# Patient Record
Sex: FEMALE | Race: WHITE | Employment: OTHER | URBAN - METROPOLITAN AREA
[De-identification: names, ages, dates, MRNs, and addresses within clinical notes are randomized per-mention and may not be internally consistent; named-entity substitution may affect disease eponyms.]

---

## 2017-01-13 ENCOUNTER — TELEPHONE (OUTPATIENT)
Dept: FAMILY MEDICINE CLINIC | Age: 82
End: 2017-01-13

## 2017-01-18 ENCOUNTER — TELEPHONE (OUTPATIENT)
Dept: FAMILY MEDICINE CLINIC | Age: 82
End: 2017-01-18

## 2017-01-23 ENCOUNTER — TELEPHONE (OUTPATIENT)
Dept: FAMILY MEDICINE CLINIC | Age: 82
End: 2017-01-23

## 2017-03-02 ENCOUNTER — OFFICE VISIT (OUTPATIENT)
Dept: FAMILY MEDICINE CLINIC | Age: 82
End: 2017-03-02

## 2017-03-02 VITALS
BODY MASS INDEX: 27.05 KG/M2 | TEMPERATURE: 98.1 F | OXYGEN SATURATION: 97 % | WEIGHT: 147 LBS | DIASTOLIC BLOOD PRESSURE: 62 MMHG | RESPIRATION RATE: 16 BRPM | HEART RATE: 72 BPM | SYSTOLIC BLOOD PRESSURE: 118 MMHG | HEIGHT: 62 IN

## 2017-03-02 DIAGNOSIS — F51.01 PRIMARY INSOMNIA: Primary | ICD-10-CM

## 2017-03-02 PROCEDURE — G8510 SCR DEP NEG, NO PLAN REQD: HCPCS | Performed by: FAMILY MEDICINE

## 2017-03-02 PROCEDURE — 3288F FALL RISK ASSESSMENT DOCD: CPT | Performed by: FAMILY MEDICINE

## 2017-03-02 PROCEDURE — 99213 OFFICE O/P EST LOW 20 MIN: CPT | Performed by: FAMILY MEDICINE

## 2017-03-02 RX ORDER — ESZOPICLONE 2 MG/1
2 TABLET, FILM COATED ORAL NIGHTLY PRN
Qty: 30 TABLET | Refills: 1 | Status: SHIPPED | OUTPATIENT
Start: 2017-03-02 | End: 2017-03-07 | Stop reason: CLARIF

## 2017-03-02 ASSESSMENT — PATIENT HEALTH QUESTIONNAIRE - PHQ9
SUM OF ALL RESPONSES TO PHQ QUESTIONS 1-9: 0
1. LITTLE INTEREST OR PLEASURE IN DOING THINGS: 0
2. FEELING DOWN, DEPRESSED OR HOPELESS: 0
SUM OF ALL RESPONSES TO PHQ9 QUESTIONS 1 & 2: 0

## 2017-03-03 ENCOUNTER — TELEPHONE (OUTPATIENT)
Dept: FAMILY MEDICINE CLINIC | Age: 82
End: 2017-03-03

## 2017-03-07 ENCOUNTER — TELEPHONE (OUTPATIENT)
Dept: FAMILY MEDICINE CLINIC | Age: 82
End: 2017-03-07

## 2017-03-07 RX ORDER — RAMELTEON 8 MG/1
8 TABLET ORAL NIGHTLY PRN
Qty: 30 TABLET | Refills: 0 | Status: SHIPPED | OUTPATIENT
Start: 2017-03-07 | End: 2017-04-06

## 2017-03-27 ENCOUNTER — TELEPHONE (OUTPATIENT)
Dept: FAMILY MEDICINE CLINIC | Age: 82
End: 2017-03-27

## 2017-03-31 ENCOUNTER — TELEPHONE (OUTPATIENT)
Dept: FAMILY MEDICINE CLINIC | Age: 82
End: 2017-03-31

## 2017-06-26 ENCOUNTER — OFFICE VISIT (OUTPATIENT)
Dept: FAMILY MEDICINE CLINIC | Age: 82
End: 2017-06-26

## 2017-06-26 VITALS
HEIGHT: 61 IN | HEART RATE: 66 BPM | TEMPERATURE: 97.8 F | WEIGHT: 146 LBS | BODY MASS INDEX: 27.56 KG/M2 | DIASTOLIC BLOOD PRESSURE: 78 MMHG | OXYGEN SATURATION: 97 % | SYSTOLIC BLOOD PRESSURE: 124 MMHG | RESPIRATION RATE: 16 BRPM

## 2017-06-26 DIAGNOSIS — M19.90 ARTHRITIS: ICD-10-CM

## 2017-06-26 DIAGNOSIS — I10 ESSENTIAL HYPERTENSION: Primary | ICD-10-CM

## 2017-06-26 DIAGNOSIS — E78.5 HYPERLIPIDEMIA, UNSPECIFIED HYPERLIPIDEMIA TYPE: ICD-10-CM

## 2017-06-26 LAB
ALBUMIN SERPL-MCNC: 4.7 G/DL (ref 3.4–5)
ALP BLD-CCNC: 48 U/L (ref 40–129)
ALT SERPL-CCNC: 14 U/L (ref 10–40)
ANION GAP SERPL CALCULATED.3IONS-SCNC: 17 MMOL/L (ref 3–16)
AST SERPL-CCNC: 18 U/L (ref 15–37)
BILIRUB SERPL-MCNC: <0.2 MG/DL (ref 0–1)
BILIRUBIN DIRECT: <0.2 MG/DL (ref 0–0.3)
BILIRUBIN, INDIRECT: NORMAL MG/DL (ref 0–1)
BUN BLDV-MCNC: 31 MG/DL (ref 7–20)
CALCIUM SERPL-MCNC: 9.9 MG/DL (ref 8.3–10.6)
CHLORIDE BLD-SCNC: 103 MMOL/L (ref 99–110)
CHOLESTEROL, TOTAL: 162 MG/DL (ref 0–199)
CO2: 24 MMOL/L (ref 21–32)
CREAT SERPL-MCNC: 1.2 MG/DL (ref 0.6–1.2)
GFR AFRICAN AMERICAN: 52
GFR NON-AFRICAN AMERICAN: 43
GLUCOSE BLD-MCNC: 106 MG/DL (ref 70–99)
HDLC SERPL-MCNC: 69 MG/DL (ref 40–60)
LDL CHOLESTEROL CALCULATED: 70 MG/DL
POTASSIUM SERPL-SCNC: 5.3 MMOL/L (ref 3.5–5.1)
SODIUM BLD-SCNC: 144 MMOL/L (ref 136–145)
TOTAL PROTEIN: 7.4 G/DL (ref 6.4–8.2)
TRIGL SERPL-MCNC: 114 MG/DL (ref 0–150)
VLDLC SERPL CALC-MCNC: 23 MG/DL

## 2017-06-26 PROCEDURE — 99214 OFFICE O/P EST MOD 30 MIN: CPT | Performed by: FAMILY MEDICINE

## 2017-06-26 PROCEDURE — 36415 COLL VENOUS BLD VENIPUNCTURE: CPT | Performed by: FAMILY MEDICINE

## 2017-06-26 RX ORDER — ATORVASTATIN CALCIUM 10 MG/1
10 TABLET, FILM COATED ORAL DAILY
Qty: 30 TABLET | Refills: 5 | Status: SHIPPED | OUTPATIENT
Start: 2017-06-26 | End: 2017-11-09 | Stop reason: SDUPTHER

## 2017-06-26 RX ORDER — LISINOPRIL 20 MG/1
20 TABLET ORAL DAILY
Qty: 30 TABLET | Refills: 5 | Status: SHIPPED | OUTPATIENT
Start: 2017-06-26 | End: 2017-11-09 | Stop reason: SDUPTHER

## 2017-06-26 RX ORDER — HYDROCODONE BITARTRATE AND ACETAMINOPHEN 5; 325 MG/1; MG/1
1 TABLET ORAL DAILY PRN
Qty: 30 TABLET | Refills: 0 | Status: CANCELLED | OUTPATIENT
Start: 2017-06-26

## 2017-06-26 RX ORDER — MELOXICAM 15 MG/1
15 TABLET ORAL DAILY
Qty: 30 TABLET | Refills: 5 | Status: SHIPPED | OUTPATIENT
Start: 2017-06-26 | End: 2017-11-09 | Stop reason: SDUPTHER

## 2017-10-09 ENCOUNTER — OFFICE VISIT (OUTPATIENT)
Dept: ORTHOPEDIC SURGERY | Age: 82
End: 2017-10-09

## 2017-10-09 VITALS
BODY MASS INDEX: 27.55 KG/M2 | HEART RATE: 68 BPM | SYSTOLIC BLOOD PRESSURE: 131 MMHG | HEIGHT: 61 IN | WEIGHT: 145.94 LBS | DIASTOLIC BLOOD PRESSURE: 79 MMHG

## 2017-10-09 DIAGNOSIS — M46.1 SACROILIITIS (HCC): ICD-10-CM

## 2017-10-09 DIAGNOSIS — M19.90 ARTHRITIS: Primary | ICD-10-CM

## 2017-10-09 PROCEDURE — 99213 OFFICE O/P EST LOW 20 MIN: CPT | Performed by: ORTHOPAEDIC SURGERY

## 2017-10-09 PROCEDURE — 73502 X-RAY EXAM HIP UNI 2-3 VIEWS: CPT | Performed by: ORTHOPAEDIC SURGERY

## 2017-10-09 RX ORDER — PREDNISONE 1 MG/1
TABLET ORAL
Qty: 55 TABLET | Refills: 1 | Status: SHIPPED | OUTPATIENT
Start: 2017-10-09 | End: 2018-01-05

## 2017-10-09 NOTE — PROGRESS NOTES
meloxicam if she has no relief then she'll discontinue its use and return the meloxicam she gets 50% or more relief she will obtain a 2nd taper and after 1 weeks time proceed with the 2nd taper.   She'll follow-up with us in [id number]

## 2017-10-10 ENCOUNTER — OFFICE VISIT (OUTPATIENT)
Dept: FAMILY MEDICINE CLINIC | Age: 82
End: 2017-10-10

## 2017-10-10 VITALS
HEART RATE: 80 BPM | HEIGHT: 60 IN | DIASTOLIC BLOOD PRESSURE: 58 MMHG | BODY MASS INDEX: 28.47 KG/M2 | WEIGHT: 145 LBS | SYSTOLIC BLOOD PRESSURE: 94 MMHG

## 2017-10-10 DIAGNOSIS — M15.9 PRIMARY OSTEOARTHRITIS INVOLVING MULTIPLE JOINTS: Primary | ICD-10-CM

## 2017-10-10 PROCEDURE — 99213 OFFICE O/P EST LOW 20 MIN: CPT | Performed by: FAMILY MEDICINE

## 2017-10-10 RX ORDER — HYDROCODONE BITARTRATE AND ACETAMINOPHEN 5; 325 MG/1; MG/1
TABLET ORAL
Qty: 30 TABLET | Refills: 0 | Status: SHIPPED | OUTPATIENT
Start: 2017-10-10 | End: 2018-05-01 | Stop reason: SDUPTHER

## 2017-10-10 NOTE — PROGRESS NOTES
SUBJECTIVE:  F/U Osteoarthritis. She continues to take meloxicam, but still has enough arthritis pain that she has difficulty sleeping at night, desires refill of Norco - she takes one at night when needed to help her sleep so she does not hurt at night and feel bad the next day from lack of sleep. She states she needs to take one about once a week. She denies any medication side effects. Current tobacco use: None. OBJECTIVE:  BP (!) 94/58 (Site: Right Arm, Position: Sitting, Cuff Size: Medium Adult)   Pulse 80   Ht 5' (1.524 m)   Wt 145 lb (65.8 kg)   BMI 28.32 kg/m²     Normal mood and affect. ASSESSMENT:  1. Primary osteoarthritis involving multiple joints        PLAN:  Continue meloxicam 15 mg qd. Controlled Substances Monitoring:     Attestation: The Prescription Monitoring Report for this patient was reviewed today. Juana Estrada MD)  Documentation: Possible medication side effects, risk of tolerance and/or dependence, and alternative treatments discussed., No signs of potential drug abuse or diversion identified., Existing medication contract. Juana Estrada MD)     Orders Placed This Encounter   Medications    HYDROcodone-acetaminophen (NORCO) 5-325 MG per tablet     Sig: Take one tablet at bedtime as needed for pain. Marlene Keenan Dispense:  30 tablet     Refill:  0       Arthritis management discussed. 15 minutes were spent with patient . Greater than 50% continuity of care or counseling.

## 2017-11-09 ENCOUNTER — OFFICE VISIT (OUTPATIENT)
Dept: FAMILY MEDICINE CLINIC | Age: 82
End: 2017-11-09

## 2017-11-09 VITALS
HEART RATE: 60 BPM | DIASTOLIC BLOOD PRESSURE: 60 MMHG | BODY MASS INDEX: 28.47 KG/M2 | OXYGEN SATURATION: 96 % | RESPIRATION RATE: 16 BRPM | HEIGHT: 60 IN | WEIGHT: 145 LBS | SYSTOLIC BLOOD PRESSURE: 102 MMHG

## 2017-11-09 DIAGNOSIS — M19.90 ARTHRITIS: ICD-10-CM

## 2017-11-09 DIAGNOSIS — E78.5 HYPERLIPIDEMIA, UNSPECIFIED HYPERLIPIDEMIA TYPE: ICD-10-CM

## 2017-11-09 DIAGNOSIS — I10 ESSENTIAL HYPERTENSION: Primary | ICD-10-CM

## 2017-11-09 PROCEDURE — 99214 OFFICE O/P EST MOD 30 MIN: CPT | Performed by: FAMILY MEDICINE

## 2017-11-09 RX ORDER — LISINOPRIL 20 MG/1
20 TABLET ORAL DAILY
Qty: 30 TABLET | Refills: 5 | Status: SHIPPED | OUTPATIENT
Start: 2017-11-09 | End: 2017-12-19 | Stop reason: SDUPTHER

## 2017-11-09 RX ORDER — ATORVASTATIN CALCIUM 10 MG/1
10 TABLET, FILM COATED ORAL DAILY
Qty: 30 TABLET | Refills: 5 | Status: SHIPPED | OUTPATIENT
Start: 2017-11-09 | End: 2018-07-19 | Stop reason: SDUPTHER

## 2017-11-09 RX ORDER — MELOXICAM 15 MG/1
15 TABLET ORAL DAILY
Qty: 30 TABLET | Refills: 5 | Status: SHIPPED | OUTPATIENT
Start: 2017-11-09 | End: 2018-05-01

## 2017-11-09 NOTE — PROGRESS NOTES
SUBJECTIVE:  F/U HTN, Lipids, OA  She needs medication refills. She is not fasting for blood tests. BP controlled. No chest pain or dyspnea. No medication side effects, such as cough or dizziness. Some myalgias from Lipitor. Meloxicam helps with arthritis pain, with no nausea or abdominal pain, desires refill. Arthritis sx's unchanged. Current tobacco use: None. OBJECTIVE:  /60 (Site: Left Arm, Position: Sitting, Cuff Size: Medium Adult)   Pulse 60   Resp 16   Ht 5' (1.524 m)   Wt 145 lb (65.8 kg)   SpO2 96%   BMI 28.32 kg/m²     Heart: Regular rhythm without murmur. Lungs: Clear to auscultation. Extremities: No edema. ASSESSMENT:  1. Essential hypertension    2. Arthritis    3. Hyperlipidemia, unspecified hyperlipidemia type        PLAN:    Orders Placed This Encounter   Medications    lisinopril (PRINIVIL;ZESTRIL) 20 MG tablet     Sig: Take 1 tablet by mouth daily     Dispense:  30 tablet     Refill:  5    atorvastatin (LIPITOR) 10 MG tablet     Sig: Take 1 tablet by mouth daily     Dispense:  30 tablet     Refill:  5    meloxicam (MOBIC) 15 MG tablet     Sig: Take 1 tablet by mouth daily     Dispense:  30 tablet     Refill:  5       Follow-up appointment in 6 months.

## 2017-12-18 ENCOUNTER — TELEPHONE (OUTPATIENT)
Dept: FAMILY MEDICINE CLINIC | Age: 82
End: 2017-12-18

## 2017-12-18 NOTE — TELEPHONE ENCOUNTER
Former patient of Dr Shabana Ford; patient scheduled for new patient appointment 1-05-18 but is out of Lisinopril and would like to know if she can get a refill     [facility]

## 2017-12-19 RX ORDER — LISINOPRIL 20 MG/1
20 TABLET ORAL DAILY
Qty: 30 TABLET | Refills: 0 | Status: SHIPPED | OUTPATIENT
Start: 2017-12-19 | End: 2018-08-16 | Stop reason: SDUPTHER

## 2018-01-05 ENCOUNTER — OFFICE VISIT (OUTPATIENT)
Dept: FAMILY MEDICINE CLINIC | Age: 83
End: 2018-01-05

## 2018-01-05 VITALS
HEART RATE: 88 BPM | SYSTOLIC BLOOD PRESSURE: 124 MMHG | HEIGHT: 60 IN | OXYGEN SATURATION: 97 % | DIASTOLIC BLOOD PRESSURE: 76 MMHG | BODY MASS INDEX: 29.06 KG/M2 | WEIGHT: 148 LBS | RESPIRATION RATE: 16 BRPM

## 2018-01-05 DIAGNOSIS — E78.5 HYPERLIPIDEMIA, UNSPECIFIED HYPERLIPIDEMIA TYPE: ICD-10-CM

## 2018-01-05 DIAGNOSIS — Z23 NEED FOR PNEUMOCOCCAL VACCINATION: ICD-10-CM

## 2018-01-05 DIAGNOSIS — M19.90 OSTEOARTHRITIS, UNSPECIFIED OSTEOARTHRITIS TYPE, UNSPECIFIED SITE: ICD-10-CM

## 2018-01-05 DIAGNOSIS — M85.80 OSTEOPENIA, UNSPECIFIED LOCATION: ICD-10-CM

## 2018-01-05 DIAGNOSIS — I10 ESSENTIAL HYPERTENSION: ICD-10-CM

## 2018-01-05 DIAGNOSIS — M46.1 SACROILIITIS (HCC): ICD-10-CM

## 2018-01-05 LAB
ANION GAP SERPL CALCULATED.3IONS-SCNC: 14 MMOL/L (ref 3–16)
BASOPHILS ABSOLUTE: 0 K/UL (ref 0–0.2)
BASOPHILS RELATIVE PERCENT: 0.4 %
BUN BLDV-MCNC: 31 MG/DL (ref 7–20)
CALCIUM SERPL-MCNC: 9.8 MG/DL (ref 8.3–10.6)
CHLORIDE BLD-SCNC: 103 MMOL/L (ref 99–110)
CO2: 27 MMOL/L (ref 21–32)
CREAT SERPL-MCNC: 1.3 MG/DL (ref 0.6–1.2)
EOSINOPHILS ABSOLUTE: 0.1 K/UL (ref 0–0.6)
EOSINOPHILS RELATIVE PERCENT: 2.2 %
GFR AFRICAN AMERICAN: 48
GFR NON-AFRICAN AMERICAN: 39
GLUCOSE BLD-MCNC: 138 MG/DL (ref 70–99)
HCT VFR BLD CALC: 36.5 % (ref 36–48)
HEMOGLOBIN: 12 G/DL (ref 12–16)
LYMPHOCYTES ABSOLUTE: 1.6 K/UL (ref 1–5.1)
LYMPHOCYTES RELATIVE PERCENT: 30.4 %
MCH RBC QN AUTO: 30.5 PG (ref 26–34)
MCHC RBC AUTO-ENTMCNC: 32.9 G/DL (ref 31–36)
MCV RBC AUTO: 92.7 FL (ref 80–100)
MONOCYTES ABSOLUTE: 0.4 K/UL (ref 0–1.3)
MONOCYTES RELATIVE PERCENT: 6.7 %
NEUTROPHILS ABSOLUTE: 3.2 K/UL (ref 1.7–7.7)
NEUTROPHILS RELATIVE PERCENT: 60.3 %
PDW BLD-RTO: 14.9 % (ref 12.4–15.4)
PLATELET # BLD: 171 K/UL (ref 135–450)
PMV BLD AUTO: 9.6 FL (ref 5–10.5)
POTASSIUM SERPL-SCNC: 4.8 MMOL/L (ref 3.5–5.1)
RBC # BLD: 3.94 M/UL (ref 4–5.2)
SODIUM BLD-SCNC: 144 MMOL/L (ref 136–145)
TSH SERPL DL<=0.05 MIU/L-ACNC: 2.58 UIU/ML (ref 0.27–4.2)
WBC # BLD: 5.3 K/UL (ref 4–11)

## 2018-01-05 PROCEDURE — 99214 OFFICE O/P EST MOD 30 MIN: CPT | Performed by: NURSE PRACTITIONER

## 2018-01-05 PROCEDURE — 90732 PPSV23 VACC 2 YRS+ SUBQ/IM: CPT | Performed by: NURSE PRACTITIONER

## 2018-01-05 PROCEDURE — G0009 ADMIN PNEUMOCOCCAL VACCINE: HCPCS | Performed by: NURSE PRACTITIONER

## 2018-01-05 ASSESSMENT — PATIENT HEALTH QUESTIONNAIRE - PHQ9
SUM OF ALL RESPONSES TO PHQ QUESTIONS 1-9: 2
1. LITTLE INTEREST OR PLEASURE IN DOING THINGS: 1
2. FEELING DOWN, DEPRESSED OR HOPELESS: 1
SUM OF ALL RESPONSES TO PHQ9 QUESTIONS 1 & 2: 2

## 2018-01-05 ASSESSMENT — ENCOUNTER SYMPTOMS
BACK PAIN: 1
BLOOD IN STOOL: 0
BLURRED VISION: 0
ABDOMINAL PAIN: 0
COUGH: 0
ORTHOPNEA: 0
SHORTNESS OF BREATH: 0

## 2018-01-05 NOTE — PROGRESS NOTES
Janett Marking [de-identified] y.o. female    Chief Complaint   Patient presents with    Hypertension    Hyperlipidemia    New Patient       HPI     Used to see . +HTN, takes Lisinopril daily, tolerates well, no side effects,   +HLD, on Lipitor 10mg, tolerates well, last  about 6 months ago   Also takes asa 81mg, Sharmila oil, cinnamon. On Ca- Vit D for osteopenia. 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 Takes meloxicam, at times prednisone, very seldom- Norco. Sees ortho. Past medical, surgical, family and social history were reviewed and updated with the patient. Current Outpatient Prescriptions:     lisinopril (PRINIVIL;ZESTRIL) 20 MG tablet, Take 1 tablet by mouth daily, Disp: 30 tablet, Rfl: 0    atorvastatin (LIPITOR) 10 MG tablet, Take 1 tablet by mouth daily, Disp: 30 tablet, Rfl: 5    meloxicam (MOBIC) 15 MG tablet, Take 1 tablet by mouth daily, Disp: 30 tablet, Rfl: 5    HYDROcodone-acetaminophen (NORCO) 5-325 MG per tablet, Take one tablet at bedtime as needed for pain. ., Disp: 30 tablet, Rfl: 0    Calcium-Magnesium-Vitamin D (CALCIUM 500 PO), Take  by mouth., Disp: , Rfl:     Cinnamon 500 MG TABS, Take  by mouth., Disp: , Rfl:     FISH OIL, 1 capsule by Does not apply route., Disp: , Rfl:     aspirin 81 MG tablet, Take 81 mg by mouth daily. , Disp: , Rfl:     Review of Systems   Constitutional: Negative for malaise/fatigue. Eyes: Negative for blurred vision. Respiratory: Negative for cough and shortness of breath. Cardiovascular: Negative for chest pain, palpitations, orthopnea, claudication and leg swelling. Gastrointestinal: Negative for abdominal pain and blood in stool. Musculoskeletal: Positive for back pain. Neurological: Negative for dizziness, tingling, focal weakness, weakness and headaches. Psychiatric/Behavioral: Negative for depression.  The patient is not nervous/anxious. Physical Exam   Constitutional: She is oriented to person, place, and time. She appears well-developed and well-nourished. No distress. Eyes: Conjunctivae are normal.   Neck: Neck supple. No JVD present. Carotid bruit is not present. No thyromegaly present. Cardiovascular: Normal rate, regular rhythm and intact distal pulses. Murmur (KAREEN 1/6) heard. No edema   Pulmonary/Chest: Effort normal and breath sounds normal.   Abdominal: Soft. Bowel sounds are normal.   Musculoskeletal: She exhibits no tenderness. Lymphadenopathy:     She has no cervical adenopathy. Neurological: She is alert and oriented to person, place, and time. Skin: No erythema. Psychiatric: She has a normal mood and affect. Her behavior is normal.   Nursing note and vitals reviewed. Vitals:    01/05/18 0958   BP: 124/76   Pulse: 88   Resp: 16   SpO2: 97%     Assessment    1. Hyperlipidemia, unspecified hyperlipidemia type    2. Essential hypertension    3. Osteoarthritis, unspecified osteoarthritis type, unspecified site    4. Sacroiliitis ([address]    5. Osteopenia, unspecified location    6. Need for pneumococcal vaccination        Plan    Brad Medina was seen today for hypertension, hyperlipidemia and new patient.     Diagnoses and all orders for this visit:    Hyperlipidemia, unspecified hyperlipidemia type        -     Controlled, continue current     Essential hypertension  -     Basic Metabolic Panel  -     CBC WITH AUTO DIFFERENTIAL  -     TSH without Reflex    Osteoarthritis, unspecified osteoarthritis type, unspecified site  Sacroiliitis (HCC)        -     Continue current medications, unless kidney labs continue to worsen from NSAIDs, will recheck BMP    Osteopenia, unspecified location        -     Continue Calcium and Vitamin D, weight bearing exercises    Need for pneumococcal vaccination  -     Pneumococcal polysaccharide vaccine 23-valent >= 1yo subcutaneous/IM (PNEUMOVAX

## 2018-01-08 DIAGNOSIS — R73.09 ELEVATED GLUCOSE: Primary | ICD-10-CM

## 2018-01-08 DIAGNOSIS — R73.09 ELEVATED GLUCOSE: ICD-10-CM

## 2018-01-09 DIAGNOSIS — N28.9 RENAL INSUFFICIENCY: Primary | ICD-10-CM

## 2018-01-09 LAB
ESTIMATED AVERAGE GLUCOSE: 125.5 MG/DL
HBA1C MFR BLD: 6 %

## 2018-03-05 DIAGNOSIS — N28.9 RENAL INSUFFICIENCY: ICD-10-CM

## 2018-03-05 LAB
ANION GAP SERPL CALCULATED.3IONS-SCNC: 16 MMOL/L (ref 3–16)
BUN BLDV-MCNC: 33 MG/DL (ref 7–20)
CALCIUM SERPL-MCNC: 9.8 MG/DL (ref 8.3–10.6)
CHLORIDE BLD-SCNC: 97 MMOL/L (ref 99–110)
CO2: 26 MMOL/L (ref 21–32)
CREAT SERPL-MCNC: 1.3 MG/DL (ref 0.6–1.2)
GFR AFRICAN AMERICAN: 48
GFR NON-AFRICAN AMERICAN: 39
GLUCOSE BLD-MCNC: 97 MG/DL (ref 70–99)
POTASSIUM SERPL-SCNC: 5 MMOL/L (ref 3.5–5.1)
SODIUM BLD-SCNC: 139 MMOL/L (ref 136–145)

## 2018-05-01 ENCOUNTER — OFFICE VISIT (OUTPATIENT)
Dept: FAMILY MEDICINE CLINIC | Age: 83
End: 2018-05-01

## 2018-05-01 VITALS
WEIGHT: 142 LBS | DIASTOLIC BLOOD PRESSURE: 84 MMHG | BODY MASS INDEX: 27.88 KG/M2 | SYSTOLIC BLOOD PRESSURE: 148 MMHG | HEIGHT: 60 IN | HEART RATE: 84 BPM | OXYGEN SATURATION: 98 %

## 2018-05-01 DIAGNOSIS — N28.9 RENAL INSUFFICIENCY: ICD-10-CM

## 2018-05-01 DIAGNOSIS — Z12.39 SCREENING FOR BREAST CANCER: ICD-10-CM

## 2018-05-01 DIAGNOSIS — H61.21 RIGHT EAR IMPACTED CERUMEN: ICD-10-CM

## 2018-05-01 DIAGNOSIS — Z00.00 ROUTINE GENERAL MEDICAL EXAMINATION AT A HEALTH CARE FACILITY: Primary | ICD-10-CM

## 2018-05-01 DIAGNOSIS — H91.93 BILATERAL HEARING LOSS, UNSPECIFIED HEARING LOSS TYPE: ICD-10-CM

## 2018-05-01 DIAGNOSIS — I10 ESSENTIAL HYPERTENSION: ICD-10-CM

## 2018-05-01 DIAGNOSIS — M19.90 ARTHRITIS: ICD-10-CM

## 2018-05-01 LAB
ANION GAP SERPL CALCULATED.3IONS-SCNC: 13 MMOL/L (ref 3–16)
BUN BLDV-MCNC: 32 MG/DL (ref 7–20)
CALCIUM SERPL-MCNC: 9.7 MG/DL (ref 8.3–10.6)
CHLORIDE BLD-SCNC: 101 MMOL/L (ref 99–110)
CO2: 27 MMOL/L (ref 21–32)
CREAT SERPL-MCNC: 1 MG/DL (ref 0.6–1.2)
GFR AFRICAN AMERICAN: >60
GFR NON-AFRICAN AMERICAN: 53
GLUCOSE BLD-MCNC: 100 MG/DL (ref 70–99)
POTASSIUM SERPL-SCNC: 5 MMOL/L (ref 3.5–5.1)
SODIUM BLD-SCNC: 141 MMOL/L (ref 136–145)

## 2018-05-01 PROCEDURE — G0439 PPPS, SUBSEQ VISIT: HCPCS | Performed by: NURSE PRACTITIONER

## 2018-05-01 RX ORDER — HYDROCODONE BITARTRATE AND ACETAMINOPHEN 5; 325 MG/1; MG/1
1 TABLET ORAL EVERY 6 HOURS PRN
Qty: 28 TABLET | Refills: 0 | Status: SHIPPED | OUTPATIENT
Start: 2018-05-01 | End: 2018-05-01 | Stop reason: SDUPTHER

## 2018-05-01 RX ORDER — HYDROCODONE BITARTRATE AND ACETAMINOPHEN 5; 325 MG/1; MG/1
TABLET ORAL
Qty: 30 TABLET | Refills: 0 | Status: CANCELLED | OUTPATIENT
Start: 2018-05-01 | End: 2018-05-31

## 2018-05-01 RX ORDER — MULTIVIT-MIN/IRON/FOLIC ACID/K 18-600-40
CAPSULE ORAL
COMMUNITY

## 2018-05-01 ASSESSMENT — ANXIETY QUESTIONNAIRES: GAD7 TOTAL SCORE: 1

## 2018-05-01 ASSESSMENT — PATIENT HEALTH QUESTIONNAIRE - PHQ9: SUM OF ALL RESPONSES TO PHQ QUESTIONS 1-9: 0

## 2018-05-01 ASSESSMENT — LIFESTYLE VARIABLES
HOW OFTEN DO YOU HAVE A DRINK CONTAINING ALCOHOL: 0
HOW OFTEN DO YOU HAVE A DRINK CONTAINING ALCOHOL: 0

## 2018-05-02 RX ORDER — HYDROCODONE BITARTRATE AND ACETAMINOPHEN 5; 325 MG/1; MG/1
1 TABLET ORAL EVERY 6 HOURS PRN
Qty: 28 TABLET | Refills: 0 | Status: SHIPPED | OUTPATIENT
Start: 2018-05-02 | End: 2018-05-09

## 2018-07-17 ENCOUNTER — OFFICE VISIT (OUTPATIENT)
Dept: FAMILY MEDICINE CLINIC | Age: 83
End: 2018-07-17

## 2018-07-17 VITALS
SYSTOLIC BLOOD PRESSURE: 126 MMHG | WEIGHT: 141 LBS | HEART RATE: 84 BPM | TEMPERATURE: 98.4 F | RESPIRATION RATE: 16 BRPM | BODY MASS INDEX: 27.54 KG/M2 | OXYGEN SATURATION: 96 % | DIASTOLIC BLOOD PRESSURE: 78 MMHG

## 2018-07-17 DIAGNOSIS — G47.00 INSOMNIA, UNSPECIFIED TYPE: ICD-10-CM

## 2018-07-17 DIAGNOSIS — D64.9 ANEMIA, UNSPECIFIED TYPE: Primary | ICD-10-CM

## 2018-07-17 DIAGNOSIS — D64.9 ANEMIA, UNSPECIFIED TYPE: ICD-10-CM

## 2018-07-17 DIAGNOSIS — R68.83 CHILLS WITHOUT FEVER: ICD-10-CM

## 2018-07-17 DIAGNOSIS — R45.89 DYSPHORIC MOOD: ICD-10-CM

## 2018-07-17 DIAGNOSIS — R68.89 COLD INTOLERANCE: ICD-10-CM

## 2018-07-17 LAB
A/G RATIO: 1.6 (ref 1.1–2.2)
ALBUMIN SERPL-MCNC: 4.5 G/DL (ref 3.4–5)
ALP BLD-CCNC: 49 U/L (ref 40–129)
ALT SERPL-CCNC: 12 U/L (ref 10–40)
ANION GAP SERPL CALCULATED.3IONS-SCNC: 13 MMOL/L (ref 3–16)
AST SERPL-CCNC: 17 U/L (ref 15–37)
BASOPHILS ABSOLUTE: 0 K/UL (ref 0–0.2)
BASOPHILS RELATIVE PERCENT: 0.5 %
BILIRUB SERPL-MCNC: 0.3 MG/DL (ref 0–1)
BILIRUBIN, POC: NORMAL
BLOOD URINE, POC: NORMAL
BUN BLDV-MCNC: 26 MG/DL (ref 7–20)
CALCIUM SERPL-MCNC: 9.9 MG/DL (ref 8.3–10.6)
CHLORIDE BLD-SCNC: 101 MMOL/L (ref 99–110)
CLARITY, POC: CLEAR
CO2: 26 MMOL/L (ref 21–32)
COLOR, POC: YELLOW
CREAT SERPL-MCNC: 1.2 MG/DL (ref 0.6–1.2)
EOSINOPHILS ABSOLUTE: 0.1 K/UL (ref 0–0.6)
EOSINOPHILS RELATIVE PERCENT: 1.7 %
FERRITIN: 145.7 NG/ML (ref 15–150)
FOLATE: 16 NG/ML (ref 4.78–24.2)
GFR AFRICAN AMERICAN: 52
GFR NON-AFRICAN AMERICAN: 43
GLOBULIN: 2.8 G/DL
GLUCOSE BLD-MCNC: 96 MG/DL (ref 70–99)
GLUCOSE URINE, POC: NORMAL
HCT VFR BLD CALC: 33.7 % (ref 36–48)
HEMOGLOBIN: 11 G/DL (ref 12–16)
IRON SATURATION: 22 % (ref 15–50)
IRON: 65 UG/DL (ref 37–145)
KETONES, POC: NORMAL
LEUKOCYTE EST, POC: NORMAL
LYMPHOCYTES ABSOLUTE: 1.7 K/UL (ref 1–5.1)
LYMPHOCYTES RELATIVE PERCENT: 31.4 %
MCH RBC QN AUTO: 30.3 PG (ref 26–34)
MCHC RBC AUTO-ENTMCNC: 32.6 G/DL (ref 31–36)
MCV RBC AUTO: 93 FL (ref 80–100)
MONOCYTES ABSOLUTE: 0.3 K/UL (ref 0–1.3)
MONOCYTES RELATIVE PERCENT: 6.4 %
NEUTROPHILS ABSOLUTE: 3.2 K/UL (ref 1.7–7.7)
NEUTROPHILS RELATIVE PERCENT: 60 %
NITRITE, POC: NORMAL
PDW BLD-RTO: 14.1 % (ref 12.4–15.4)
PH, POC: 7
PLATELET # BLD: 200 K/UL (ref 135–450)
PMV BLD AUTO: 9.3 FL (ref 5–10.5)
POTASSIUM SERPL-SCNC: 5.1 MMOL/L (ref 3.5–5.1)
PROTEIN, POC: NORMAL
RBC # BLD: 3.62 M/UL (ref 4–5.2)
SODIUM BLD-SCNC: 140 MMOL/L (ref 136–145)
SPECIFIC GRAVITY, POC: 1.01
T4 FREE: 1.2 NG/DL (ref 0.9–1.8)
TOTAL IRON BINDING CAPACITY: 297 UG/DL (ref 260–445)
TOTAL PROTEIN: 7.3 G/DL (ref 6.4–8.2)
TSH SERPL DL<=0.05 MIU/L-ACNC: 2.48 UIU/ML (ref 0.27–4.2)
UROBILINOGEN, POC: 0.2
VITAMIN B-12: 369 PG/ML (ref 211–911)
WBC # BLD: 5.4 K/UL (ref 4–11)

## 2018-07-17 PROCEDURE — 81002 URINALYSIS NONAUTO W/O SCOPE: CPT | Performed by: NURSE PRACTITIONER

## 2018-07-17 PROCEDURE — 99214 OFFICE O/P EST MOD 30 MIN: CPT | Performed by: NURSE PRACTITIONER

## 2018-07-17 RX ORDER — TRAZODONE HYDROCHLORIDE 50 MG/1
25 TABLET ORAL NIGHTLY
Qty: 30 TABLET | Refills: 1 | Status: SHIPPED | OUTPATIENT
Start: 2018-07-17 | End: 2018-10-11 | Stop reason: SDUPTHER

## 2018-07-17 ASSESSMENT — PATIENT HEALTH QUESTIONNAIRE - PHQ9
2. FEELING DOWN, DEPRESSED OR HOPELESS: 1
1. LITTLE INTEREST OR PLEASURE IN DOING THINGS: 1
SUM OF ALL RESPONSES TO PHQ QUESTIONS 1-9: 2
SUM OF ALL RESPONSES TO PHQ9 QUESTIONS 1 & 2: 2

## 2018-07-17 ASSESSMENT — ENCOUNTER SYMPTOMS
ABDOMINAL DISTENTION: 0
CHEST TIGHTNESS: 0
APNEA: 0
SHORTNESS OF BREATH: 0
ABDOMINAL PAIN: 0

## 2018-07-17 NOTE — PROGRESS NOTES
Subjective:      Patient ID: Jason Ignacio is a 82 y.o. female. HPI     Pt comes in comes in complaining of chills, feeling cold and throughout the day for 1 month. Symptoms intermittent. No fever, no URI, no cough, no sweats, no blood in stool or dark stools, no UTI symptoms. No weight loss. +dyshoric mood, takes care of  with dementia. Feels tired as cannot get good night sleep. Reports trouble sleeping, unable to stay asleep long most days of the week. PHQ Scores 7/17/2018 5/1/2018 1/5/2018 3/2/2017 1/8/2016 12/19/2014 11/26/2013   PHQ2 Score 2 0 2 0 0 1 1   PHQ9 Score 2 0 2 0 0 1 1     Interpretation of Total Score Depression Severity: 1-4 = Minimal depression, 5-9 = Mild depression, 10-14 = Moderate depression, 15-19 = Moderately severe depression, 20-27 = Severe depression      Review of Systems   Constitutional: Positive for chills. Negative for activity change, appetite change, diaphoresis, fatigue, fever and unexpected weight change. HENT: Negative for congestion and dental problem. Respiratory: Negative for apnea, chest tightness and shortness of breath. Cardiovascular: Negative. Gastrointestinal: Negative for abdominal distention and abdominal pain. Endocrine: Positive for cold intolerance. Genitourinary: Negative for difficulty urinating and dysuria. Neurological: Negative. Psychiatric/Behavioral: Positive for dysphoric mood and sleep disturbance. Objective:   Physical Exam   Constitutional: She is oriented to person, place, and time. She appears well-developed and well-nourished. No distress. HENT:   Head: Normocephalic and atraumatic. Eyes: No scleral icterus. Neck: Neck supple. No thyromegaly present. Cardiovascular: Normal rate, regular rhythm, normal heart sounds and intact distal pulses. Exam reveals no gallop and no friction rub. No murmur heard. Pulmonary/Chest: Effort normal and breath sounds normal. No respiratory distress.  She has no

## 2018-07-19 DIAGNOSIS — D64.9 ANEMIA, UNSPECIFIED TYPE: ICD-10-CM

## 2018-07-19 RX ORDER — ATORVASTATIN CALCIUM 10 MG/1
10 TABLET, FILM COATED ORAL DAILY
Qty: 30 TABLET | Refills: 5 | Status: SHIPPED | OUTPATIENT
Start: 2018-07-19 | End: 2019-01-10 | Stop reason: SDUPTHER

## 2018-07-20 LAB
ORGANISM: ABNORMAL
URINE CULTURE, ROUTINE: ABNORMAL
URINE CULTURE, ROUTINE: ABNORMAL

## 2018-10-11 ENCOUNTER — OFFICE VISIT (OUTPATIENT)
Dept: FAMILY MEDICINE CLINIC | Age: 83
End: 2018-10-11
Payer: MEDICARE

## 2018-10-11 ENCOUNTER — TELEPHONE (OUTPATIENT)
Dept: FAMILY MEDICINE CLINIC | Age: 83
End: 2018-10-11

## 2018-10-11 VITALS
HEART RATE: 70 BPM | BODY MASS INDEX: 27.29 KG/M2 | WEIGHT: 139 LBS | SYSTOLIC BLOOD PRESSURE: 134 MMHG | OXYGEN SATURATION: 99 % | DIASTOLIC BLOOD PRESSURE: 80 MMHG | RESPIRATION RATE: 16 BRPM | HEIGHT: 60 IN

## 2018-10-11 DIAGNOSIS — Z23 NEED FOR INFLUENZA VACCINATION: ICD-10-CM

## 2018-10-11 DIAGNOSIS — E78.5 HYPERLIPIDEMIA, UNSPECIFIED HYPERLIPIDEMIA TYPE: ICD-10-CM

## 2018-10-11 DIAGNOSIS — R45.89 DYSPHORIC MOOD: ICD-10-CM

## 2018-10-11 DIAGNOSIS — I10 ESSENTIAL HYPERTENSION: ICD-10-CM

## 2018-10-11 DIAGNOSIS — M19.90 ARTHRITIS: ICD-10-CM

## 2018-10-11 DIAGNOSIS — D64.9 ANEMIA, UNSPECIFIED TYPE: ICD-10-CM

## 2018-10-11 DIAGNOSIS — N28.9 RENAL INSUFFICIENCY: ICD-10-CM

## 2018-10-11 DIAGNOSIS — G47.00 INSOMNIA, UNSPECIFIED TYPE: ICD-10-CM

## 2018-10-11 LAB
ALBUMIN SERPL-MCNC: 4.7 G/DL (ref 3.4–5)
ANION GAP SERPL CALCULATED.3IONS-SCNC: 18 MMOL/L (ref 3–16)
BASOPHILS ABSOLUTE: 0 K/UL (ref 0–0.2)
BASOPHILS RELATIVE PERCENT: 0.5 %
BUN BLDV-MCNC: 33 MG/DL (ref 7–20)
CALCIUM SERPL-MCNC: 10.3 MG/DL (ref 8.3–10.6)
CHLORIDE BLD-SCNC: 103 MMOL/L (ref 99–110)
CHOLESTEROL, TOTAL: 161 MG/DL (ref 0–199)
CO2: 23 MMOL/L (ref 21–32)
CREAT SERPL-MCNC: 1.2 MG/DL (ref 0.6–1.2)
EOSINOPHILS ABSOLUTE: 0.1 K/UL (ref 0–0.6)
EOSINOPHILS RELATIVE PERCENT: 1.5 %
GFR AFRICAN AMERICAN: 52
GFR NON-AFRICAN AMERICAN: 43
GLUCOSE BLD-MCNC: 101 MG/DL (ref 70–99)
HCT VFR BLD CALC: 37.1 % (ref 36–48)
HDLC SERPL-MCNC: 71 MG/DL (ref 40–60)
HEMOGLOBIN: 12 G/DL (ref 12–16)
LDL CHOLESTEROL CALCULATED: 63 MG/DL
LYMPHOCYTES ABSOLUTE: 1.5 K/UL (ref 1–5.1)
LYMPHOCYTES RELATIVE PERCENT: 29.1 %
MCH RBC QN AUTO: 29.6 PG (ref 26–34)
MCHC RBC AUTO-ENTMCNC: 32.2 G/DL (ref 31–36)
MCV RBC AUTO: 91.8 FL (ref 80–100)
MONOCYTES ABSOLUTE: 0.3 K/UL (ref 0–1.3)
MONOCYTES RELATIVE PERCENT: 6.7 %
NEUTROPHILS ABSOLUTE: 3.2 K/UL (ref 1.7–7.7)
NEUTROPHILS RELATIVE PERCENT: 62.2 %
PDW BLD-RTO: 15 % (ref 12.4–15.4)
PHOSPHORUS: 3.7 MG/DL (ref 2.5–4.9)
PLATELET # BLD: 198 K/UL (ref 135–450)
PMV BLD AUTO: 9.5 FL (ref 5–10.5)
POTASSIUM SERPL-SCNC: 4.7 MMOL/L (ref 3.5–5.1)
RBC # BLD: 4.04 M/UL (ref 4–5.2)
SODIUM BLD-SCNC: 144 MMOL/L (ref 136–145)
TRIGL SERPL-MCNC: 133 MG/DL (ref 0–150)
VLDLC SERPL CALC-MCNC: 27 MG/DL
WBC # BLD: 5.2 K/UL (ref 4–11)

## 2018-10-11 PROCEDURE — 99214 OFFICE O/P EST MOD 30 MIN: CPT | Performed by: NURSE PRACTITIONER

## 2018-10-11 PROCEDURE — G0008 ADMIN INFLUENZA VIRUS VAC: HCPCS | Performed by: NURSE PRACTITIONER

## 2018-10-11 PROCEDURE — 90662 IIV NO PRSV INCREASED AG IM: CPT | Performed by: NURSE PRACTITIONER

## 2018-10-11 RX ORDER — LISINOPRIL 20 MG/1
TABLET ORAL
Qty: 30 TABLET | Refills: 5 | Status: SHIPPED | OUTPATIENT
Start: 2018-10-11 | End: 2019-01-10 | Stop reason: SDUPTHER

## 2018-10-11 RX ORDER — HYDROCODONE BITARTRATE AND ACETAMINOPHEN 5; 325 MG/1; MG/1
1 TABLET ORAL EVERY 6 HOURS PRN
Qty: 28 TABLET | Refills: 0 | Status: SHIPPED | OUTPATIENT
Start: 2018-10-11 | End: 2018-10-11 | Stop reason: SDUPTHER

## 2018-10-11 RX ORDER — TRAZODONE HYDROCHLORIDE 50 MG/1
50 TABLET ORAL NIGHTLY
Qty: 30 TABLET | Refills: 1 | Status: SHIPPED | OUTPATIENT
Start: 2018-10-11 | End: 2019-01-10 | Stop reason: SDUPTHER

## 2018-10-11 RX ORDER — HYDROCODONE BITARTRATE AND ACETAMINOPHEN 5; 325 MG/1; MG/1
1 TABLET ORAL EVERY 6 HOURS PRN
Qty: 28 TABLET | Refills: 0 | Status: SHIPPED | OUTPATIENT
Start: 2018-10-11 | End: 2018-10-18

## 2018-10-11 RX ORDER — HYDROCODONE BITARTRATE AND ACETAMINOPHEN 5; 325 MG/1; MG/1
1 TABLET ORAL EVERY 6 HOURS PRN
COMMUNITY
End: 2018-10-11

## 2018-10-16 ASSESSMENT — ENCOUNTER SYMPTOMS
CHEST TIGHTNESS: 0
SHORTNESS OF BREATH: 0
DIARRHEA: 0
BLOOD IN STOOL: 0
ABDOMINAL PAIN: 0
COUGH: 0

## 2018-10-16 NOTE — PROGRESS NOTES
Vaccine Information Sheet, \"Influenza - Inactivated\"  given to Porter Medina, or parent/legal guardian of  Porter Medina and verbalized understanding. Patient responses:    Have you ever had a reaction to a flu vaccine? No  Are you able to eat eggs without adverse effects? No  Do you have any current illness? No  Have you ever had [place] Syndrome? No    Flu vaccine given per order. Please see immunization tab.
(FLUZONE HD)

## 2018-10-26 ENCOUNTER — TELEPHONE (OUTPATIENT)
Dept: FAMILY MEDICINE CLINIC | Age: 83
End: 2018-10-26

## 2019-01-09 DIAGNOSIS — M19.90 ARTHRITIS: ICD-10-CM

## 2019-01-10 ENCOUNTER — OFFICE VISIT (OUTPATIENT)
Dept: FAMILY MEDICINE CLINIC | Age: 84
End: 2019-01-10
Payer: MEDICARE

## 2019-01-10 VITALS
RESPIRATION RATE: 16 BRPM | SYSTOLIC BLOOD PRESSURE: 130 MMHG | OXYGEN SATURATION: 98 % | HEART RATE: 74 BPM | DIASTOLIC BLOOD PRESSURE: 78 MMHG | WEIGHT: 139 LBS | BODY MASS INDEX: 27.15 KG/M2

## 2019-01-10 DIAGNOSIS — H61.21 IMPACTED CERUMEN OF RIGHT EAR: ICD-10-CM

## 2019-01-10 DIAGNOSIS — I10 ESSENTIAL HYPERTENSION: ICD-10-CM

## 2019-01-10 DIAGNOSIS — N18.30 CKD (CHRONIC KIDNEY DISEASE) STAGE 3, GFR 30-59 ML/MIN (HCC): ICD-10-CM

## 2019-01-10 DIAGNOSIS — G47.00 INSOMNIA, UNSPECIFIED TYPE: ICD-10-CM

## 2019-01-10 DIAGNOSIS — M15.9 PRIMARY OSTEOARTHRITIS INVOLVING MULTIPLE JOINTS: ICD-10-CM

## 2019-01-10 DIAGNOSIS — R45.89 DYSPHORIC MOOD: ICD-10-CM

## 2019-01-10 PROCEDURE — 99214 OFFICE O/P EST MOD 30 MIN: CPT | Performed by: NURSE PRACTITIONER

## 2019-01-10 RX ORDER — HYDROCODONE BITARTRATE AND ACETAMINOPHEN 5; 325 MG/1; MG/1
1 TABLET ORAL EVERY 6 HOURS PRN
Qty: 28 TABLET | Refills: 0 | Status: SHIPPED | OUTPATIENT
Start: 2019-01-10 | End: 2019-10-15 | Stop reason: SDUPTHER

## 2019-01-10 RX ORDER — HYDROCODONE BITARTRATE AND ACETAMINOPHEN 5; 325 MG/1; MG/1
1 TABLET ORAL EVERY 6 HOURS PRN
COMMUNITY
End: 2019-01-10 | Stop reason: SDUPTHER

## 2019-01-10 RX ORDER — HYDROCODONE BITARTRATE AND ACETAMINOPHEN 5; 325 MG/1; MG/1
1 TABLET ORAL EVERY 6 HOURS PRN
Qty: 28 TABLET | Refills: 0 | OUTPATIENT
Start: 2019-01-10 | End: 2019-01-17

## 2019-01-10 RX ORDER — ATORVASTATIN CALCIUM 10 MG/1
10 TABLET, FILM COATED ORAL DAILY
Qty: 90 TABLET | Refills: 1 | Status: SHIPPED | OUTPATIENT
Start: 2019-01-10 | End: 2019-07-17 | Stop reason: SDUPTHER

## 2019-01-10 RX ORDER — LISINOPRIL 20 MG/1
TABLET ORAL
Qty: 90 TABLET | Refills: 1 | Status: SHIPPED | OUTPATIENT
Start: 2019-01-10 | End: 2019-10-31

## 2019-01-10 RX ORDER — TRAZODONE HYDROCHLORIDE 50 MG/1
50 TABLET ORAL NIGHTLY
Qty: 90 TABLET | Refills: 1 | Status: SHIPPED | OUTPATIENT
Start: 2019-01-10 | End: 2020-11-23 | Stop reason: ALTCHOICE

## 2019-01-10 ASSESSMENT — ENCOUNTER SYMPTOMS
WHEEZING: 0
ABDOMINAL PAIN: 0
COUGH: 0
COLOR CHANGE: 0
SHORTNESS OF BREATH: 0
BACK PAIN: 1
BLOOD IN STOOL: 0
CHEST TIGHTNESS: 0

## 2019-04-12 ENCOUNTER — OFFICE VISIT (OUTPATIENT)
Dept: ORTHOPEDIC SURGERY | Age: 84
End: 2019-04-12
Payer: MEDICARE

## 2019-04-12 VITALS — HEIGHT: 60 IN | WEIGHT: 138.89 LBS | BODY MASS INDEX: 27.27 KG/M2

## 2019-04-12 DIAGNOSIS — M25.511 RIGHT SHOULDER PAIN, UNSPECIFIED CHRONICITY: Primary | ICD-10-CM

## 2019-04-12 DIAGNOSIS — M25.551 RIGHT HIP PAIN: ICD-10-CM

## 2019-04-12 DIAGNOSIS — M75.81 TENDINITIS OF RIGHT ROTATOR CUFF: ICD-10-CM

## 2019-04-12 DIAGNOSIS — M70.61 TROCHANTERIC BURSITIS OF RIGHT HIP: ICD-10-CM

## 2019-04-12 PROCEDURE — 99214 OFFICE O/P EST MOD 30 MIN: CPT | Performed by: ORTHOPAEDIC SURGERY

## 2019-04-12 NOTE — PROGRESS NOTES
Persistent  Frequency of Pain: Intermittent  Aggravating Factors: Walking, Stairs, Bending  Limiting Behavior: Some  Relieving Factors: Rest  Result of Injury: No  Work-Related Injury: No  Are there other pain locations you wish to document?: No]      Work Status/Functionality:     Past Medical History: Medical history form was reviewed today & can be found in the media tab  Past Medical History:   Diagnosis Date    Arthritis     Chicken pox     Chills     CKD (chronic kidney disease) stage 3, GFR 30-59 ml/min ([facility] 1/10/2019    Fever     Hip pain     Hyperlipidemia     Hypertension     Measles     Miscarriage     Osteoarthritis     Pneumonia     Poor circulation     Ringing in ears     Sacroiliitis ([facility] [address] 4/15/2016    Swelling of both ankles     Tonsillitis       Past Surgical History:     Past Surgical History:   Procedure Laterality Date    COLONOSCOPY  11/09/2016    normal/ hemmoroids    SHOULDER SURGERY       Current Medications:     Current Outpatient Medications:     lisinopril (PRINIVIL;ZESTRIL) 20 MG tablet, TAKE ONE TABLET BY MOUTH ONCE DAILY, Disp: 90 tablet, Rfl: 1    traZODone (DESYREL) 50 MG tablet, Take 1 tablet by mouth nightly, Disp: 90 tablet, Rfl: 1    atorvastatin (LIPITOR) 10 MG tablet, Take 1 tablet by mouth daily, Disp: 90 tablet, Rfl: 1    Cholecalciferol (VITAMIN D) 2000 units CAPS capsule, Take by mouth, Disp: , Rfl:     Calcium Carbonate-Vit D-Min (CALCIUM 1200 PO), Take by mouth, Disp: , Rfl:     Cinnamon 500 MG TABS, Take  by mouth., Disp: , Rfl:     FISH OIL, 1 capsule by Does not apply route., Disp: , Rfl:     aspirin 81 MG tablet, Take 81 mg by mouth daily. , Disp: , Rfl:   Allergies:  Toprol xl [metoprolol]  Social History:    reports that she has never smoked. She has never used smokeless tobacco. She reports that she does not drink alcohol or use drugs.   Family History:   Family History   Problem Relation Age of Onset    Heart Disease Mother     Stroke Mother     High Blood Pressure Mother     Arthritis Mother     High Blood Pressure Brother        REVIEW OF SYSTEMS:   For new problems, a full review of systems will be found scanned in the patient's chart. CONSTITUTIONAL: Denies unexplained weight loss, fevers, chills   NEUROLOGICAL: Denies unsteady gait or progressive weakness  SKIN: Denies skin changes, delayed healing, rash, itching       PHYSICAL EXAM:    Vitals: Height 5' (1.524 m), weight 138 lb 14.2 oz (63 kg), not currently breastfeeding. GENERAL EXAM:  · General Apparence: Patient is adequately groomed with no evidence of malnutrition. · Orientation: The patient is oriented to time, place and person. · Mood & Affect:The patient's mood and affect are appropriate       RIGHT hip PHYSICAL EXAMINATION:  · Inspection:  No deformity ecchymosis or erythema    · Palpation:  Mild tenderness of the lateral hip over the greater trochanter, no significant tenderness along the midline of the lumbar spine or in the buttock. No groin tenderness      · Range of Motion: Internal and external rotation of the RIGHT hip is tolerated today    · Strength: Hip flexor hip abductor and adductor are intact. · Special Tests:  No pain with logroll testing. · Skin:  There are no rashes, ulcerations or lesions. · There are no  dysvascular changes     Gait & station: She ambulates today unassisted      Additional Examinations:        I also evaluated the RIGHT shoulder. No significant bony tenderness ecchymosis or erythema. Active forward flexion to about 100°, active abduction to 80°. Mild strength deficits with rotator cuff testing secondary to pain      Diagnostic Testing: The following x rays were read and interpreted by myself      1.  2 x-ray views of the RIGHT hip +3 x-ray views of the RIGHT shoulder were obtained today. No evidence of acute bony abnormalities.   Mild OA of the RIGHT hip and general distal disease limited by a study.    Orders     Orders Placed This Encounter   Procedures    XR SHOULDER RIGHT (MIN 2 VIEWS)     Standing Status:   Future     Number of Occurrences:   1     Standing Expiration Date:   5/12/2019    XR HIP RIGHT (2-3 VIEWS)     Standing Status:   Future     Number of Occurrences:   1     Standing Expiration Date:   5/12/2019         Assessment / Treatment Plan:     1. Right shoulder rotator cuff tendinitis: We discussed treatment options, the patient is not interested in a cortisone injection and really not interested in any aggressive treatment. We will begin with full tearing gel and home exercises    2. RIGHT hip trochanteric bursitis: Again the patient has no interested in therapy or shot. We will use the gel. Follow-up in 4-6 weeks as needed    I have personally performed and/or participated in the history, exam and medical decision making and agree with all pertinent clinical information. I have also reviewed and agree with the past medical, family and social history unless otherwise noted. This dictation was performed with a verbal recognition program (DRAGON) and it was checked for errors. It is possible that there are still dictated errors within this office note. If so, please bring any errors to my attention for an addendum. All efforts were made to ensure that this office note is accurate.           Lokesh Hodge MD

## 2019-05-01 ENCOUNTER — OFFICE VISIT (OUTPATIENT)
Dept: FAMILY MEDICINE CLINIC | Age: 84
End: 2019-05-01
Payer: MEDICARE

## 2019-05-01 VITALS
HEART RATE: 68 BPM | OXYGEN SATURATION: 99 % | RESPIRATION RATE: 18 BRPM | BODY MASS INDEX: 26.31 KG/M2 | SYSTOLIC BLOOD PRESSURE: 130 MMHG | HEIGHT: 60 IN | WEIGHT: 134 LBS | DIASTOLIC BLOOD PRESSURE: 78 MMHG

## 2019-05-01 DIAGNOSIS — N18.30 CKD (CHRONIC KIDNEY DISEASE) STAGE 3, GFR 30-59 ML/MIN (HCC): ICD-10-CM

## 2019-05-01 DIAGNOSIS — G47.00 INSOMNIA, UNSPECIFIED TYPE: ICD-10-CM

## 2019-05-01 DIAGNOSIS — I10 ESSENTIAL HYPERTENSION: ICD-10-CM

## 2019-05-01 DIAGNOSIS — E78.5 HYPERLIPIDEMIA, UNSPECIFIED HYPERLIPIDEMIA TYPE: ICD-10-CM

## 2019-05-01 DIAGNOSIS — Z63.8 CAREGIVER ROLE STRAIN: ICD-10-CM

## 2019-05-01 DIAGNOSIS — Z12.31 ENCOUNTER FOR SCREENING MAMMOGRAM FOR BREAST CANCER: ICD-10-CM

## 2019-05-01 LAB
ALBUMIN SERPL-MCNC: 4.6 G/DL (ref 3.4–5)
ANION GAP SERPL CALCULATED.3IONS-SCNC: 14 MMOL/L (ref 3–16)
BUN BLDV-MCNC: 20 MG/DL (ref 7–20)
CALCIUM SERPL-MCNC: 10.3 MG/DL (ref 8.3–10.6)
CHLORIDE BLD-SCNC: 101 MMOL/L (ref 99–110)
CO2: 25 MMOL/L (ref 21–32)
CREAT SERPL-MCNC: 1.2 MG/DL (ref 0.6–1.2)
GFR AFRICAN AMERICAN: 52
GFR NON-AFRICAN AMERICAN: 43
GLUCOSE BLD-MCNC: 110 MG/DL (ref 70–99)
PHOSPHORUS: 3.9 MG/DL (ref 2.5–4.9)
POTASSIUM SERPL-SCNC: 4.7 MMOL/L (ref 3.5–5.1)
SODIUM BLD-SCNC: 140 MMOL/L (ref 136–145)
TSH SERPL DL<=0.05 MIU/L-ACNC: 2.66 UIU/ML (ref 0.27–4.2)

## 2019-05-01 PROCEDURE — 99214 OFFICE O/P EST MOD 30 MIN: CPT | Performed by: NURSE PRACTITIONER

## 2019-05-01 SDOH — SOCIAL STABILITY - SOCIAL INSECURITY: OTHER SPECIFIED PROBLEMS RELATED TO PRIMARY SUPPORT GROUP: Z63.8

## 2019-05-01 ASSESSMENT — PATIENT HEALTH QUESTIONNAIRE - PHQ9
2. FEELING DOWN, DEPRESSED OR HOPELESS: 0
1. LITTLE INTEREST OR PLEASURE IN DOING THINGS: 0
SUM OF ALL RESPONSES TO PHQ QUESTIONS 1-9: 0
SUM OF ALL RESPONSES TO PHQ QUESTIONS 1-9: 0
SUM OF ALL RESPONSES TO PHQ9 QUESTIONS 1 & 2: 0

## 2019-05-01 NOTE — PROGRESS NOTES
Italo Beam 82 y.o. female    Chief Complaint   Patient presents with    Hypertension    Hyperlipidemia       HPI     HTN follow-up, on lisinopril, tolerates well,  No chest pains, SOB, edema, dizziness. Very physically active, now started to cut her lawn and takes care of her  who has dementia. Takes him everywhere, verbalizes it is hard at times, does not want to ask help of family,  CKD, stage 3, stopped NSAIDs. No edema. HLD, tolerates statin  Insomnia, dysphoric mood, takes trazodone, helps with symptoms. Pastmedical, surgical, family and social history were reviewed and updated with the patient. Current Outpatient Medications:     diclofenac sodium 1 % GEL, Apply 4 g topically 4 times daily, Disp: 2 Tube, Rfl: 5    lisinopril (PRINIVIL;ZESTRIL) 20 MG tablet, TAKE ONE TABLET BY MOUTH ONCE DAILY, Disp: 90 tablet, Rfl: 1    traZODone (DESYREL) 50 MG tablet, Take 1 tablet by mouth nightly, Disp: 90 tablet, Rfl: 1    atorvastatin (LIPITOR) 10 MG tablet, Take 1 tablet by mouth daily, Disp: 90 tablet, Rfl: 1    Cholecalciferol (VITAMIN D) 2000 units CAPS capsule, Take by mouth, Disp: , Rfl:     Calcium Carbonate-Vit D-Min (CALCIUM 1200 PO), Take by mouth, Disp: , Rfl:     aspirin 81 MG tablet, Take 81 mg by mouth daily. , Disp: , Rfl:     Cinnamon 500 MG TABS, Take  by mouth., Disp: , Rfl:     Review of Systems   Constitutional: Positive for fatigue. Respiratory: Negative for cough, chest tightness and shortness of breath. Cardiovascular: Negative for chest pain, palpitations and leg swelling. Gastrointestinal: Negative. Musculoskeletal: Positive for arthralgias. Skin: Negative for color change and rash. Neurological: Negative for dizziness and headaches. Psychiatric/Behavioral: Positive for dysphoric mood and sleep disturbance. Negative for self-injury and suicidal ideas. Physical Exam   Constitutional: She is oriented to person, place, and time.  She appears well-developed and well-nourished. No distress. Eyes: Conjunctivae are normal. No scleral icterus. Neck: Neck supple. No JVD present. No thyromegaly present. Cardiovascular: Normal rate, regular rhythm, normal heart sounds and intact distal pulses. No edema in BLEs   Pulmonary/Chest: Effort normal and breath sounds normal.   Abdominal: Soft. Bowel sounds are normal. She exhibits no distension. Lymphadenopathy:     She has no cervical adenopathy. Neurological: She is alert and oriented to person, place, and time. Psychiatric: She has a normal mood and affect. Her behavior is normal. Thought content normal.   Nursing note and vitals reviewed. Vitals:    05/01/19 1019   BP: 130/78   Pulse:    Resp:    SpO2:        Assessment    1. Essential hypertension    2. CKD (chronic kidney disease) stage 3, GFR 30-59 ml/min (HCC)    3. Hyperlipidemia, unspecified hyperlipidemia type    4. Insomnia, unspecified type    5. Caregiver role strain    6. Encounter for screening mammogram for breast cancer        Plan    Esteban Mosquera was seen today for hypertension, hyperlipidemia and insomnia. Diagnoses and all orders for this visit:    Essential hypertension        -     Controlled, continue current  -     TSH without Reflex  -     RENAL FUNCTION PANEL    CKD (chronic kidney disease) stage 3, GFR 30-59 ml/min (HCC)        -     Recheck renal panel    Hyperlipidemia, unspecified hyperlipidemia type       -      Continue current, recheck lipids next visit    Insomnia, unspecified type        -     Continue current     Caregiver role strain        -     Discussed ok to ask for help, provided resources for senior citizens/caregivers in her county. Encounter for screening mammogram for breast cancer  -     [facility] Digital Screen Bilateral [[id number]];  Future

## 2019-05-03 ASSESSMENT — ENCOUNTER SYMPTOMS
COLOR CHANGE: 0
COUGH: 0
CHEST TIGHTNESS: 0
SHORTNESS OF BREATH: 0
GASTROINTESTINAL NEGATIVE: 1

## 2019-07-18 RX ORDER — ATORVASTATIN CALCIUM 10 MG/1
TABLET, FILM COATED ORAL
Qty: 90 TABLET | Refills: 1 | Status: SHIPPED | OUTPATIENT
Start: 2019-07-18 | End: 2020-01-22 | Stop reason: SDUPTHER

## 2019-10-15 ENCOUNTER — OFFICE VISIT (OUTPATIENT)
Dept: FAMILY MEDICINE CLINIC | Age: 84
End: 2019-10-15
Payer: MEDICARE

## 2019-10-15 VITALS
DIASTOLIC BLOOD PRESSURE: 72 MMHG | WEIGHT: 135 LBS | OXYGEN SATURATION: 98 % | HEART RATE: 72 BPM | RESPIRATION RATE: 16 BRPM | BODY MASS INDEX: 26.37 KG/M2 | SYSTOLIC BLOOD PRESSURE: 132 MMHG

## 2019-10-15 DIAGNOSIS — R73.03 PREDIABETES: ICD-10-CM

## 2019-10-15 DIAGNOSIS — G47.00 INSOMNIA, UNSPECIFIED TYPE: ICD-10-CM

## 2019-10-15 DIAGNOSIS — I10 ESSENTIAL HYPERTENSION: ICD-10-CM

## 2019-10-15 DIAGNOSIS — M15.9 PRIMARY OSTEOARTHRITIS INVOLVING MULTIPLE JOINTS: ICD-10-CM

## 2019-10-15 DIAGNOSIS — M54.50 CHRONIC RIGHT-SIDED LOW BACK PAIN, UNSPECIFIED WHETHER SCIATICA PRESENT: ICD-10-CM

## 2019-10-15 DIAGNOSIS — N18.30 CKD (CHRONIC KIDNEY DISEASE) STAGE 3, GFR 30-59 ML/MIN (HCC): ICD-10-CM

## 2019-10-15 DIAGNOSIS — Z23 NEED FOR INFLUENZA VACCINATION: ICD-10-CM

## 2019-10-15 DIAGNOSIS — E78.5 HYPERLIPIDEMIA, UNSPECIFIED HYPERLIPIDEMIA TYPE: ICD-10-CM

## 2019-10-15 DIAGNOSIS — G89.29 CHRONIC RIGHT-SIDED LOW BACK PAIN, UNSPECIFIED WHETHER SCIATICA PRESENT: ICD-10-CM

## 2019-10-15 PROCEDURE — G0008 ADMIN INFLUENZA VIRUS VAC: HCPCS | Performed by: NURSE PRACTITIONER

## 2019-10-15 PROCEDURE — 90653 IIV ADJUVANT VACCINE IM: CPT | Performed by: NURSE PRACTITIONER

## 2019-10-15 PROCEDURE — 99214 OFFICE O/P EST MOD 30 MIN: CPT | Performed by: NURSE PRACTITIONER

## 2019-10-15 RX ORDER — HYDROCODONE BITARTRATE AND ACETAMINOPHEN 5; 325 MG/1; MG/1
1 TABLET ORAL EVERY 6 HOURS PRN
Qty: 28 TABLET | Refills: 0 | Status: SHIPPED | OUTPATIENT
Start: 2019-10-15 | End: 2020-07-31 | Stop reason: SDUPTHER

## 2019-10-15 RX ORDER — HYDROCODONE BITARTRATE AND ACETAMINOPHEN 5; 325 MG/1; MG/1
1 TABLET ORAL EVERY 6 HOURS PRN
COMMUNITY
End: 2019-10-15

## 2019-10-17 DIAGNOSIS — N18.30 CKD (CHRONIC KIDNEY DISEASE) STAGE 3, GFR 30-59 ML/MIN (HCC): ICD-10-CM

## 2019-10-17 DIAGNOSIS — R73.03 PREDIABETES: ICD-10-CM

## 2019-10-17 DIAGNOSIS — I10 ESSENTIAL HYPERTENSION: ICD-10-CM

## 2019-10-17 DIAGNOSIS — E78.5 HYPERLIPIDEMIA, UNSPECIFIED HYPERLIPIDEMIA TYPE: ICD-10-CM

## 2019-10-17 LAB
A/G RATIO: 1.9 (ref 1.1–2.2)
ALBUMIN SERPL-MCNC: 4.5 G/DL (ref 3.4–5)
ALP BLD-CCNC: 44 U/L (ref 40–129)
ALT SERPL-CCNC: 12 U/L (ref 10–40)
ANION GAP SERPL CALCULATED.3IONS-SCNC: 14 MMOL/L (ref 3–16)
AST SERPL-CCNC: 17 U/L (ref 15–37)
BASOPHILS ABSOLUTE: 0 K/UL (ref 0–0.2)
BASOPHILS RELATIVE PERCENT: 0.5 %
BILIRUB SERPL-MCNC: 0.3 MG/DL (ref 0–1)
BUN BLDV-MCNC: 25 MG/DL (ref 7–20)
CALCIUM SERPL-MCNC: 9.5 MG/DL (ref 8.3–10.6)
CHLORIDE BLD-SCNC: 104 MMOL/L (ref 99–110)
CHOLESTEROL, TOTAL: 145 MG/DL (ref 0–199)
CO2: 24 MMOL/L (ref 21–32)
CREAT SERPL-MCNC: 1.1 MG/DL (ref 0.6–1.2)
EOSINOPHILS ABSOLUTE: 0.1 K/UL (ref 0–0.6)
EOSINOPHILS RELATIVE PERCENT: 2.4 %
GFR AFRICAN AMERICAN: 57
GFR NON-AFRICAN AMERICAN: 47
GLOBULIN: 2.4 G/DL
GLUCOSE BLD-MCNC: 99 MG/DL (ref 70–99)
HCT VFR BLD CALC: 32.3 % (ref 36–48)
HDLC SERPL-MCNC: 73 MG/DL (ref 40–60)
HEMOGLOBIN: 10.6 G/DL (ref 12–16)
LDL CHOLESTEROL CALCULATED: 42 MG/DL
LYMPHOCYTES ABSOLUTE: 1.3 K/UL (ref 1–5.1)
LYMPHOCYTES RELATIVE PERCENT: 28 %
MCH RBC QN AUTO: 30.5 PG (ref 26–34)
MCHC RBC AUTO-ENTMCNC: 32.8 G/DL (ref 31–36)
MCV RBC AUTO: 92.8 FL (ref 80–100)
MONOCYTES ABSOLUTE: 0.4 K/UL (ref 0–1.3)
MONOCYTES RELATIVE PERCENT: 7.7 %
NEUTROPHILS ABSOLUTE: 2.9 K/UL (ref 1.7–7.7)
NEUTROPHILS RELATIVE PERCENT: 61.4 %
PDW BLD-RTO: 14.4 % (ref 12.4–15.4)
PLATELET # BLD: 184 K/UL (ref 135–450)
PMV BLD AUTO: 9 FL (ref 5–10.5)
POTASSIUM SERPL-SCNC: 4.3 MMOL/L (ref 3.5–5.1)
RBC # BLD: 3.48 M/UL (ref 4–5.2)
SODIUM BLD-SCNC: 142 MMOL/L (ref 136–145)
TOTAL PROTEIN: 6.9 G/DL (ref 6.4–8.2)
TRIGL SERPL-MCNC: 148 MG/DL (ref 0–150)
VLDLC SERPL CALC-MCNC: 30 MG/DL
WBC # BLD: 4.8 K/UL (ref 4–11)

## 2019-10-18 DIAGNOSIS — D64.9 ANEMIA, UNSPECIFIED TYPE: Primary | ICD-10-CM

## 2019-10-18 LAB
ESTIMATED AVERAGE GLUCOSE: 128.4 MG/DL
FERRITIN: 163.9 NG/ML (ref 15–150)
FOLATE: 18.39 NG/ML (ref 4.78–24.2)
HBA1C MFR BLD: 6.1 %
IRON SATURATION: 31 % (ref 15–50)
IRON: 91 UG/DL (ref 37–145)
TOTAL IRON BINDING CAPACITY: 292 UG/DL (ref 260–445)
VITAMIN B-12: 333 PG/ML (ref 211–911)

## 2019-10-27 ASSESSMENT — ENCOUNTER SYMPTOMS
ABDOMINAL PAIN: 0
CHEST TIGHTNESS: 0
BLOOD IN STOOL: 0
BACK PAIN: 1
COUGH: 0
WHEEZING: 0
SHORTNESS OF BREATH: 0

## 2019-10-30 DIAGNOSIS — I10 ESSENTIAL HYPERTENSION: ICD-10-CM

## 2019-10-31 RX ORDER — LISINOPRIL 20 MG/1
TABLET ORAL
Qty: 30 TABLET | Refills: 5 | Status: SHIPPED | OUTPATIENT
Start: 2019-10-31 | End: 2020-03-05 | Stop reason: SDUPTHER

## 2019-12-10 ENCOUNTER — TELEPHONE (OUTPATIENT)
Dept: FAMILY MEDICINE CLINIC | Age: 84
End: 2019-12-10

## 2019-12-23 ENCOUNTER — TELEPHONE (OUTPATIENT)
Dept: FAMILY MEDICINE CLINIC | Age: 84
End: 2019-12-23

## 2020-01-22 NOTE — TELEPHONE ENCOUNTER
Last Seen: 10/15/2019    Last Writen: 7- #90 x 1 refill    Next Appointment: Not scheduled    Requested Prescriptions     Pending Prescriptions Disp Refills    atorvastatin (LIPITOR) 10 MG tablet 90 tablet 1     Sig: TAKE 1 TABLET BY MOUTH ONCE DAILY

## 2020-01-23 RX ORDER — ATORVASTATIN CALCIUM 10 MG/1
TABLET, FILM COATED ORAL
Qty: 90 TABLET | Refills: 1 | Status: SHIPPED | OUTPATIENT
Start: 2020-01-23 | End: 2020-03-05 | Stop reason: SDUPTHER

## 2020-03-05 ENCOUNTER — OFFICE VISIT (OUTPATIENT)
Dept: FAMILY MEDICINE CLINIC | Age: 85
End: 2020-03-05
Payer: MEDICARE

## 2020-03-05 VITALS
HEIGHT: 64 IN | SYSTOLIC BLOOD PRESSURE: 130 MMHG | OXYGEN SATURATION: 98 % | BODY MASS INDEX: 22.84 KG/M2 | DIASTOLIC BLOOD PRESSURE: 72 MMHG | WEIGHT: 133.8 LBS | HEART RATE: 67 BPM

## 2020-03-05 VITALS — DIASTOLIC BLOOD PRESSURE: 72 MMHG | HEART RATE: 67 BPM | SYSTOLIC BLOOD PRESSURE: 130 MMHG | OXYGEN SATURATION: 98 %

## 2020-03-05 LAB
A/G RATIO: 1.6 (ref 1.1–2.2)
ALBUMIN SERPL-MCNC: 4.5 G/DL (ref 3.4–5)
ALP BLD-CCNC: 47 U/L (ref 40–129)
ALT SERPL-CCNC: 11 U/L (ref 10–40)
ANION GAP SERPL CALCULATED.3IONS-SCNC: 15 MMOL/L (ref 3–16)
AST SERPL-CCNC: 19 U/L (ref 15–37)
BASOPHILS ABSOLUTE: 0 K/UL (ref 0–0.2)
BASOPHILS RELATIVE PERCENT: 0.5 %
BILIRUB SERPL-MCNC: 0.3 MG/DL (ref 0–1)
BUN BLDV-MCNC: 28 MG/DL (ref 7–20)
CALCIUM SERPL-MCNC: 10 MG/DL (ref 8.3–10.6)
CHLORIDE BLD-SCNC: 104 MMOL/L (ref 99–110)
CO2: 25 MMOL/L (ref 21–32)
CREAT SERPL-MCNC: 1.2 MG/DL (ref 0.6–1.2)
EOSINOPHILS ABSOLUTE: 0.1 K/UL (ref 0–0.6)
EOSINOPHILS RELATIVE PERCENT: 1.7 %
GFR AFRICAN AMERICAN: 52
GFR NON-AFRICAN AMERICAN: 43
GLOBULIN: 2.8 G/DL
GLUCOSE BLD-MCNC: 107 MG/DL (ref 70–99)
HCT VFR BLD CALC: 33.7 % (ref 36–48)
HEMOGLOBIN: 11.2 G/DL (ref 12–16)
LYMPHOCYTES ABSOLUTE: 1.6 K/UL (ref 1–5.1)
LYMPHOCYTES RELATIVE PERCENT: 31.9 %
MCH RBC QN AUTO: 30.9 PG (ref 26–34)
MCHC RBC AUTO-ENTMCNC: 33.2 G/DL (ref 31–36)
MCV RBC AUTO: 93.3 FL (ref 80–100)
MONOCYTES ABSOLUTE: 0.3 K/UL (ref 0–1.3)
MONOCYTES RELATIVE PERCENT: 6.1 %
NEUTROPHILS ABSOLUTE: 3.1 K/UL (ref 1.7–7.7)
NEUTROPHILS RELATIVE PERCENT: 59.8 %
PDW BLD-RTO: 14.9 % (ref 12.4–15.4)
PLATELET # BLD: 176 K/UL (ref 135–450)
PMV BLD AUTO: 9.9 FL (ref 5–10.5)
POTASSIUM SERPL-SCNC: 5 MMOL/L (ref 3.5–5.1)
RBC # BLD: 3.61 M/UL (ref 4–5.2)
SODIUM BLD-SCNC: 144 MMOL/L (ref 136–145)
TOTAL PROTEIN: 7.3 G/DL (ref 6.4–8.2)
WBC # BLD: 5.1 K/UL (ref 4–11)

## 2020-03-05 PROCEDURE — 99214 OFFICE O/P EST MOD 30 MIN: CPT | Performed by: FAMILY MEDICINE

## 2020-03-05 PROCEDURE — G8510 SCR DEP NEG, NO PLAN REQD: HCPCS | Performed by: FAMILY MEDICINE

## 2020-03-05 PROCEDURE — G0439 PPPS, SUBSEQ VISIT: HCPCS | Performed by: FAMILY MEDICINE

## 2020-03-05 RX ORDER — ATORVASTATIN CALCIUM 10 MG/1
TABLET, FILM COATED ORAL
Qty: 90 TABLET | Refills: 1 | Status: SHIPPED | OUTPATIENT
Start: 2020-03-05 | End: 2021-02-16

## 2020-03-05 RX ORDER — LISINOPRIL 20 MG/1
TABLET ORAL
Qty: 90 TABLET | Refills: 1 | Status: SHIPPED | OUTPATIENT
Start: 2020-03-05 | End: 2020-11-10 | Stop reason: SDUPTHER

## 2020-03-05 ASSESSMENT — ENCOUNTER SYMPTOMS: COUGH: 0

## 2020-03-05 ASSESSMENT — PATIENT HEALTH QUESTIONNAIRE - PHQ9
SUM OF ALL RESPONSES TO PHQ9 QUESTIONS 1 & 2: 0
2. FEELING DOWN, DEPRESSED OR HOPELESS: 0
1. LITTLE INTEREST OR PLEASURE IN DOING THINGS: 0
SUM OF ALL RESPONSES TO PHQ QUESTIONS 1-9: 0
SUM OF ALL RESPONSES TO PHQ QUESTIONS 1-9: 0

## 2020-03-05 ASSESSMENT — LIFESTYLE VARIABLES: HOW OFTEN DO YOU HAVE A DRINK CONTAINING ALCOHOL: 0

## 2020-03-05 NOTE — PROGRESS NOTES
Medicare Annual Wellness Visit  Name: Janusz Mascorro Date: 3/5/2020   MRN: <[id number]> Sex: Female   Age: 82 y.o. Ethnicity: Non-/Non    : 1935 Race: Melissa Jauregui is here for Medicare AWV    Screenings for behavioral, psychosocial and functional/safety risks, and cognitive dysfunction are all negative except as indicated below. These results, as well as other patient data from the [address] form, are documented in Flowsheets linked to this Encounter. Allergies   Allergen Reactions    Toprol Xl [Metoprolol]        Prior to Visit Medications    Medication Sig Taking? Authorizing Provider   atorvastatin (LIPITOR) 10 MG tablet TAKE 1 TABLET BY MOUTH ONCE DAILY Yes Jorden Malik DO   lisinopril (PRINIVIL;ZESTRIL) 20 MG tablet TAKE 1 TABLET BY MOUTH ONCE DAILY Yes Jorden Malik DO   diclofenac sodium 1 % GEL Apply 4 g topically 4 times daily Yes Keila Campbell MD   traZODone (DESYREL) 50 MG tablet Take 1 tablet by mouth nightly Yes DUY Quiros - CNP   Cholecalciferol (VITAMIN D) 2000 units CAPS capsule Take by mouth Yes Historical Provider, MD   Calcium Carbonate-Vit D-Min (CALCIUM 1200 PO) Take by mouth Yes Historical Provider, MD   Cinnamon 500 MG TABS Take  by mouth. Yes Historical Provider, MD   aspirin 81 MG tablet Take 81 mg by mouth daily.  Yes Historical Provider, MD       Past Medical History:   Diagnosis Date    Arthritis     Chicken pox     Chills     CKD (chronic kidney disease) stage 3, GFR 30-59 ml/min ([facility]) 1/10/2019    Fever     Hip pain     Hyperlipidemia     Hypertension     Measles     Miscarriage     Osteoarthritis     Pneumonia     Poor circulation     Ringing in ears     Sacroiliitis ([facility] [address] 4/15/2016    Swelling of both ankles     Tonsillitis        Past Surgical History:   Procedure Laterality Date    COLONOSCOPY  2016    normal/ hemmoroids    SHOULDER SURGERY         Family History   Problem Relation Pneumococcal Conjugate 13-valent ([id number]) 01/08/2016    Pneumococcal Polysaccharide ([id number]) 01/05/2018        Health Maintenance   Topic Date Due    DTaP/Tdap/Td vaccine (1 - Tdap) 04/04/1946    Shingles Vaccine (1 of 2) 04/04/1987    Breast cancer screen  10/12/2012    Annual Wellness Visit (AWV)  05/29/2019    Lipid screen  10/17/2020    Potassium monitoring  10/17/2020    Creatinine monitoring  10/17/2020    DEXA (modify frequency per FRAX score)  Completed    Flu vaccine  Completed    Pneumococcal 65+ years Vaccine  Completed    Hepatitis A vaccine  Aged Out    Hepatitis B vaccine  Aged Out    Hib vaccine  Aged Out    Meningococcal (ACWY) vaccine  Aged Out     Recommendations for [employer] Due: see orders and patient instructions/AVS.  . Recommended screening schedule for the next 5-10 years is provided to the patient in written form: see Patient Instructions/AVS.    David DENG LPN, 0/7/5744, performed the documented evaluation under the direct supervision of the attending physician. This encounter was performed under Annie welch DOs, direct supervision, 3/5/2020.

## 2020-03-05 NOTE — PATIENT INSTRUCTIONS
Personalized Preventive Plan for Zoie Ricci - 3/5/2020  Medicare offers a range of preventive health benefits. Some of the tests and screenings are paid in full while other may be subject to a deductible, co-insurance, and/or copay. Some of these benefits include a comprehensive review of your medical history including lifestyle, illnesses that may run in your family, and various assessments and screenings as appropriate. After reviewing your medical record and screening and assessments performed today your provider may have ordered immunizations, labs, imaging, and/or referrals for you. A list of these orders (if applicable) as well as your Preventive Care list are included within your After Visit Summary for your review. Other Preventive Recommendations:    · A preventive eye exam performed by an eye specialist is recommended every 1-2 years to screen for glaucoma; cataracts, macular degeneration, and other eye disorders. · A preventive dental visit is recommended every 6 months. · Try to get at least 150 minutes of exercise per week or 10,000 steps per day on a pedometer . · Order or download the FREE \"Exercise & Physical Activity: Your Everyday Guide\" from [employer]. Call 5-173.490.8463 or search [employer] online. · You need 1983-4074 mg of calcium and 7358-1737 IU of vitamin D per day. It is possible to meet your calcium requirement with diet alone, but a vitamin D supplement is usually necessary to meet this goal.  · When exposed to the sun, use a sunscreen that protects against both UVA and UVB radiation with an SPF of 30 or greater. Reapply every 2 to 3 hours or after sweating, drying off with a towel, or swimming. · Always wear a seat belt when traveling in a car. Always wear a helmet when riding a bicycle or motorcycle. Heart-Healthy Diet   Sodium, Fat, and Cholesterol Controlled Diet       What Is a Heart Healthy Diet?    A heart-healthy diet is one that limits sodium , certain types of fat , and cholesterol . This type of diet is recommended for:   People with any form of cardiovascular disease (eg, coronary heart disease , peripheral vascular disease , previous heart attack , previous stroke )   People with risk factors for cardiovascular disease, such as high blood pressure , high cholesterol , or diabetes   Anyone who wants to lower their risk of developing cardiovascular disease   Sodium    Sodium is a mineral found in many foods. In general, most people consume much more sodium than they need. Diets high in sodium can increase blood pressure and lead to edema (water retention). On a heart-healthy diet, you should consume no more than 2,300 mg (milligrams) of sodium per dayabout the amount in one teaspoon of table salt. The foods highest in sodium include table salt (about 50% sodium), processed foods, convenience foods, and preserved foods. Cholesterol    Cholesterol is a fat-like, waxy substance in your blood. Our bodies make some cholesterol. It is also found in animal products, with the highest amounts in fatty meat, egg yolks, whole milk, cheese, shellfish, and organ meats. On a heart-healthy diet, you should limit your cholesterol intake to less than 200 mg per day. It is normal and important to have some cholesterol in your bloodstream. But too much cholesterol can cause plaque to build up within your arteries, which can eventually lead to a heart attack or stroke. The two types of cholesterol that are most commonly referred to are:   Low-density lipoprotein (LDL) cholesterol  Also known as bad cholesterol, this is the cholesterol that tends to build up along your arteries. Bad cholesterol levels are increased by eating fats that are saturated or hydrogenated. Optimal level of this cholesterol is less than 100. Over 130 starts to get risky for heart disease.    High-density lipoprotein (HDL) cholesterol  Also known as good cholesterol, this type of cholesterol actually carries cholesterol away from your arteries and may, therefore, help lower your risk of having a heart attack. You want this level to be high (ideally greater than 60). It is a risk to have a level less than 40. You can raise this good cholesterol by eating olive oil, canola oil, avocados, or nuts. Exercise raises this level, too. Fat    Fat is calorie dense and packs a lot of calories into a small amount of food. Even though fats should be limited due to their high calorie content, not all fats are bad. In fact, some fats are quite healthful. Fat can be broken down into four main types. The good-for-you fats are:   Monounsaturated fat  found in oils such as olive and canola, avocados, and nuts and natural nut butters; can decrease cholesterol levels, while keeping levels of HDL cholesterol high   Polyunsaturated fat  found in oils such as safflower, sunflower, soybean, corn, and sesame; can decrease total cholesterol and LDL cholesterol   Omega-3 fatty acids  particularly those found in fatty fish (such as salmon, trout, tuna, mackerel, herring, and sardines); can decrease risk of arrhythmias, decrease triglyceride levels, and slightly lower blood pressure   The fats that you want to limit are:   Saturated fat  found in animal products, many fast foods, and a few vegetables; increases total blood cholesterol, including LDL levels   Animal fats that are saturated include: butter, lard, whole-milk dairy products, meat fat, and poultry skin   Vegetable fats that are saturated include: hydrogenated shortening, palm oil, coconut oil, cocoa butter   Hydrogenated or trans fat  found in margarine and vegetable shortening, most shelf stable snack foods, and fried foods; increases LDL and decreases HDL     It is generally recommended that you limit your total fat for the day to less than 30% of your total calories.  If you follow an 1800-calorie heart healthy diet, for and cholesterol amounts. For products low in sodium, look for sodium free, very low sodium, low sodium, no added salt, and unsalted   Skip the salt when cooking or at the table; if food needs more flavor, get creative and try out different herbs and spices. Garlic and onion also add substantial flavor to foods. Trim any visible fat off meat and poultry before cooking, and drain the fat off after villanueva. Use cooking methods that require little or no added fat, such as grilling, boiling, baking, poaching, broiling, roasting, steaming, stir-frying, and sauting. Avoid fast food and convenience food. They tend to be high in saturated and trans fat and have a lot of added salt. Talk to a registered dietitian for individualized diet advice. Last Reviewed: March 2011 Erica Hinds MS, MPH, RD   Updated: 3/29/2011   ·     Keep Your Memory Vanessa Johnson       Many factors can affect your ability to remembera hectic lifestyle, aging, stress, chronic disease, and certain medicines. But, there are steps you can take to sharpen your mind and help preserve your memory. Challenge Your Brain   Regularly challenging your mind may help keeps it in top shape. Good mental exercises include:   Crossword puzzlesUse a dictionary if you need it; you will learn more that way. Brainteasers Try some! Crafts, such as wood working and sewing   Hobbies, such as gardening and building model airplanes   SocializingVisit old friends or join groups to meet new ones. Reading   Learning a new language   Taking a class, whether it be art history or oralia chi   TravelingExperience the food, history, and culture of your destination   Learning to use a computer   Going to museums, the theater, or thought-provoking movies   Changing things in your daily life, such as reversing your pattern in the grocery store or brushing your teeth using your nondominant hand   Use Memory Aids   There is no need to remember every detail on your own.  These memory aids can help:   Calendars and day planners   Electronic organizers to store all sorts of helpful informationThese devices can \"beep\" to remind you of appointments. A book of days to record birthdays, anniversaries, and other occasions that occur on the same date every year   Detailed \"to-do\" lists and strategically placed sticky notes   Quick \"study\" sessionsBefore a gathering, review who will be there so their names will be fresh in your mind. Establish routinesFor example, keep your keys, wallet, and umbrella in the same place all the time or take medicine with your 8:00 AM glass of juice   Live a Healthy Life   Many actions that will keep your body strong will do the same for your mind. For example:   Talk to Your Doctor About Herbs and Supplements    Malnutrition and vitamin deficiencies can impair your mental function. For example, vitamin B12 deficiency can cause a range of symptoms, including confusion. But, what if your nutritional needs are being met? Can herbs and supplements still offer a benefit? Researchers have investigated a range of natural remedies, such as ginkgo , ginseng , and the supplement phosphatidylserine (PS). So far, though, the evidence is inconsistent as to whether these products can improve memory or thinking. If you are interested in taking herbs and supplements, talk to your doctor first because they may interact with other medicines that you are taking. Exercise Regularly    Among the many benefits of regular exercise are increased blood flow to the brain and decreased risk of certain diseases that can interfere with memory function. One study found that even moderate exercise has a beneficial effect. Examples of \"moderate\" exercise include:   Playing 18 holes of golf once a week, without a cart   Playing tennis twice a week   Walking one mile per day   Manage Stress    It can be tough to remember what is important when your mind is cluttered.  Make time for wood floors can be particularly slippery. Stairs should always have handrails and be carpeted or fitted with a non-skid tread. Is your telephone easily reachable. Is the cord safely tucked away? Room by Room   According to the Association of Aging, bathrooms and kulwinder are the two most potentially hazardous rooms in your home. In the Kitchen    Be sure your stove is in proper working order and always make sure burners and the oven are off before you go out or go to sleep. Keep pots on the back burners, turn handles away from the front of the stove, and keep stove clean and free of grease build-up. Kitchen ventilation systems and range exhausts should be working properly. Keep flammable objects such as towels and pot holders away from the cooking area except when in use. Make sure kitchen curtains are tied back. Move cords and appliances away from the sink and hot surfaces. If extension cords are needed, install wiring guides so they do not hang over the sink, range, or working areas. Look for coffee pots, kettles and toaster ovens with automatic shut-offs. Keep a mop handy in the kitchen so you can wipe up spills instantly. You should also have a small fire extinguisher. Arrange your kitchen with frequently used items on lower shelves to avoid the need to stand on a stepstool to reach them. Make sure countertops are well-lit to avoid injuries while cutting and preparing food. In the Bathroom    Use a non-slip mat or decals in the tub and shower, since wet, soapy tile or porcelain surfaces are extremely slippery. Make sure bathroom rugs are non-skid or tape them firmly to the floor. Bathtubs should have at least one, preferably two, grab bars, firmly attached to structural supports in the wall. (Do not use built-in soap holders or glass shower doors as grab bars.)    Tub seats fitted with non-slip material on the legs allow you to wash sitting down.  For people with limited mobility, bathtub transfer benches allow you to slide safely into the tub. Raised toilet seats and toilet safety rails are helpful for those with knee or hip problems. In the [facility]    Make sure you use a nightlight and that the area around your bed is clear of potential obstacles. Be careful with electric blankets and never go to sleep with a heating pad, which can cause serious burns even if on a low setting. Use fire-resistant mattress covers and pillows, and NEVER smoke in bed. Keep a phone next to the bed that is programmed to dial 911 at the push of a button. If you have a chronic condition, you may want to sign on with an automatic call-in service. Typically the system includes a small pendant that connects directly to an emergency medical voice-response system. You should also make arrangements to stay in contact with someonefriend, neighbor, family memberon a regular schedule. Fire Prevention   According to the [employer] (Smoke Alarms for Every) [address] [place] [address], senior citizens are one of the two highest risk groups for death and serious injuries due to residential fires. When cooking, wear short-sleeved items, never a bulky long-sleeved robe. The [facility]'s Safety Checklist for Older Consumers emphasizes the importance of checking basements, garages, workshops and storage areas for fire hazards, such as volatile liquids, piles of old rags or clothing and overloaded circuits. Never smoke in bed or when lying down on a couch or recliner chair. Small portable electric or kerosene heaters are responsible for many home fires and should be used cautiously if at all. If you do use one, be sure to keep them away from flammable materials. In case of fire, make sure you have a pre-established emergency exit plan. Have a professional check your fireplace and other fuel-burning appliances yearly.     Helping Hands   Baby boomers entering the niño years will continue to see the development of new products to help older adults live safely and independently in spite of age-related changes. Making Life More Livable  , by [employer], lists over 1,000 products for \"living well in the mature years,\" such as bathing and mobility aids, household security devices, ergonomically designed knives and peelers, and faucet valves and knobs for temperature control. Medical supply stores and organizations are good sources of information about products that improve your quality of life and insure your safety. Last Reviewed: November 2009 Braden Ahumada, MD   Updated: 3/7/2011     ·   Personalized Preventive Plan for America Arias - 3/5/2020  Medicare offers a range of preventive health benefits. Some of the tests and screenings are paid in full while other may be subject to a deductible, co-insurance, and/or copay. Some of these benefits include a comprehensive review of your medical history including lifestyle, illnesses that may run in your family, and various assessments and screenings as appropriate. After reviewing your medical record and screening and assessments performed today your provider may have ordered immunizations, labs, imaging, and/or referrals for you. A list of these orders (if applicable) as well as your Preventive Care list are included within your After Visit Summary for your review. Other Preventive Recommendations:    A preventive eye exam performed by an eye specialist is recommended every 1-2 years to screen for glaucoma; cataracts, macular degeneration, and other eye disorders. A preventive dental visit is recommended every 6 months. Try to get at least 150 minutes of exercise per week or 10,000 steps per day on a pedometer . Order or download the FREE \"Exercise & Physical Activity: Your Everyday Guide\" from [employer]. Call 9-749.528.6428 or search [employer] online.   You need 3804-5984 mg of calcium and

## 2020-03-05 NOTE — PROGRESS NOTES
tablet 1    lisinopril (PRINIVIL;ZESTRIL) 20 MG tablet TAKE 1 TABLET BY MOUTH ONCE DAILY 90 tablet 1    diclofenac sodium 1 % GEL Apply 4 g topically 4 times daily 2 Tube 5    traZODone (DESYREL) 50 MG tablet Take 1 tablet by mouth nightly 90 tablet 1    Cholecalciferol (VITAMIN D) 2000 units CAPS capsule Take by mouth      Calcium Carbonate-Vit D-Min (CALCIUM 1200 PO) Take by mouth      Cinnamon 500 MG TABS Take  by mouth.  aspirin 81 MG tablet Take 81 mg by mouth daily. No current facility-administered medications for this visit. Assessment:    1. Essential hypertension    2. Hyperlipidemia, unspecified hyperlipidemia type    3. CKD (chronic kidney disease) stage 3, GFR 30-59 ml/min (HCC)        Plan:    1. Essential hypertension  Stable. Continue current medications. - CBC Auto Differential  - Comprehensive Metabolic Panel  - lisinopril (PRINIVIL;ZESTRIL) 20 MG tablet; TAKE 1 TABLET BY MOUTH ONCE DAILY  Dispense: 90 tablet; Refill: 1    2. Hyperlipidemia, unspecified hyperlipidemia type  Stable. Continue current medications. - CBC Auto Differential  - Comprehensive Metabolic Panel    3. CKD (chronic kidney disease) stage 3, GFR 30-59 ml/min (HCC)  Stable. Avoid NSAID's.   - CBC Auto Differential  - Comprehensive Metabolic Panel      Return in about 6 months (around 9/5/2020) for Hypertension, Hyperlipidemia.

## 2020-07-14 ENCOUNTER — TELEPHONE (OUTPATIENT)
Dept: FAMILY MEDICINE CLINIC | Age: 85
End: 2020-07-14

## 2020-07-31 RX ORDER — HYDROCODONE BITARTRATE AND ACETAMINOPHEN 5; 325 MG/1; MG/1
1 TABLET ORAL EVERY 6 HOURS PRN
Qty: 28 TABLET | Refills: 0 | Status: SHIPPED | OUTPATIENT
Start: 2020-07-31 | End: 2020-08-07

## 2020-07-31 NOTE — TELEPHONE ENCOUNTER
We do not do chronic narcotics at this office. I will give her a one-time fill. This patient is not my patient. Please adjust the PCP. She did not see me for NTP visit. She needs to establish with a new to provider visit to formally see another provider. Please adjust the next visit if need be or she could see another provider who was a sooner NTP visit available.

## 2020-08-14 ENCOUNTER — TELEPHONE (OUTPATIENT)
Dept: FAMILY MEDICINE CLINIC | Age: 85
End: 2020-08-14

## 2020-08-14 RX ORDER — TIZANIDINE 2 MG/1
2 TABLET ORAL NIGHTLY PRN
Qty: 30 TABLET | Refills: 0 | Status: SHIPPED | OUTPATIENT
Start: 2020-08-14 | End: 2021-01-04

## 2020-08-14 NOTE — TELEPHONE ENCOUNTER
----- Message from Darron Thapa sent at 8/14/2020 11:40 AM EDT -----  Subject: Message to Provider    QUESTIONS  Information for Provider? ricardo hernandez (says she is poa but not listed on   hipaa) requesting muscle relaxers for pt to avoid taking pain meds. ---------------------------------------------------------------------------  --------------  David GREENWOOD  What is the best way for the office to contact you? OK to leave message on   voicemail  Preferred Call Back Phone Number? [phone]  ---------------------------------------------------------------------------  --------------  SCRIPT ANSWERS  Relationship to Patient? Other  Representative Name? Celso Forrest (not on hipaa) but says she is poa  Is the Representative on the appropriate HIPAA document in Epic?  Yes

## 2020-08-14 NOTE — TELEPHONE ENCOUNTER
Sent in a muscle relaxant. She needs to establish with a new provider. I am not her PCP, I don't know why it says that. Patient saw me as a work in to get refills. She did not have a NTP visit with me. She needs one. I am booking out into December. If needing a sooner visit, she can see another provider.

## 2020-08-28 ENCOUNTER — TELEPHONE (OUTPATIENT)
Dept: FAMILY MEDICINE CLINIC | Age: 85
End: 2020-08-28

## 2020-08-28 NOTE — TELEPHONE ENCOUNTER
----- Message from Carlos Manuel Sebastian sent at 8/28/2020  2:12 PM EDT -----  Subject: Message to Provider    QUESTIONS  Information for Provider? Daughter Monserrat Mariano called asking to get a script   mailed to Patient Home to Renew her Handicap Placard. They would also like   to get a New HIPPA form mailed with it since her Next appt is not until   Nov and both Daughters should be on there. Home Address Verified  ---------------------------------------------------------------------------  --------------  CALL BACK INFO  What is the best way for the office to contact you? OK to leave message on   voicemail  Preferred Call Back Phone Number? [phone]  ---------------------------------------------------------------------------  --------------  SCRIPT ANSWERS  Relationship to Patient? Other  Representative Name? Daughter Monserrat Mariano   Mother Gio Cyr we could speak to her  Is the Representative on the appropriate HIPAA document in Epic?  Yes

## 2020-09-03 NOTE — TELEPHONE ENCOUNTER
Handicap placard printed but will be there next week to sign. Not sure about those HIPPA forms. Can someone help?

## 2020-09-04 ENCOUNTER — TELEPHONE (OUTPATIENT)
Dept: FAMILY MEDICINE CLINIC | Age: 85
End: 2020-09-04

## 2020-09-04 NOTE — TELEPHONE ENCOUNTER
Told Arely about placard. She didn't know anything about the HIPAA form, but told her about it. Told her if pt wants to add sister to HIPAA, we can mail her the form. Alok Mata is concerned that pt is not telling Dr. Antonella Bruno about the stress she's under with caring for her  (he has Alzheimers). She asked about medicine for her stress; adv that Dr. Antonella Bruno will be seeing her in November for her new pt appt. Please advise if you want to see her sooner regarding medication, even though dtr is the one asking for it.   [phone], can leave a message

## 2020-09-08 ENCOUNTER — TELEPHONE (OUTPATIENT)
Dept: FAMILY MEDICINE CLINIC | Age: 85
End: 2020-09-08

## 2020-09-08 NOTE — TELEPHONE ENCOUNTER
----- Message from Coymarcello Longoriaalejadnra sent at 9/8/2020  2:05 PM EDT -----  Subject: Message to Provider    QUESTIONS  Information for Provider? Patient daughter would like to know if   prescription for a handicap parking sticker. Please call and advise once   prescription is written.  ---------------------------------------------------------------------------  --------------  CALL BACK INFO  What is the best way for the office to contact you? OK to leave message on   voicemail  Preferred Call Back Phone Number? [phone]  ---------------------------------------------------------------------------  --------------  SCRIPT ANSWERS  Relationship to Patient? Other  Representative Name? Renato Hanson daughter  Is the Representative on the appropriate HIPAA document in Epic?  Yes

## 2020-09-17 NOTE — TELEPHONE ENCOUNTER
I don't really remember this patient. I just saw her for a work-in. She is not technically my patient yet. If having stress, no medicine recommended for now. Would recommend seeing Dr. Lilo Patel.

## 2020-10-01 ENCOUNTER — NURSE ONLY (OUTPATIENT)
Dept: FAMILY MEDICINE CLINIC | Age: 85
End: 2020-10-01
Payer: MEDICARE

## 2020-10-01 VITALS — TEMPERATURE: 98.7 F

## 2020-10-01 PROCEDURE — 90694 VACC AIIV4 NO PRSRV 0.5ML IM: CPT | Performed by: FAMILY MEDICINE

## 2020-10-01 PROCEDURE — G0008 ADMIN INFLUENZA VIRUS VAC: HCPCS | Performed by: FAMILY MEDICINE

## 2020-10-01 NOTE — PROGRESS NOTES
Vaccine Information Sheet, \"Influenza - Inactivated\"  given to Jessica Rodriguez, or parent/legal guardian of  Jessica Rodriguez and verbalized understanding. Patient responses:    Have you ever had a reaction to a flu vaccine? No  Do you have any current illness? No  Have you ever had Guillian [place] Syndrome? No  Do you have a serious allergy to any of the follow: Neomycin, Polymyxin, Thimerosal, eggs or egg products? No    Flu vaccine given per order. Please see immunization tab. Risks and benefits explained. Current VIS given.

## 2020-10-05 ENCOUNTER — TELEPHONE (OUTPATIENT)
Dept: FAMILY MEDICINE CLINIC | Age: 85
End: 2020-10-05

## 2020-10-05 DIAGNOSIS — H91.90 HEARING LOSS, UNSPECIFIED HEARING LOSS TYPE, UNSPECIFIED LATERALITY: Primary | ICD-10-CM

## 2020-10-05 NOTE — TELEPHONE ENCOUNTER
Rina Hudson from [facility] is wanting to have Dr. Calix Record give a referral for a pt for a hearing test. Please Advise [phone]

## 2020-10-18 NOTE — PROGRESS NOTES
Samantha Parry   1935, 82 y.o. female   [id number]       Referring Provider: Pedro Dsouza DO   Referral Type: In an order in [address] [place] [address]    Reason for Visit: Evaluation of suspected change in hearing. ADULT AUDIOLOGIC EVALUATION      Samantha Parry is a 82 y.o. female seen today, 10/20/2020 , for an initial audiologic evaluation. Patient was accompanied by her daughter. AUDIOLOGIC AND OTHER PERTINENT MEDICAL HISTORY:      Samantha Parry noted a perceived gradual decline in hearing, bilaterally. Patient reports family history of hearing loss in mother. No additional significant otologic or medical history was reported. Samantha Parry denied otalgia, aural fullness, otorrhea, tinnitus, dizziness, imbalance, history of falls, history of occupational/recreational noise exposure, history of head trauma and history of ear surgery. Date: 10/20/2020     IMPRESSIONS:      Today's results revealed a symmetric sensorineural hearing loss with excellent word recognition, bilaterally. Hearing loss significant enough to create hearing difficulty in most listening situations. Discussed benefits of amplification. Recommended patient contact insurance to determine possible hearing aid benefit. Patient's daughter inquired about Caption Call telephone services. Caption Call certification form was signed, faxed to [facility] Call, and scanned into media tab. ASSESSMENT AND FINDINGS:     Otoscopy revealed: Clear ear canals bilaterally    RIGHT EAR:  Hearing Sensitivity: Within normal limits at 250Hz precipitously sloping to a mild to profound sensorineural hearing loss through [facility]. Speech Recognition Threshold: 35 dB HL (consistent with Smooth Dee' PTA)  Word Recognition: Fair (72%), based on NU-6 25-word list at 100 dBHL masked using recorded speech stimuli. Tympanometry: Normal peak pressure with low compliance, Type As tympanogram, consistent with reduced tympanic membrane mobility.     LEFT EAR:  Hearing Sensitivity: Within normal limits through 500hz precipitously sloping to a mild to profound sensorineural hearing loss through [facility]. Speech Recognition Threshold: 35 dB HL (consistent with Rosmery Cordoba' PTA)  Word Recognition: Poor (60%), based on NU-6 25-word list at 105 dBHL masked using recorded speech stimuli. Tympanometry: Normal peak pressure with low compliance, Type As tympanogram, consistent with reduced tympanic membrane mobility. Reliability: Good  Transducer: Inserts    See scanned audiogram dated 10/20/2020  for results. PATIENT EDUCATION:       The following items were discussed with the patient:    - Good Communication Strategies    - Hearing Loss and Hearing Aids    Educational information was shared in the After Visit Summary. RECOMMENDATIONS:                                                                                                                                                                                                                                                          The following items are recommended based on patient report and results from today's appointment:   - Continue medical follow-up with [address] hearing as medically indicated and/or sooner if a change in hearing is noted. - If desired, schedule a Hearing Aid Evaluation (HAE) appointment to discuss hearing aid options. Recommended patient contact insurance to determine possible hearing aid benefit. - Utilize \"Good Communication Strategies\" as discussed to assist in speech understanding with communication partners. -  Patient's daughter inquired about Caption Call telephone services. Caption Call certification form was signed, faxed to [facility] Call, and scanned into media tab. Patient to received phone call from [facility] Call to set up phone device. Joao Wills  Audiologist    Chart CC'd to:  Deandra Stockton [place],        Degree of   Hearing Sensitivity dB Range   Within Normal Limits (WNL) 0 - 20   Mild 20 - 40   Moderate 40 - 55   Moderately-Severe 55 - 70   Severe 70 - 90   Profound 90 +

## 2020-10-20 ENCOUNTER — PROCEDURE VISIT (OUTPATIENT)
Dept: AUDIOLOGY | Age: 85
End: 2020-10-20
Payer: MEDICARE

## 2020-10-20 VITALS — TEMPERATURE: 97.1 F

## 2020-10-20 PROBLEM — H90.3 SENSORINEURAL HEARING LOSS, BILATERAL: Status: ACTIVE | Noted: 2020-10-20

## 2020-10-20 PROCEDURE — 92567 TYMPANOMETRY: CPT | Performed by: AUDIOLOGIST

## 2020-10-20 PROCEDURE — 92557 COMPREHENSIVE HEARING TEST: CPT | Performed by: AUDIOLOGIST

## 2020-10-20 NOTE — Clinical Note
Dr. Zhanna Garcia,    Thank you for your referral for audiometric testing on this patient. Today's results revealed a symmetric sensorineural hearing loss with excellent word recognition, bilaterally. Hearing loss significant enough to create hearing difficulty in most listening situations. Discussed benefits of amplification. Recommended patient contact insurance to determine possible hearing aid benefit. Patient's daughter inquired about Caption Call telephone services. Caption Call certification form was signed, faxed to [facility] Call, and scanned into media tab. Please see the scanned audiogram (under \"Media\" tab) and encounter note for details. If you have any questions, or if there is anything else you need, please let me know.       Joao Jimenez  Audiologist  ---  [facility] - [facility] - Audiology

## 2020-10-20 NOTE — PATIENT INSTRUCTIONS
Good Communication Strategies    Communication can be challenging for anyone, but can be especially difficult for those with some degree of hearing loss. While we may not be able to control every factor that may lead to difficulty with communication, there are Good Communication Strategies that we can all use in our day-to-day lives. Communication takes both parties working together for it to be successful. Tips as a Listener:   1. Control your environment. It is important to limit the amount of background noise in the room when possible. You should also consider having a good light source in the room to best see the other person. 2. Ask for clarification. Instead of saying \"What?\", you can use parts of what you heard to make a new question. For example, if you heard the word \"Thursday\" but not the rest of the week, you may ask \"What was that about Thursday? \" or \"What did you want to do Thursday? \". This shows the person talking that you are listening and will help them better explain what they are saying. 3. Be an advocate for yourself. If you are hearing but not understanding, tell the other person \"I can hear you, but I need you to slow down when you speak. \"  Or if someone is facing the other direction, say \"I cannot hear you when you are not looking at me when we talk. \"       Tips as a Talker:   - Sit or stand 3 to 6 feet away to maximize audibility         -- It is unrealistic to believe someone else will fully hear your message if you are speaking from across the room or in a different room in the house   - Stay at eye level to help with visual cues   - Make sure you have the persons attention before speaking   - Use facial expressions and gestures to accentuate your message   - Raise your voice slightly (do not scream)   - Speak slowly and distinctly   - Use short, simple sentences   - Rephrase your words if the person is having a hard time understanding you    - To avoid distortion, dont speak directly into a persons ear      Some additional items that may be helpful:   - Remain patient - this is important for both parties   - Write down items that still cannot be heard/understood. You may write with pen/paper or consider typing/texting on a cell phone or smart device. - If background noise is unavoidable, try to keep yourself in a good position in the room. By sitting at a mccall on the side of the restaurant (preferably a corner), it will be easier to communicate than if you were sitting at a table in the middle with background noise surrounding you. Keep yourself positioned away from music speakers or heavy foot traffic. Hearing Loss: Care Instructions  Your Care Instructions      Hearing loss is a sudden or slow decrease in how well you hear. It can range from mild to profound. Permanent hearing loss can occur with aging, and it can happen when you are exposed long-term to loud noise. Examples include listening to loud music, riding motorcycles, or being around other loud machines. Hearing loss can affect your work and home life. It can make you feel lonely or depressed. You may feel that you have lost your independence. But hearing aids and other devices can help you hear better and feel connected to others. Follow-up care is a key part of your treatment and safety. Be sure to make and go to all appointments, and call your doctor if you are having problems. It's also a good idea to know your test results and keep a list of the medicines you take. How can you care for yourself at home? · Avoid loud noises whenever possible. This helps keep your hearing from getting worse. Always wear hearing protection around loud noises. · If appropriate, wear hearing aid(s) as directed. It is recommended that hearing aids are worn during all waking hours to keep your brain active and give it access to the sounds it is missing.       · If you are beginning your process with hearing aid(s), schedule a \"Hearing Aid Evaluation\" with an audiologist to discuss your lifestyle, features of hearing aid technology, and styles of hearing aids available. It is recommended that you contact your insurance company to determine if you have a hearing aid benefit, as this may dictate who you can see for these services. · Have hearing tests as your doctor suggests. They can show whether your hearing has changed. Your hearing aid may need to be adjusted. · Use other assistive devices as needed. These may include:  ? Telephone amplifiers and hearing aids that can connect to a television, stereo, radio, or microphone. ? Devices that use lights or vibrations. These alert you to the doorbell, a ringing telephone, or a baby monitor. ? Television closed-captioning. This shows the words at the bottom of the screen. Most new TVs can do this. ? TTY (text telephone). This lets you type messages back and forth on the telephone instead of talking or listening. These devices are also called TDD. When messages are typed on the keyboard, they are sent over the phone line to a receiving TTY. The message is shown on a monitor. · Use pagers, fax machines, text, and email if it is hard for you to communicate by telephone. · Try to learn a listening technique called speech-reading. It is not lip-reading. You pay attention to people's gestures, expressions, posture, and tone of voice. These clues can help you understand what a person is saying. Face the person you are talking to, and have him or her face you. Make sure the lighting is good. You need to see the other person's face clearly. · Think about counseling if you need help to adjust to your hearing loss. When should you call for help? Watch closely for changes in your health, and be sure to contact your doctor if:    · You think your hearing is getting worse. · You have new symptoms, such as dizziness or nausea.

## 2020-10-23 NOTE — PROGRESS NOTES
Deya Mclaughlin  1935.85 y.o. female   [id number]      HEARING [address] EVALUATION    Deya Mclaughlin was seen today, 10/28/2020 , for a Hearing Aid Evaluation following audiologic evaluation on 10/20/2020. Deya Mclaughlin was accompanied by her daughter. Patient has no prior history of hearing aid use. Patient was found to have access to hearing aid discount through third party  ([address] through insurance. Explained this patient and daughter prior to appointment. Patient understands if she wishes to pursue hearing aids through [facility] ([facility]), she is responsible for cost of devices. Hearing aid benefit worksheet scanned into media tab. DATE: 10/28/2020     PROCEDURES:     SOUNDFIELD TESTING:     Name of test Type of amplification Level of signal Level of noise % Correct        Nu-6  None 55dBHL N/A 0%        Nu-6  None 65dBHL N/A 40%    QuickSIN None 70dBHL varied CNT       LIFESTYLE EVALUATION:      How do you spend most of your day? Patient states difficulty hearing general conversation even in quiet at the doctor or with family in person and on the phone. She also notes difficulty in background noise. Prior to [place] patient would attend activates at [facility] with . Which ear do you use on the phone? Left (Patient is in process of scheduling Caption Call phone set up)       Landline or cellphone  Both    Has anyone recommended you wear HAs before? No      If yes, have you tried HAs? If no, why not? Do you feel like your hearing loss limits or hampers your personal or social life in any way? Yes stopping calling sister with difficulty on the phone    How do you compensate for things you miss or misunderstand? Asks to repeat    Does a hearing problem cause you to feel frustrated when talking to members of your family? Yes ( has Alzheimer's)      Do they get frustrated with you? Yes     How does it make you feel when you miss things?   Embarrassed Does your hearing problem cause you difficulty when visiting friends, relatives, or neighbors? Yes     Does your hearing problem cause you to have arguments with family members? Yes    Provide a guesstimate of the % of the service or meeting that you hear and understand clearly  %    Does your hearing problem cause you difficulty when listening to a TV or radio? Yes    Do others feel that your TV/radio is too loud? Yes    Does a hearing problem cause you difficulty when in a restaurant with relatives or friends? Yes    What % of conversation do you feel that you hear in groups/restaurants? Depends on volume of background noise, but when a lot of noise <50$    When is your hearing loss most problematic? In background noise or groups settings. She states if she is asked a question has a hard time answering especially if she doesn't hear it. In what situation would you most like to hear better? 1. General conversation   2. Phone    After a discussion of evaluation results, discussion of levels of technology and prices, and lifestyle assessment, Deya Mclaughlin has decided to consider the options and contact Audiology when a decision has been made. .     Technology to be considered: [employer]on [employer] (technology level TBD). Picked color 44,  power 2(85), 6mm double bas [facility] . Patient cost due at time of fitting: $TBD; dependent on level of technology selected. Patient is between basic and economy technology quoted at $3,000.00 and $2,100.00 respectively. PATIENT EDUCATION:      - Verbally reviewed benefits and limitations of devices and available features in hearing aids. - Verbally discussed realistic expectations of hearing aid use     Information was shared verbally with patient during appointment. RECOMMENDATIONS:      - Contact Audiology when a decision has been made to pursue hearing aids. No charge for today's appointment.     Virgen Salinas, AuD  Audiologist

## 2020-10-28 ENCOUNTER — PROCEDURE VISIT (OUTPATIENT)
Dept: AUDIOLOGY | Age: 85
End: 2020-10-28

## 2020-10-28 VITALS — TEMPERATURE: 97.3 F

## 2020-10-28 PROCEDURE — 99999 PR OFFICE/OUTPT VISIT,PROCEDURE ONLY: CPT | Performed by: AUDIOLOGIST

## 2020-10-30 ENCOUNTER — TELEPHONE (OUTPATIENT)
Dept: AUDIOLOGY | Age: 85
End: 2020-10-30

## 2020-10-30 NOTE — TELEPHONE ENCOUNTER
Patient's daughter called Dr. Wisam Stanton back to let her know that they want to proceed with getting Hearing aids at the basic level. I informed her that Dr. Wisam Stanton will place the order and then she will come in for the fitting once we have received the Hearing aids in office.

## 2020-11-06 NOTE — PROGRESS NOTES
Lilian Oneill   1935, 82 y.o. female   [id number]     HEARING AID FITTING      RIGHT EAR: /MODEL: Oticon Ruby1 miniRITE-R  SN: [id number]  EARMOLD/TUBING/WIRE/DOME: 2(85) with 6mm double bass domes    LEFT EAR: /MODEL: Oticon Ruby1 miniRITE-R  SN: [id number]  EARMOLD/TUBING/WIRE/DOME: 2(85) with 6mm double bass domes    HAF: 11/10/2020  WARRANTY: 12/3/2023  30 DAY RIGHT-TO-RETURN PERIOD: 12/10/2020    BUTTONS: Disabled  PROGRAMS: P1= All-Around      Lilian Oneill was seen today, 11/10/2020   to be fit with Shannon  miniRITE-R hearing aids. Patient fit with 2(85) receivers and 6mm double bass domes which appear to be a good fit. Played sound optimization clips to complete the personal profile questions. Programmed to acclimatization level 33. Counseled patient on use and care of devices and on realistic expectations. Device orientation was completed, includin. Hearing aid insertion/removal   2. Buttons/Indicators   3. How to charge devices     4. Cleaning/maintenance of the device     5. Loss & Damage/Repair warranty information   6. 30-day right-to-return period    Lilian Oneill noted good sound quality and comfort with hearing aids. Patient demonstrated proper insertion and removal of devices in office. Completed delivery statement and reviewed 30 day trial period and recall process for programming adjustments. PATIENT EDUCATION:       Lilian Oneill was provided with the Hearing Aid Fitting Checklist as a reference of items discussed at today's appointment. Patient may find additional product information in the provided hearing aid  device manual.    Information was shared verbally with patient during appointment. RECOMMENDATIONS:     - Return for follow-up appointment within 30-day right-to-return period.  - Continue wearing both HAs during all waking hours.   - Retest hearing as medically indicated and/or sooner if a change in hearing is noted. - Contact audiologist with questions/concerns as needed. Patient paid total cost of $3,000.00.     Unbundled Billing- see associated fees and codes below:        Joao Peterson  Audiologist

## 2020-11-09 NOTE — TELEPHONE ENCOUNTER
.  Last office visit 3/5/2020     Last written 3-5-2020 90 with 1      Next office visit scheduled 1/19/2021    Requested Prescriptions     Pending Prescriptions Disp Refills    lisinopril (PRINIVIL;ZESTRIL) 20 MG tablet 90 tablet 1     Sig: TAKE 1 TABLET BY MOUTH ONCE DAILY

## 2020-11-10 ENCOUNTER — PROCEDURE VISIT (OUTPATIENT)
Dept: AUDIOLOGY | Age: 85
End: 2020-11-10
Payer: MEDICARE

## 2020-11-10 PROCEDURE — V5265 EAR MOLD/INSERT, DISP: HCPCS | Performed by: AUDIOLOGIST

## 2020-11-10 PROCEDURE — V5011 HEARING AID FITTING/CHECKING: HCPCS | Performed by: AUDIOLOGIST

## 2020-11-10 PROCEDURE — V5261 HEARING AID, DIGIT, BIN, BTE: HCPCS | Performed by: AUDIOLOGIST

## 2020-11-10 PROCEDURE — V5020 CONFORMITY EVALUATION: HCPCS | Performed by: AUDIOLOGIST

## 2020-11-10 PROCEDURE — V5160 DISPENSING FEE BINAURAL: HCPCS | Performed by: AUDIOLOGIST

## 2020-11-10 RX ORDER — LISINOPRIL 20 MG/1
TABLET ORAL
Qty: 90 TABLET | Refills: 1 | Status: SHIPPED | OUTPATIENT
Start: 2020-11-10 | End: 2021-06-08

## 2020-11-17 ENCOUNTER — TELEPHONE (OUTPATIENT)
Dept: ENT CLINIC | Age: 85
End: 2020-11-17

## 2020-11-17 NOTE — TELEPHONE ENCOUNTER
She is a patient of Dr. Wisam Stanton at [facility]. Directing conversation to her.       [facility] [place], [place]  Audiologist

## 2020-11-22 NOTE — PROGRESS NOTES
Hesham Munoz   1935, 82 y.o. female   [id number]     HEARING [address]    Hesham Munoz was seen today, 11/25/2020,  for first follow-up after being fit with Cale Carver miniRITE-R hearing aids. PROCEDURES:     Patient reports loss of left hearing aid (SN [id number]). Prior to loss of aid, patient noted overall satisfaction and benefit with aids. Reviewed loss and damage policy with patient and her daughter. Patient signed loss and damage for. Patient understands she is responsible for $350.00 for one-time courtesy replacement for left aid. Will call patient when replacement aid arrives in office. Extended trial period to 12/29/2020. Recommended patient use \"tie\" masks to prevent loss of aid when taking off the mask. Patient reported understanding and left with no further questions. Patient Education:      -Reviewed importance of proper insertion for sound quality and retention  -Discussed ways of taking off masks to protect hearing aids including having wife take off mask for him and/or immediately checking to confirm aids are still in place after he takes off mask. Information was shared verbally with patient during appointment. RECOMMENDATIONS:     1. Continue wearing right HA during all waking hours. 2. Return for follow-up when replacement aid arrives in office. 3. Retest hearing as medically indicated and/or sooner if a change in hearing is noted. No charge for today's appointment.     Joao Negron  Audiologist

## 2020-11-23 ENCOUNTER — TELEPHONE (OUTPATIENT)
Dept: FAMILY MEDICINE CLINIC | Age: 85
End: 2020-11-23

## 2020-11-23 ENCOUNTER — OFFICE VISIT (OUTPATIENT)
Dept: FAMILY MEDICINE CLINIC | Age: 85
End: 2020-11-23
Payer: MEDICARE

## 2020-11-23 VITALS
DIASTOLIC BLOOD PRESSURE: 60 MMHG | SYSTOLIC BLOOD PRESSURE: 110 MMHG | HEART RATE: 86 BPM | TEMPERATURE: 98.6 F | BODY MASS INDEX: 22.32 KG/M2 | OXYGEN SATURATION: 99 % | WEIGHT: 128 LBS

## 2020-11-23 DIAGNOSIS — N18.32 STAGE 3B CHRONIC KIDNEY DISEASE (HCC): ICD-10-CM

## 2020-11-23 DIAGNOSIS — N18.32 ANEMIA DUE TO STAGE 3B CHRONIC KIDNEY DISEASE (HCC): ICD-10-CM

## 2020-11-23 DIAGNOSIS — R53.83 FATIGUE, UNSPECIFIED TYPE: ICD-10-CM

## 2020-11-23 DIAGNOSIS — D63.1 ANEMIA DUE TO STAGE 3B CHRONIC KIDNEY DISEASE (HCC): ICD-10-CM

## 2020-11-23 DIAGNOSIS — R73.03 PRE-DIABETES: ICD-10-CM

## 2020-11-23 PROCEDURE — 99214 OFFICE O/P EST MOD 30 MIN: CPT | Performed by: STUDENT IN AN ORGANIZED HEALTH CARE EDUCATION/TRAINING PROGRAM

## 2020-11-23 RX ORDER — HYDROXYZINE HYDROCHLORIDE 25 MG/1
25 TABLET, FILM COATED ORAL DAILY PRN
Qty: 30 TABLET | Refills: 0 | Status: SHIPPED | OUTPATIENT
Start: 2020-11-23 | End: 2021-02-09

## 2020-11-23 ASSESSMENT — ENCOUNTER SYMPTOMS
SHORTNESS OF BREATH: 0
VOMITING: 0
CONSTIPATION: 0
NAUSEA: 0
DIARRHEA: 0
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
## 2020-11-24 LAB
A/G RATIO: 1.8 (ref 1.1–2.2)
ALBUMIN SERPL-MCNC: 4.6 G/DL (ref 3.4–5)
ALP BLD-CCNC: 49 U/L (ref 40–129)
ALT SERPL-CCNC: 10 U/L (ref 10–40)
ANION GAP SERPL CALCULATED.3IONS-SCNC: 11 MMOL/L (ref 3–16)
AST SERPL-CCNC: 16 U/L (ref 15–37)
BASOPHILS ABSOLUTE: 0 K/UL (ref 0–0.2)
BASOPHILS RELATIVE PERCENT: 0.4 %
BILIRUB SERPL-MCNC: <0.2 MG/DL (ref 0–1)
BUN BLDV-MCNC: 35 MG/DL (ref 7–20)
CALCIUM SERPL-MCNC: 10 MG/DL (ref 8.3–10.6)
CHLORIDE BLD-SCNC: 102 MMOL/L (ref 99–110)
CO2: 26 MMOL/L (ref 21–32)
CREAT SERPL-MCNC: 1.4 MG/DL (ref 0.6–1.2)
CREATININE URINE: 195.9 MG/DL (ref 28–259)
EOSINOPHILS ABSOLUTE: 0.1 K/UL (ref 0–0.6)
EOSINOPHILS RELATIVE PERCENT: 2 %
ESTIMATED AVERAGE GLUCOSE: 125.5 MG/DL
GFR AFRICAN AMERICAN: 43
GFR NON-AFRICAN AMERICAN: 36
GLOBULIN: 2.6 G/DL
GLUCOSE BLD-MCNC: 98 MG/DL (ref 70–99)
HBA1C MFR BLD: 6 %
HCT VFR BLD CALC: 33.3 % (ref 36–48)
HEMOGLOBIN: 11 G/DL (ref 12–16)
LYMPHOCYTES ABSOLUTE: 1.7 K/UL (ref 1–5.1)
LYMPHOCYTES RELATIVE PERCENT: 28.7 %
MCH RBC QN AUTO: 30.5 PG (ref 26–34)
MCHC RBC AUTO-ENTMCNC: 32.9 G/DL (ref 31–36)
MCV RBC AUTO: 92.7 FL (ref 80–100)
MICROALBUMIN UR-MCNC: <1.2 MG/DL
MICROALBUMIN/CREAT UR-RTO: NORMAL MG/G (ref 0–30)
MONOCYTES ABSOLUTE: 0.4 K/UL (ref 0–1.3)
MONOCYTES RELATIVE PERCENT: 6.8 %
NEUTROPHILS ABSOLUTE: 3.6 K/UL (ref 1.7–7.7)
NEUTROPHILS RELATIVE PERCENT: 62.1 %
PARATHYROID HORMONE INTACT: 48.5 PG/ML (ref 14–72)
PDW BLD-RTO: 14.5 % (ref 12.4–15.4)
PLATELET # BLD: 199 K/UL (ref 135–450)
PMV BLD AUTO: 9.2 FL (ref 5–10.5)
POTASSIUM SERPL-SCNC: 5.2 MMOL/L (ref 3.5–5.1)
RBC # BLD: 3.59 M/UL (ref 4–5.2)
SODIUM BLD-SCNC: 139 MMOL/L (ref 136–145)
TOTAL PROTEIN: 7.2 G/DL (ref 6.4–8.2)
TSH REFLEX: 1.92 UIU/ML (ref 0.27–4.2)
WBC # BLD: 5.8 K/UL (ref 4–11)

## 2020-11-25 ENCOUNTER — PROCEDURE VISIT (OUTPATIENT)
Dept: AUDIOLOGY | Age: 85
End: 2020-11-25

## 2020-11-25 VITALS — TEMPERATURE: 97 F

## 2020-11-25 PROCEDURE — 99999 PR OFFICE/OUTPT VISIT,PROCEDURE ONLY: CPT | Performed by: AUDIOLOGIST

## 2020-12-07 ENCOUNTER — VIRTUAL VISIT (OUTPATIENT)
Dept: PSYCHOLOGY | Age: 85
End: 2020-12-07
Payer: MEDICARE

## 2020-12-07 PROCEDURE — 98968 PH1 ASSMT&MGMT NQHP 21-30: CPT | Performed by: PSYCHOLOGIST
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
## 2020-12-13 NOTE — PROGRESS NOTES
Ayden Lopez  1935.85 y.o. female   [id number]    HEARING [address] [facility]    Ayden Lopez was seen today, 12/18/2020, for a hearing aid  appointment. PROCEDURES:     RIGHT EAR: /MODEL: Oticon Ruby1 miniRITE-R  SN: [id number]  EARMOLD/TUBING/WIRE/DOME: 2(85) with 6mm double bass domes    LEFT EAR: /MODEL: Oticon Ruby1 miniRITE-R  SN: [id number]  EARMOLD/TUBING/WIRE/DOME: 2(85) with 6mm double bass domes    HAF: 11/10/2020  WARRANTY: 12/3/2023     BUTTONS: Disabled  PROGRAMS: P1= All-Around    Otoscopy revealed: Clear ear canals bilaterally    Patient was seen for  of left replacement hearing aid. Left replacement aid (SN: [id number]) reprogrammed to patient user settings. Following programming, patient reported comfort and good sound quality with devices. Patient demonstrated she was able to inset aids properly in office. Counseled patient on proper insertion to assist with keeping aids in place. Patient now has tie mask to keep mask off ear for better retention. PATIENT EDUCATION:     -Reviewed importance of proper insertion for sound quality and retention  -Discussed ways of taking off masks to protect hearing aids including having wife take off mask for him and/or immediately checking to confirm aids are still in place after he takes off mask. Information was shared verbally with patient during appointment. RECOMMENDATIONS:     - Continue consistent hearing aid use  - Return for hearing aid checks as needed  - Retest hearing as medically indicated and/or sooner if a change in hearing is noted. - Contact audiologist with questions/concerns as needed.      **Patient paid $350.00 for deductible for one-time courtesy replacement for left aid      Joao Espino  Audiologist

## 2020-12-18 ENCOUNTER — PROCEDURE VISIT (OUTPATIENT)
Dept: AUDIOLOGY | Age: 85
End: 2020-12-18
Payer: MEDICARE

## 2020-12-18 PROCEDURE — V5299 HEARING SERVICE: HCPCS | Performed by: AUDIOLOGIST

## 2021-01-04 DIAGNOSIS — M54.50 CHRONIC RIGHT-SIDED LOW BACK PAIN, UNSPECIFIED WHETHER SCIATICA PRESENT: ICD-10-CM

## 2021-01-04 DIAGNOSIS — M15.9 PRIMARY OSTEOARTHRITIS INVOLVING MULTIPLE JOINTS: ICD-10-CM

## 2021-01-04 DIAGNOSIS — G89.29 CHRONIC RIGHT-SIDED LOW BACK PAIN, UNSPECIFIED WHETHER SCIATICA PRESENT: ICD-10-CM

## 2021-01-04 RX ORDER — TIZANIDINE 2 MG/1
TABLET ORAL
Qty: 30 TABLET | Refills: 0 | Status: SHIPPED | OUTPATIENT
Start: 2021-01-04 | End: 2022-03-10 | Stop reason: SDUPTHER

## 2021-01-04 NOTE — TELEPHONE ENCOUNTER
Last office visit 11/23/2020     Last written 7- only takes it if absolutely necessary    Next office visit scheduled 2/15/2021    Requested Prescriptions     Pending Prescriptions Disp Refills    HYDROcodone-acetaminophen (NORCO) 5-325 MG per tablet 28 tablet 0     Sig: Take 1 tablet by mouth every 6 hours as needed for Pain for up to 7 days.

## 2021-01-04 NOTE — TELEPHONE ENCOUNTER
.  Last office visit 11/23/2020     Last written 8- 30 with 0      Next office visit scheduled 2/15/2021    Requested Prescriptions     Pending Prescriptions Disp Refills    tiZANidine (ZANAFLEX) 2 MG tablet [Pharmacy Med Name: tiZANidine HCl 2 MG Oral Tablet] 30 tablet 0     Sig: TAKE 1 TABLET BY MOUTH NIGHTLY AS NEEDED FOR MUSCLE SPASM

## 2021-01-04 NOTE — PROGRESS NOTES
Tristan Chavis   1935, 82 y.o. female   [id number]     HEARING [address]    Tristan Chavis was seen today, 1/7/2021,  for follow-up after being fit with Taina Mock miniRITE-R hearing aids. PROCEDURES:     Patient reported overall comfort and satisfaction with devices; however notes some difficulty inserting aids. Otoscopy revealed clear canals, AU. Live speech mapping (LSM) was completed. LSM revealed low frequency gain under target. Changed to 8mm double bass dome from 6mm double bass dome. After changes with new dome LSM showed good match to NAL-NL2 targets from 250-8000 Hz, AU. After adjustments patient reported comfort and good sound quality. Discussed volume control with patient per daughter request. Patient and daughter decided to leave volume control disabled on devices. Patient demonstrated she was able to insert aids with new dome in office. Reviewed cleaning and maintenance of hearing aids. Patient demonstrated she was able to change wax guards and domes in office. Soundfield Testing  Name of test Type of amplification Level of signal Level of noise % Correct % Correct at HAE      Nu-6  Bilateral HAs 55dBHL N/A 60% 0%      Nu-6  Bilateral HAs 65dBHL N/A 72% 40%      QuickSIN Bilateral HAs 70dBHL varied CNT CNT   SFT results show patient is receiving good benefit from hearing aids and is meeting amplification goals. Patient reported understanding of soundfield results and left with no further questions. Patient Education:       - Verbally and visually reviewed care and maintenance of devices. - Reviewed Hearing Aid Fitting checklist and Troubleshooting document given to patient. Information was shared verbally with patient during appointment. RECOMMENDATIONS:     1. Continue wearing both HAs during all waking hours.   2. Retest hearing as medically indicated and/or sooner if a change in hearing is noted. 3. HAC every 3-4 months as scheduled. 4. Contact audiologist with questions/concerns as needed.      No charge for today's appointment     Joao Melendez  Audiologist

## 2021-01-05 RX ORDER — HYDROCODONE BITARTRATE AND ACETAMINOPHEN 5; 325 MG/1; MG/1
1 TABLET ORAL EVERY 6 HOURS PRN
Qty: 28 TABLET | Refills: 0 | OUTPATIENT
Start: 2021-01-05 | End: 2021-01-12

## 2021-01-07 ENCOUNTER — PROCEDURE VISIT (OUTPATIENT)
Dept: AUDIOLOGY | Age: 86
End: 2021-01-07

## 2021-01-07 VITALS — TEMPERATURE: 97 F

## 2021-01-07 DIAGNOSIS — H90.3 SENSORINEURAL HEARING LOSS, BILATERAL: Primary | ICD-10-CM

## 2021-01-07 PROCEDURE — 99999 PR OFFICE/OUTPT VISIT,PROCEDURE ONLY: CPT | Performed by: AUDIOLOGIST

## 2021-01-21 ENCOUNTER — TELEPHONE (OUTPATIENT)
Dept: FAMILY MEDICINE CLINIC | Age: 86
End: 2021-01-21

## 2021-01-21 NOTE — TELEPHONE ENCOUNTER
Pt called to see if Dr. Nicole Tanner would be able to give her a stronger dose of Hydroxyzine 25mg because pt feels like its not working and is still a nervous wreck all the time.  Please Advise

## 2021-01-21 NOTE — TELEPHONE ENCOUNTER
Please let patient know that she can try taking two 25mg tablets at the same time to see if this is more beneficial. Please warn her that this can increase drowsiness. I would also recommend that she schedule follow-up with Suzanna Hines who she has seen previously and was due to follow-up with earlier this month.

## 2021-01-27 ENCOUNTER — HOSPITAL ENCOUNTER (OUTPATIENT)
Dept: ULTRASOUND IMAGING | Age: 86
Discharge: HOME | End: 2021-01-27
Payer: MEDICARE

## 2021-01-27 DIAGNOSIS — N18.30 STAGE 3 CHRONIC KIDNEY DISEASE, UNSPECIFIED WHETHER STAGE 3A OR 3B CKD (HCC): ICD-10-CM

## 2021-01-27 PROCEDURE — 76770 US EXAM ABDO BACK WALL COMP: CPT

## 2021-02-08 ENCOUNTER — TELEPHONE (OUTPATIENT)
Dept: FAMILY MEDICINE CLINIC | Age: 86
End: 2021-02-08

## 2021-02-08 NOTE — TELEPHONE ENCOUNTER
Please ask how many BMs she is having daily? Any blood or mucous? Any sick contacts? Recent travel? Some formed stool or only liquid? If mild Diarrhea 3BM or less with negative answers to rest of questions and improving, can use immodium PRN and schedule VV if not completely resolved in next few days.

## 2021-02-08 NOTE — TELEPHONE ENCOUNTER
Talked to daughter she says that there is some stool and no to all questions asked. She did not inform me of regularity. She was informed of instructions for PRN Imodium and to schedule a vv appointment if it does not resolve in the next few days.

## 2021-02-09 DIAGNOSIS — Z63.6 CAREGIVER STRESS: ICD-10-CM

## 2021-02-09 RX ORDER — HYDROXYZINE HYDROCHLORIDE 25 MG/1
TABLET, FILM COATED ORAL
Qty: 30 TABLET | Refills: 0 | Status: SHIPPED | OUTPATIENT
Start: 2021-02-09 | End: 2021-05-18

## 2021-02-09 SDOH — SOCIAL STABILITY - SOCIAL INSECURITY: DEPENDENT RELATIVE NEEDING CARE AT HOME: Z63.6

## 2021-02-09 NOTE — TELEPHONE ENCOUNTER
Refill Request     Last Seen: 11/23/2020    Last Written:  11/23/2020 #30 with 0 refills    Next Appointment:   Future Appointments   Date Time Provider Alejandra Castillo   2/15/2021  3:15 PM Hany Brandon DO [facility]   3/12/2021 10:00 AM SCHEDULE, TON[facility] AWDEE DEE SAUCEDA [facility]   4/7/2021  3:00 PM Krista Irvin, AuD AND AUDIO [facility]       Future appointment scheduled      Requested Prescriptions     Pending Prescriptions Disp Refills    hydrOXYzine (ATARAX) 25 MG tablet [Pharmacy Med Name: hydrOXYzine HCl 25 MG Oral Tablet] 30 tablet 0     Sig: TAKE 1 TABLET BY MOUTH ONCE DAILY AS NEEDED FOR ANXIETY

## 2021-02-15 ENCOUNTER — HOSPITAL ENCOUNTER (OUTPATIENT)
Age: 86
Discharge: HOME | End: 2021-02-15
Payer: MEDICARE

## 2021-02-15 LAB
ALBUMIN SERPL-MCNC: 4.1 G/DL (ref 3.4–5)
ANION GAP SERPL CALCULATED.3IONS-SCNC: 8 MMOL/L (ref 3–16)
BILIRUBIN URINE: NEGATIVE
BLOOD, URINE: NEGATIVE
BUN BLDV-MCNC: 15 MG/DL (ref 7–20)
CALCIUM SERPL-MCNC: 9.4 MG/DL (ref 8.3–10.6)
CHLORIDE BLD-SCNC: 106 MMOL/L (ref 99–110)
CLARITY: CLEAR
CO2: 27 MMOL/L (ref 21–32)
COLOR: YELLOW
CREAT SERPL-MCNC: 1.1 MG/DL (ref 0.6–1.2)
GFR AFRICAN AMERICAN: 57
GFR NON-AFRICAN AMERICAN: 47
GLUCOSE BLD-MCNC: 96 MG/DL (ref 70–99)
GLUCOSE URINE: NEGATIVE MG/DL
HCT VFR BLD CALC: 31.4 % (ref 36–48)
HEMOGLOBIN: 10.4 G/DL (ref 12–16)
KETONES, URINE: NEGATIVE MG/DL
LEUKOCYTE ESTERASE, URINE: NEGATIVE
MCH RBC QN AUTO: 30.5 PG (ref 26–34)
MCHC RBC AUTO-ENTMCNC: 33 G/DL (ref 31–36)
MCV RBC AUTO: 92.3 FL (ref 80–100)
MICROSCOPIC EXAMINATION: NORMAL
NITRITE, URINE: NEGATIVE
PDW BLD-RTO: 14.5 % (ref 12.4–15.4)
PH UA: 7.5 (ref 5–8)
PHOSPHORUS: 2.7 MG/DL (ref 2.5–4.9)
PLATELET # BLD: 184 K/UL (ref 135–450)
PMV BLD AUTO: 8.6 FL (ref 5–10.5)
POTASSIUM SERPL-SCNC: 4.2 MMOL/L (ref 3.5–5.1)
PROTEIN UA: NEGATIVE MG/DL
RBC # BLD: 3.41 M/UL (ref 4–5.2)
SODIUM BLD-SCNC: 141 MMOL/L (ref 136–145)
SPECIFIC GRAVITY UA: 1.02 (ref 1–1.03)
URINE TYPE: NORMAL
UROBILINOGEN, URINE: 0.2 E.U./DL
VITAMIN D 25-HYDROXY: 51.4 NG/ML
WBC # BLD: 6.4 K/UL (ref 4–11)

## 2021-02-15 PROCEDURE — 36415 COLL VENOUS BLD VENIPUNCTURE: CPT

## 2021-02-15 PROCEDURE — 82306 VITAMIN D 25 HYDROXY: CPT

## 2021-02-15 PROCEDURE — 81003 URINALYSIS AUTO W/O SCOPE: CPT

## 2021-02-15 PROCEDURE — 80069 RENAL FUNCTION PANEL: CPT

## 2021-02-15 PROCEDURE — 85027 COMPLETE CBC AUTOMATED: CPT

## 2021-02-16 RX ORDER — ATORVASTATIN CALCIUM 10 MG/1
TABLET, FILM COATED ORAL
Qty: 90 TABLET | Refills: 0 | Status: SHIPPED | OUTPATIENT
Start: 2021-02-16 | End: 2021-05-11

## 2021-02-16 NOTE — TELEPHONE ENCOUNTER
.  Last office visit 11/23/2020     Last written 3-5-2020 90 with 1      Next office visit scheduled 3/12/2021    Requested Prescriptions     Pending Prescriptions Disp Refills    atorvastatin (LIPITOR) 10 MG tablet [Pharmacy Med Name: Atorvastatin Calcium 10 MG Oral Tablet] 90 tablet 0     Sig: Take 1 tablet by mouth once daily

## 2021-03-08 ENCOUNTER — VIRTUAL VISIT (OUTPATIENT)
Dept: FAMILY MEDICINE CLINIC | Age: 86
End: 2021-03-08
Payer: MEDICARE

## 2021-03-08 VITALS
DIASTOLIC BLOOD PRESSURE: 80 MMHG | SYSTOLIC BLOOD PRESSURE: 120 MMHG | WEIGHT: 128 LBS | BODY MASS INDEX: 22.68 KG/M2 | TEMPERATURE: 98 F | HEIGHT: 63 IN

## 2021-03-08 DIAGNOSIS — Z00.00 ROUTINE GENERAL MEDICAL EXAMINATION AT A HEALTH CARE FACILITY: Primary | ICD-10-CM

## 2021-03-08 PROCEDURE — G0439 PPPS, SUBSEQ VISIT: HCPCS | Performed by: STUDENT IN AN ORGANIZED HEALTH CARE EDUCATION/TRAINING PROGRAM

## 2021-03-08 ASSESSMENT — PATIENT HEALTH QUESTIONNAIRE - PHQ9
SUM OF ALL RESPONSES TO PHQ QUESTIONS 1-9: 2
1. LITTLE INTEREST OR PLEASURE IN DOING THINGS: 0
SUM OF ALL RESPONSES TO PHQ9 QUESTIONS 1 & 2: 2
SUM OF ALL RESPONSES TO PHQ QUESTIONS 1-9: 2
2. FEELING DOWN, DEPRESSED OR HOPELESS: 2
SUM OF ALL RESPONSES TO PHQ QUESTIONS 1-9: 2

## 2021-03-08 NOTE — PATIENT INSTRUCTIONS
you well. In some cases, a  makes the decisions. When you name a health care agent, it is very clear who has the power to make health decisions for you. How do you name a health care agent? You name your health care agent on a legal form. This form is usually called a medical power of . Ask your hospital, state bar association, or office on aging where to find these forms. You must sign the form to make it legal. Some states require you to get the form notarized. This means that a person called a  watches you sign the form and then he or she signs the form. Some states also require that two or more witnesses sign the form. Be sure to tell your family members and doctors who your health care agent is. Where can you learn more? Go to [email or web address] and sign in to your [email or web address] account. Enter [id number] in the [employer] box to learn more about \"Learning About [address] \"     If you do not have an account, please click on the \"Sign Up Now\" link. Current as of: December 9, 2019               Content Version: 12.6  © 9258-5407 [employer]. Care instructions adapted under license by [facility] ([facility]). If you have questions about a medical condition or this instruction, always ask your healthcare professional. [place] [address] any warranty or liability for your use of this information.     ·

## 2021-03-08 NOTE — PROGRESS NOTES
Medicare Annual Wellness Visit  Name: Dylan Fay Date: 3/8/2021   MRN: [id number] Sex: Female   Age: 82 y.o. Ethnicity: Non-/Non    : 1935 Race: Eva Looney is here for Medicare AWV    Screenings for behavioral, psychosocial and functional/safety risks, and cognitive dysfunction are all negative except as indicated below. These results, as well as other patient data from the [address] form, are documented in Flowsheets linked to this Encounter. Allergies   Allergen Reactions    Codeine Hives    Toprol Xl [Metoprolol]        Prior to Visit Medications    Medication Sig Taking? Authorizing Provider   atorvastatin (LIPITOR) 10 MG tablet Take 1 tablet by mouth once daily  Bossman Brandon DO   hydrOXYzine (ATARAX) 25 MG tablet TAKE 1 TABLET BY MOUTH ONCE DAILY AS NEEDED FOR ANXIETY  Bossman Brandon DO   tiZANidine (ZANAFLEX) 2 MG tablet TAKE 1 TABLET BY MOUTH NIGHTLY AS NEEDED FOR MUSCLE SPASM  Bossman Brandon DO   lisinopril (PRINIVIL;ZESTRIL) 20 MG tablet TAKE 1 TABLET BY MOUTH ONCE DAILY  Jorden Malik,    diclofenac sodium 1 % GEL Apply 4 g topically 4 times daily  Catrachito Rodriguez MD   Cholecalciferol (VITAMIN D) 2000 units CAPS capsule Take by mouth  Historical Provider, MD   Calcium Carbonate-Vit D-Min (CALCIUM 1200 PO) Take by mouth  Historical Provider, MD   aspirin 81 MG tablet Take 81 mg by mouth daily.   Historical Provider, MD       Past Medical History:   Diagnosis Date    Arthritis     Chicken pox     Chills     CKD (chronic kidney disease) stage 3, GFR 30-59 ml/min ([facility]) 1/10/2019    Fever     Hip pain     Hyperlipidemia     Hypertension     Measles     Miscarriage     Osteoarthritis     Pneumonia     Poor circulation     Ringing in ears     Sacroiliitis ([address] 4/15/2016    Sensorineural hearing loss, bilateral 10/20/2020    Swelling of both ankles     Tonsillitis        Past Surgical History:   Procedure Laterality Date    COLONOSCOPY  11/09/2016    normal/ hemmoroids    SHOULDER SURGERY         Family History   Problem Relation Age of Onset    Heart Disease Mother     Stroke Mother     High Blood Pressure Mother     Arthritis Mother     High Blood Pressure Brother        CareTeam (Including outside providers/suppliers regularly involved in providing care):   Patient Care Team:  Dayo Ford DO as PCP - General (Family Medicine)  Dayo Ford DO as PCP - [facility] Empaneled Provider    Wt Readings from Last 3 Encounters:   03/08/21 128 lb (58.1 kg)   11/23/20 128 lb (58.1 kg)   03/05/20 133 lb 12.8 oz (60.7 kg)     Vitals:    03/08/21 1130   BP: 120/80   Temp: 98 °F (36.7 °C)   Weight: 128 lb (58.1 kg)   Height: 5' 3\" (1.6 m)     Body mass index is 22.67 kg/m². Patient reports vitals    Based upon direct observation of the patient, evaluation of cognition reveals recent and remote memory intact. Patient's complete Health Risk Assessment and screening values have been reviewed and are found in Flowsheets. The following problems were reviewed today and where indicated follow up appointments were made and/or referrals ordered.     Positive Risk Factor Screenings with Interventions:           Health Habits/Nutrition:  Health Habits/Nutrition  Do you exercise for at least 20 minutes 2-3 times per week?: (!) No  Have you lost any weight without trying in the past 3 months?: No  Do you eat only one meal per day?: No  Have you seen the dentist within the past year?: (!) No  Body mass index: 22.67  Health Habits/Nutrition Interventions:  · Inadequate physical activity:  educational materials provided to promote increased physical activity       Personalized Preventive Plan   Current Health Maintenance Status  Immunization History   Administered Date(s) Administered    Influenza Vaccine, unspecified formulation 10/05/2015, 11/26/2016    Influenza, High Dose (Fluzone 65 yrs and older) 10/11/2018    Influenza, Quadv, IM, (6 mo and older Fluzone, Flulaval, Fluarix and 3 yrs and older Afluria) 10/05/2017    Influenza, Quadv, adjuvanted, 65 yrs +, IM, PF (Fluad) 10/01/2020    Influenza, Triv, inactivated, subunit, adjuvanted, IM (Fluad 65 yrs and older) 10/15/2019    Pneumococcal Conjugate 13-valent ([id number]) 01/08/2016    Pneumococcal Polysaccharide ([id number]) 01/05/2018        Health Maintenance   Topic Date Due    COVID-19 Vaccine (1 of 2) Never done    DTaP/Tdap/Td vaccine (1 - Tdap) Never done    Shingles Vaccine (1 of 2) Never done    Breast cancer screen  10/12/2012    Annual Wellness Visit (AWV)  Never done    Lipid screen  10/17/2020    Potassium monitoring  02/15/2022    Creatinine monitoring  02/15/2022    DEXA (modify frequency per FRAX score)  Completed    Flu vaccine  Completed    Pneumococcal 65+ years Vaccine  Completed    Hepatitis A vaccine  Aged Out    Hepatitis B vaccine  Aged Out    Hib vaccine  Aged Out    Meningococcal (ACWY) vaccine  Aged Out     Recommendations for [employer] Info Due: see orders and patient instructions/AVS.  . Recommended screening schedule for the next 5-10 years is provided to the patient in written form: see Patient Instructions/AVS.    Fahad DENG LPN, 1/4/2027, performed the documented evaluation under the direct supervision of the attending physician. Chalino Nogueira, was evaluated through a synchronous (real-time) audio-video encounter. The patient (or guardian if applicable) is aware that this is a billable service. Verbal consent to proceed has been obtained within the past 12 months. The visit was conducted pursuant to the emergency declaration under the [facility], [address] [place] [address] authority and the [employer] General Act. Patient identification was verified, and a caregiver was present when appropriate.  The patient was located in a state where the provider was credentialed to provide care. Total time spent for this encounter: Not billed by time     Location [facility]    --Dean Toribio LPN on 0/3/6353 at 27:17 AM    An electronic signature was used to authenticate this note. This encounter was performed under myTristian DOs, direct supervision, 3/8/2021.   Tristian Jama

## 2021-03-12 ENCOUNTER — TELEPHONE (OUTPATIENT)
Dept: FAMILY MEDICINE CLINIC | Age: 86
End: 2021-03-12

## 2021-03-12 NOTE — TELEPHONE ENCOUNTER
Pts daughter called because pt has had diarrhea for several months and it cleared up for a little bit and now its back again, doesn't feel very well and isnt getting much sleep. The diarrhea has been back for sometime. Pts daughter is wondering if pt to take imodium or give something to help with the diarrhea.  Please Advise [phone]

## 2021-03-12 NOTE — TELEPHONE ENCOUNTER
Would need additional information regarding diarrhea to be able to recommend treatment vs evaluation.  Would recommend visit to discuss, can be virtual and with any provider if wanting to be seen today

## 2021-03-15 NOTE — TELEPHONE ENCOUNTER
Left voicemail with daughter to return call to get a virtual visit set up if symptoms have not subsided

## 2021-03-18 ENCOUNTER — OFFICE VISIT (OUTPATIENT)
Dept: FAMILY MEDICINE CLINIC | Age: 86
End: 2021-03-18
Payer: MEDICARE

## 2021-03-18 VITALS
SYSTOLIC BLOOD PRESSURE: 126 MMHG | HEIGHT: 63 IN | WEIGHT: 121.2 LBS | OXYGEN SATURATION: 99 % | HEART RATE: 61 BPM | BODY MASS INDEX: 21.48 KG/M2 | TEMPERATURE: 97 F | DIASTOLIC BLOOD PRESSURE: 70 MMHG

## 2021-03-18 DIAGNOSIS — R63.4 WEIGHT LOSS: ICD-10-CM

## 2021-03-18 DIAGNOSIS — R19.7 DIARRHEA, UNSPECIFIED TYPE: ICD-10-CM

## 2021-03-18 DIAGNOSIS — R19.7 DIARRHEA, UNSPECIFIED TYPE: Primary | ICD-10-CM

## 2021-03-18 PROCEDURE — 99214 OFFICE O/P EST MOD 30 MIN: CPT | Performed by: STUDENT IN AN ORGANIZED HEALTH CARE EDUCATION/TRAINING PROGRAM

## 2021-03-18 RX ORDER — LOPERAMIDE HYDROCHLORIDE 2 MG/1
2 TABLET ORAL 4 TIMES DAILY PRN
Qty: 60 TABLET | Refills: 1 | Status: SHIPPED | OUTPATIENT
Start: 2021-03-18 | End: 2022-03-24

## 2021-03-18 NOTE — PROGRESS NOTES
Patient: Margarita Rendon is a 82 y.o. female who presents today with the following Chief Complaint(s):  Chief Complaint   Patient presents with    Diarrhea     has had diarrhea for the past couple months has taken immodium with no relief and has kept up electrolytes with pedialyte       HPI     Diarrhea  Started 3 months ago, Loose stools 6 x day. Now using the restroom 4 x day. No mucous, no bleeding, no abdominal. No nausea or vomiting. Reports that diarrhea can occur at any time of the day, not related to eating. Following with Dr. Yenny Nevarez in 2 weeks. Current Outpatient Medications   Medication Sig Dispense Refill    loperamide (IMODIUM A-D) 2 MG tablet Take 1 tablet by mouth 4 times daily as needed for Diarrhea 60 tablet 1    atorvastatin (LIPITOR) 10 MG tablet Take 1 tablet by mouth once daily 90 tablet 0    hydrOXYzine (ATARAX) 25 MG tablet TAKE 1 TABLET BY MOUTH ONCE DAILY AS NEEDED FOR ANXIETY 30 tablet 0    tiZANidine (ZANAFLEX) 2 MG tablet TAKE 1 TABLET BY MOUTH NIGHTLY AS NEEDED FOR MUSCLE SPASM 30 tablet 0    lisinopril (PRINIVIL;ZESTRIL) 20 MG tablet TAKE 1 TABLET BY MOUTH ONCE DAILY 90 tablet 1    diclofenac sodium 1 % GEL Apply 4 g topically 4 times daily 2 Tube 5    Cholecalciferol (VITAMIN D) 2000 units CAPS capsule Take by mouth      Calcium Carbonate-Vit D-Min (CALCIUM 1200 PO) Take by mouth      aspirin 81 MG tablet Take 81 mg by mouth daily. No current facility-administered medications for this visit. Patient's past medical history, surgical history, family history, medications,  andallergies  were all reviewed and updated as appropriate today. Review of Systems  All other systems reviewed and negative    Physical Exam  Constitutional:       General: She is not in acute distress. Appearance: Normal appearance. Cardiovascular:      Rate and Rhythm: Normal rate and regular rhythm.    Pulmonary:      Effort: Pulmonary effort is normal. No respiratory distress. Abdominal:      General: Abdomen is flat. There is no distension. Palpations: Abdomen is soft. There is no mass. Tenderness: There is no abdominal tenderness. There is no guarding or rebound. Hernia: No hernia is present. Comments: Hyperactive bowel sounds   Skin:     Coloration: Skin is pale. Neurological:      General: No focal deficit present. Mental Status: She is alert and oriented to person, place, and time. Vitals:    03/18/21 1513   BP: 126/70   Pulse: 61   Temp: 97 °F (36.1 °C)   SpO2: 99%       Assessment:  Encounter Diagnoses   Name Primary?  Diarrhea, unspecified type Yes    Weight loss        Plan:  1. Diarrhea, unspecified type  2. Weight loss  Patient with new onset diarrhea over last 3 months. No GI history. No blood, mucous or pain. Has lost 7 lbs in this time period. Appetite normal. Diarrhea unrelated to time of day or eating. Stool studies as below,   - Fecal Leukocytes; Future  - Clostridium Difficile Toxin/Antigen; Future  - loperamide (IMODIUM A-D) 2 MG tablet; Take 1 tablet by mouth 4 times daily as needed for Diarrhea  Dispense: 60 tablet; Refill: 1  - TSH with Reflex; Future  - Occult Blood Screen; Future  - OVA & PARASITE ID/COUNT #1; Future  - Dietary Nutrition Supplements  - COMPREHENSIVE METABOLIC PANEL; Future  - CBC; Future            No follow-ups on file. no

## 2021-03-19 PROBLEM — D63.1 ANEMIA DUE TO CHRONIC KIDNEY DISEASE: Status: ACTIVE | Noted: 2021-03-19

## 2021-03-19 PROBLEM — N18.9 ANEMIA DUE TO CHRONIC KIDNEY DISEASE: Status: ACTIVE | Noted: 2021-03-19

## 2021-03-19 LAB
A/G RATIO: 1.6 (ref 1.1–2.2)
ALBUMIN SERPL-MCNC: 4.2 G/DL (ref 3.4–5)
ALP BLD-CCNC: 45 U/L (ref 40–129)
ALT SERPL-CCNC: 12 U/L (ref 10–40)
ANION GAP SERPL CALCULATED.3IONS-SCNC: 11 MMOL/L (ref 3–16)
AST SERPL-CCNC: 21 U/L (ref 15–37)
BILIRUB SERPL-MCNC: 0.4 MG/DL (ref 0–1)
BUN BLDV-MCNC: 13 MG/DL (ref 7–20)
CALCIUM SERPL-MCNC: 9.2 MG/DL (ref 8.3–10.6)
CHLORIDE BLD-SCNC: 106 MMOL/L (ref 99–110)
CO2: 25 MMOL/L (ref 21–32)
CREAT SERPL-MCNC: 1 MG/DL (ref 0.6–1.2)
GFR AFRICAN AMERICAN: >60
GFR NON-AFRICAN AMERICAN: 53
GLOBULIN: 2.7 G/DL
GLUCOSE BLD-MCNC: 93 MG/DL (ref 70–99)
HCT VFR BLD CALC: 30.3 % (ref 36–48)
HEMOGLOBIN: 10 G/DL (ref 12–16)
MCH RBC QN AUTO: 30.2 PG (ref 26–34)
MCHC RBC AUTO-ENTMCNC: 33 G/DL (ref 31–36)
MCV RBC AUTO: 91.5 FL (ref 80–100)
PDW BLD-RTO: 15 % (ref 12.4–15.4)
PLATELET # BLD: 188 K/UL (ref 135–450)
PMV BLD AUTO: 9.7 FL (ref 5–10.5)
POTASSIUM SERPL-SCNC: 3.8 MMOL/L (ref 3.5–5.1)
RBC # BLD: 3.31 M/UL (ref 4–5.2)
SODIUM BLD-SCNC: 142 MMOL/L (ref 136–145)
TOTAL PROTEIN: 6.9 G/DL (ref 6.4–8.2)
TSH REFLEX: 3.61 UIU/ML (ref 0.27–4.2)
WBC # BLD: 5.8 K/UL (ref 4–11)

## 2021-03-21 ENCOUNTER — HOSPITAL ENCOUNTER (OUTPATIENT)
Age: 86
Setting detail: SPECIMEN
Discharge: HOME | End: 2021-03-21
Payer: MEDICARE

## 2021-03-21 PROCEDURE — 87449 NOS EACH ORGANISM AG IA: CPT

## 2021-03-21 PROCEDURE — 87177 OVA AND PARASITES SMEARS: CPT

## 2021-03-21 PROCEDURE — 83993 ASSAY FOR CALPROTECTIN FECAL: CPT

## 2021-03-21 PROCEDURE — 87324 CLOSTRIDIUM AG IA: CPT

## 2021-03-21 PROCEDURE — 87209 SMEAR COMPLEX STAIN: CPT

## 2021-03-22 ENCOUNTER — TELEPHONE (OUTPATIENT)
Dept: FAMILY MEDICINE CLINIC | Age: 86
End: 2021-03-22

## 2021-03-22 DIAGNOSIS — R19.7 DIARRHEA, UNSPECIFIED TYPE: ICD-10-CM

## 2021-03-22 DIAGNOSIS — R63.4 WEIGHT LOSS: ICD-10-CM

## 2021-03-22 LAB — C DIFF TOXIN/ANTIGEN: NORMAL

## 2021-03-22 NOTE — TELEPHONE ENCOUNTER
----- Message from Ananth Keith sent at 3/22/2021 10:58 AM EDT -----  Subject: Message to Provider    QUESTIONS  Information for Provider? Pt daughter Mark Bean is requesting a call back   about the fecal test that was given to the pt. Please contact to see if   she needs another test for her mother. ---------------------------------------------------------------------------  --------------  Vivek GREENWOOD  What is the best way for the office to contact you? OK to leave message on   voicemail  Preferred Call Back Phone Number? [phone]  ---------------------------------------------------------------------------  --------------  SCRIPT ANSWERS  Relationship to Patient? Other  Representative Name? Mark Bean  Is the Representative on the appropriate HIPAA document in Epic?  Yes

## 2021-03-23 LAB — CALPROTECTIN, FECAL: 88 UG/G

## 2021-03-23 NOTE — TELEPHONE ENCOUNTER
Daughter states that [facility] Occult Fecal Stool test was cancelled due to not being covered by Medicare. Daughter is not worried about this test being cancelled. Just wanted to make sure the other test were being processed. Test does show they are being processed.

## 2021-03-24 LAB — INTERPRETATION: NEGATIVE

## 2021-04-02 ENCOUNTER — INITIAL CONSULT (OUTPATIENT)
Dept: GASTROENTEROLOGY | Age: 86
End: 2021-04-02
Payer: MEDICARE

## 2021-04-02 VITALS
HEIGHT: 63 IN | HEART RATE: 69 BPM | BODY MASS INDEX: 21.09 KG/M2 | SYSTOLIC BLOOD PRESSURE: 113 MMHG | DIASTOLIC BLOOD PRESSURE: 61 MMHG | WEIGHT: 119 LBS

## 2021-04-02 DIAGNOSIS — R19.7 DIARRHEA, UNSPECIFIED TYPE: Primary | ICD-10-CM

## 2021-04-02 PROCEDURE — 99204 OFFICE O/P NEW MOD 45 MIN: CPT | Performed by: INTERNAL MEDICINE

## 2021-04-02 RX ORDER — BISACODYL 5 MG
TABLET, DELAYED RELEASE (ENTERIC COATED) ORAL
Qty: 4 TABLET | Refills: 0 | Status: ON HOLD | OUTPATIENT
Start: 2021-04-02 | End: 2021-05-03 | Stop reason: HOSPADM

## 2021-04-02 RX ORDER — POLYETHYLENE GLYCOL 3350 17 G/17G
238 POWDER ORAL ONCE
Qty: 238 G | Refills: 0 | Status: SHIPPED | OUTPATIENT
Start: 2021-04-02 | End: 2021-04-02

## 2021-04-02 RX ORDER — MONTELUKAST SODIUM 4 MG/1
1 TABLET, CHEWABLE ORAL 2 TIMES DAILY
Qty: 60 TABLET | Refills: 3 | Status: SHIPPED | OUTPATIENT
Start: 2021-04-02 | End: 2022-03-24

## 2021-04-02 NOTE — PROGRESS NOTES
Nash Thornton    [address] [facility] ,  [address] [facility]  [address]     Chief Complaint   Patient presents with    New Patient     diarrhea for about 3 months ; weight loss, over 30 lbs        HPI     Jenny Gregory is a 82 y.o. female who presents for chronic diarrhea. Patient is here with her daughter, patient is hard of hearing and poor historian and patient's daughter helps with the history. Patient reports that for the last 3 to 4 months or maybe longer she has had diarrhea pretty much every day has 6 loose stools every day, stools are watery, associated with a sense of urgency and often associated with episodes of fecal incontinence  Denies any rectal bleeding or melena  Does not have any significant abdominal pain  Denies any new medications  She has tried Imodium without much benefit  She recently did stool studies with her primary care doctor 3/21/2021 with mild elevation of fecal calprotectin at 88, C. difficile was negative, ova and parasite exam was negative  Labs showed normocytic chronic anemia with hemoglobin of 10, MCV 91  Creatinine 1.0, normal LFTs, normal TSH  Reports losing significant amount of weight but review of weights in Epic show her weight was around 128 pounds at PCP visit November 2020 and is 119 pounds today    Colonoscopy 11/9/2016 with Dr Tiny De Leon [facility] - diverticulosis,  internal hemorrhoids.     PAST MEDICAL HISTORY     Past Medical History:   Diagnosis Date    Arthritis     Chicken pox     Chills     CKD (chronic kidney disease) stage 3, GFR 30-59 ml/min ([facility]) 1/10/2019    Fever     Hip pain     Hyperlipidemia     Hypertension     Measles     Miscarriage     Osteoarthritis     Pneumonia     Poor circulation     Ringing in ears     Sacroiliitis ([facility] [address] 4/15/2016    Sensorineural hearing loss, bilateral 10/20/2020    Swelling of both ankles     Tonsillitis      FAMILY HISTORY     Family History   Problem Respiratory: Negative for cough, shortness of breath and wheezing. Cardiovascular: Negative for chest pain, palpitations and leg swelling. Gastrointestinal: see HPI for details. Endocrine: Negative for polydipsia, polyphagia and polyuria. Genitourinary: Negative for difficulty urinating, dysuria, hematuria and urgency. Musculoskeletal: Positive for arthralgias and back pain. Skin: Negative for pallor and rash. Allergic/Immunologic: Negative for environmental allergies and immunocompromised state. Neurological: Negative for seizures, syncope. Hematological: Negative for adenopathy. Does not bruise/bleed easily. Psychiatric/Behavioral: Negative for agitation, confusion, hallucinations. PHYSICAL EXAM   VITAL SIGNS: /61 (Site: Right Wrist, Position: Sitting, Cuff Size: Medium Adult)   Pulse 69   Ht 5' 3\" (1.6 m)   Wt 119 lb (54 kg)   BMI 21.08 kg/m²   Wt Readings from Last 3 Encounters:   04/02/21 119 lb (54 kg)   03/18/21 121 lb 3.2 oz (55 kg)   03/08/21 128 lb (58.1 kg)     Gen: AAO3, NAD  HEENT: no pallor or icterus  Neck: supple, no adenopathy  RS: clear to auscultation bilaterally  CVS: S1S2 RRR, no murmurs  GI: soft, nontender, not distended, BS+, no hepatosplenomegaly  Ext: no edema or clubbing    US RENAL COMPLETE  Narrative: EXAMINATION:  RETROPERITONEAL ULTRASOUND OF THE KIDNEYS AND URINARY BLADDER    1/27/2021    COMPARISON:  None    HISTORY:  ORDERING SYSTEM PROVIDED HISTORY: Stage 3 chronic kidney disease, unspecified  whether stage 3A or 3B CKD. TECHNOLOGIST PROVIDED HISTORY:    FINDINGS:    Kidneys: The right kidney measures 8.4 cm in length and the left kidney measures 7.9  cm in length. No suspicious renal mass, shadowing calculus, or hydronephrosis. Renal cortical thinning bilaterally suggests medical renal disease. Bladder:    Patient unable to hold her urine. Bladder nondistended, not evaluated.   Impression: Kidneys demonstrate no suspicious mass or hydronephrosis. Renal cortical  thinning bilaterally suggests medical renal disease. FINAL IMPRESSION     Chronic diarrhea - going on for 3-4 months, associated with intermittent episodes of fecal incontinence and urgency. C diff negative, mild calprotectin elevation  Differential includes microscopic colitis. Other etiologies need to be excluded  1. Stop magnesium supplement  2. Trial of Colestid 1 gm bid  3. Colonoscopy will be setup to assess for microscopic colitis, less likely IBD or neoplasia. Procedure discussed with patient in detail including risks, benefits and alternatives. Anesthesia risks, risk of perforation, bleeding discussed with the patient.   Will setup with MAC  Procedure also discussed with patient's daughter who accompanies her for the visit today  She agrees to proceeding

## 2021-04-05 DIAGNOSIS — R19.7 DIARRHEA, UNSPECIFIED TYPE: Primary | ICD-10-CM

## 2021-04-27 ENCOUNTER — HOSPITAL ENCOUNTER (OUTPATIENT)
Age: 86
Discharge: HOME | End: 2021-04-27
Payer: MEDICARE

## 2021-04-27 DIAGNOSIS — R19.7 DIARRHEA, UNSPECIFIED TYPE: ICD-10-CM

## 2021-04-27 PROCEDURE — U0005 INFEC AGEN DETEC AMPLI PROBE: HCPCS

## 2021-04-27 PROCEDURE — U0003 INFECTIOUS AGENT DETECTION BY NUCLEIC ACID (DNA OR RNA); SEVERE ACUTE RESPIRATORY SYNDROME CORONAVIRUS 2 (SARS-COV-2) (CORONAVIRUS DISEASE [COVID-19]), AMPLIFIED PROBE TECHNIQUE, MAKING USE OF HIGH THROUGHPUT TECHNOLOGIES AS DESCRIBED BY CMS-2020-01-R: HCPCS

## 2021-04-28 LAB — SARS-COV-2: NOT DETECTED

## 2021-04-30 ENCOUNTER — ANESTHESIA EVENT (OUTPATIENT)
Dept: ENDOSCOPY | Age: 86
End: 2021-04-30
Payer: MEDICARE

## 2021-04-30 RX ORDER — SODIUM CHLORIDE 9 MG/ML
25 INJECTION, SOLUTION INTRAVENOUS PRN
Status: CANCELLED | OUTPATIENT
Start: 2021-04-30

## 2021-04-30 RX ORDER — SODIUM CHLORIDE 0.9 % (FLUSH) 0.9 %
10 SYRINGE (ML) INJECTION EVERY 12 HOURS SCHEDULED
Status: CANCELLED | OUTPATIENT
Start: 2021-04-30

## 2021-04-30 RX ORDER — SODIUM CHLORIDE 0.9 % (FLUSH) 0.9 %
10 SYRINGE (ML) INJECTION PRN
Status: CANCELLED | OUTPATIENT
Start: 2021-04-30

## 2021-04-30 RX ORDER — LIDOCAINE HYDROCHLORIDE 10 MG/ML
1 INJECTION, SOLUTION EPIDURAL; INFILTRATION; INTRACAUDAL; PERINEURAL
Status: CANCELLED | OUTPATIENT
Start: 2021-04-30 | End: 2021-04-30

## 2021-04-30 RX ORDER — SODIUM CHLORIDE, SODIUM LACTATE, POTASSIUM CHLORIDE, CALCIUM CHLORIDE 600; 310; 30; 20 MG/100ML; MG/100ML; MG/100ML; MG/100ML
INJECTION, SOLUTION INTRAVENOUS CONTINUOUS
Status: CANCELLED | OUTPATIENT
Start: 2021-04-30

## 2021-04-30 NOTE — ANESTHESIA PRE PROCEDURE
Medical History:     Arthritis     Chicken pox     Chills     CKD (chronic kidney disease) stage 3, GFR 30-59 ml/min ([facility]) 1/10/2019    Fever     Hip pain     Hyperlipidemia     Hypertension     Measles     Miscarriage     Osteoarthritis     Pneumonia     Poor circulation     Ringing in ears     Sacroiliitis ([address] 4/15/2016    Sensorineural hearing loss, bilateral 10/20/2020    Swelling of both ankles     Tonsillitis      Past Surgical History:     COLONOSCOPY  11/09/2016    normal/ hemmoroids    SHOULDER SURGERY       Social History:    Tobacco Use    Smoking status: Never Smoker    Smokeless tobacco: Never Used   Substance Use Topics    Alcohol use: No     Vital Signs (Current):    BP: 116/59 Pulse: 62   Resp: 22 SpO2: 98   Temp: 97.2 °F (36.2 °C)   Height: 5' 3\" (1.6 m)  (05/03/21) Weight: 120 lb (54.4 kg)  (05/03/21)   BMI: 21.3           BP Readings from Last 3 Encounters:   04/02/21 113/61   03/18/21 126/70   03/08/21 120/80     NPO Status: >8 hrs                       BMI:   Wt Readings from Last 3 Encounters:   04/02/21 119 lb (54 kg)   03/18/21 121 lb 3.2 oz (55 kg)   03/08/21 128 lb (58.1 kg)       CBC:    WBC 5.8 03/18/2021    HGB 10.0 03/18/2021    HCT 30.3 03/18/2021     03/18/2021     CMP:     03/18/2021    K 3.8 03/18/2021     03/18/2021    CO2 25 03/18/2021    BUN 13 03/18/2021    CREATININE 1.0 03/18/2021    GFRAA >60 03/18/2021    GLUCOSE 93 03/18/2021    PROT 6.9 03/18/2021    CALCIUM 9.2 03/18/2021    BILITOT 0.4 03/18/2021    ALKPHOS 45 03/18/2021    AST 21 03/18/2021    ALT 12 03/18/2021     COVID-19 Screening (If Applicable):     COVID19 Not Detected 04/27/2021     Anesthesia Evaluation  Patient summary reviewed and Nursing notes reviewed no history of anesthetic complications:   Airway: Mallampati: II  TM distance: <3 FB   Neck ROM: limited  Comment: Small palatal torus  Mouth opening: < 3 FB Dental:          Pulmonary: breath sounds clear to

## 2021-05-03 ENCOUNTER — HOSPITAL ENCOUNTER (OUTPATIENT)
Age: 86
Setting detail: OUTPATIENT SURGERY
Discharge: HOME | End: 2021-05-03
Attending: INTERNAL MEDICINE | Admitting: INTERNAL MEDICINE
Payer: MEDICARE

## 2021-05-03 ENCOUNTER — TELEPHONE (OUTPATIENT)
Dept: GASTROENTEROLOGY | Age: 86
End: 2021-05-03

## 2021-05-03 ENCOUNTER — ANESTHESIA (OUTPATIENT)
Dept: ENDOSCOPY | Age: 86
End: 2021-05-03
Payer: MEDICARE

## 2021-05-03 VITALS
DIASTOLIC BLOOD PRESSURE: 62 MMHG | SYSTOLIC BLOOD PRESSURE: 112 MMHG | BODY MASS INDEX: 21.26 KG/M2 | RESPIRATION RATE: 14 BRPM | OXYGEN SATURATION: 100 % | WEIGHT: 120 LBS | HEART RATE: 60 BPM | HEIGHT: 63 IN | TEMPERATURE: 97.4 F

## 2021-05-03 VITALS
RESPIRATION RATE: 13 BRPM | OXYGEN SATURATION: 97 % | SYSTOLIC BLOOD PRESSURE: 84 MMHG | DIASTOLIC BLOOD PRESSURE: 39 MMHG

## 2021-05-03 DIAGNOSIS — R19.7 DIARRHEA, UNSPECIFIED TYPE: ICD-10-CM

## 2021-05-03 PROBLEM — K57.30 DIVERTICULOSIS OF COLON: Status: ACTIVE | Noted: 2021-05-03

## 2021-05-03 PROCEDURE — 7100000010 HC PHASE II RECOVERY - FIRST 15 MIN: Performed by: INTERNAL MEDICINE

## 2021-05-03 PROCEDURE — 6360000002 HC RX W HCPCS: Performed by: NURSE ANESTHETIST, CERTIFIED REGISTERED

## 2021-05-03 PROCEDURE — 2500000003 HC RX 250 WO HCPCS: Performed by: NURSE ANESTHETIST, CERTIFIED REGISTERED

## 2021-05-03 PROCEDURE — 2580000003 HC RX 258: Performed by: NURSE ANESTHETIST, CERTIFIED REGISTERED

## 2021-05-03 PROCEDURE — 3609010300 HC COLONOSCOPY W/BIOPSY SINGLE/MULTIPLE: Performed by: INTERNAL MEDICINE

## 2021-05-03 PROCEDURE — 7100000011 HC PHASE II RECOVERY - ADDTL 15 MIN: Performed by: INTERNAL MEDICINE

## 2021-05-03 PROCEDURE — 3700000000 HC ANESTHESIA ATTENDED CARE: Performed by: INTERNAL MEDICINE

## 2021-05-03 PROCEDURE — 2709999900 HC NON-CHARGEABLE SUPPLY: Performed by: INTERNAL MEDICINE

## 2021-05-03 PROCEDURE — 88305 TISSUE EXAM BY PATHOLOGIST: CPT

## 2021-05-03 PROCEDURE — 3700000001 HC ADD 15 MINUTES (ANESTHESIA): Performed by: INTERNAL MEDICINE

## 2021-05-03 PROCEDURE — 45380 COLONOSCOPY AND BIOPSY: CPT | Performed by: INTERNAL MEDICINE

## 2021-05-03 RX ORDER — PROPOFOL 10 MG/ML
INJECTION, EMULSION INTRAVENOUS PRN
Status: DISCONTINUED | OUTPATIENT
Start: 2021-05-03 | End: 2021-05-03 | Stop reason: SDUPTHER

## 2021-05-03 RX ORDER — PROMETHAZINE HYDROCHLORIDE 25 MG/ML
6.25 INJECTION, SOLUTION INTRAMUSCULAR; INTRAVENOUS
Status: DISCONTINUED | OUTPATIENT
Start: 2021-05-03 | End: 2021-05-03 | Stop reason: HOSPADM

## 2021-05-03 RX ORDER — SODIUM CHLORIDE, SODIUM LACTATE, POTASSIUM CHLORIDE, CALCIUM CHLORIDE 600; 310; 30; 20 MG/100ML; MG/100ML; MG/100ML; MG/100ML
INJECTION, SOLUTION INTRAVENOUS CONTINUOUS PRN
Status: DISCONTINUED | OUTPATIENT
Start: 2021-05-03 | End: 2021-05-03 | Stop reason: SDUPTHER

## 2021-05-03 RX ORDER — LIDOCAINE HYDROCHLORIDE 20 MG/ML
INJECTION, SOLUTION EPIDURAL; INFILTRATION; INTRACAUDAL; PERINEURAL PRN
Status: DISCONTINUED | OUTPATIENT
Start: 2021-05-03 | End: 2021-05-03 | Stop reason: SDUPTHER

## 2021-05-03 RX ORDER — OXYCODONE HYDROCHLORIDE AND ACETAMINOPHEN 5; 325 MG/1; MG/1
1 TABLET ORAL PRN
Status: DISCONTINUED | OUTPATIENT
Start: 2021-05-03 | End: 2021-05-03 | Stop reason: HOSPADM

## 2021-05-03 RX ORDER — FENTANYL CITRATE 50 UG/ML
25 INJECTION, SOLUTION INTRAMUSCULAR; INTRAVENOUS EVERY 5 MIN PRN
Status: DISCONTINUED | OUTPATIENT
Start: 2021-05-03 | End: 2021-05-03 | Stop reason: HOSPADM

## 2021-05-03 RX ORDER — MEPERIDINE HYDROCHLORIDE 25 MG/ML
12.5 INJECTION INTRAMUSCULAR; INTRAVENOUS; SUBCUTANEOUS EVERY 5 MIN PRN
Status: DISCONTINUED | OUTPATIENT
Start: 2021-05-03 | End: 2021-05-03 | Stop reason: HOSPADM

## 2021-05-03 RX ORDER — ONDANSETRON 2 MG/ML
4 INJECTION INTRAMUSCULAR; INTRAVENOUS
Status: DISCONTINUED | OUTPATIENT
Start: 2021-05-03 | End: 2021-05-03 | Stop reason: HOSPADM

## 2021-05-03 RX ORDER — OXYCODONE HYDROCHLORIDE AND ACETAMINOPHEN 5; 325 MG/1; MG/1
2 TABLET ORAL PRN
Status: DISCONTINUED | OUTPATIENT
Start: 2021-05-03 | End: 2021-05-03 | Stop reason: HOSPADM

## 2021-05-03 RX ORDER — HYDRALAZINE HYDROCHLORIDE 20 MG/ML
5 INJECTION INTRAMUSCULAR; INTRAVENOUS EVERY 10 MIN PRN
Status: DISCONTINUED | OUTPATIENT
Start: 2021-05-03 | End: 2021-05-03 | Stop reason: HOSPADM

## 2021-05-03 RX ADMIN — PROPOFOL 30 MG: 10 INJECTION, EMULSION INTRAVENOUS at 09:54

## 2021-05-03 RX ADMIN — SODIUM CHLORIDE, POTASSIUM CHLORIDE, SODIUM LACTATE AND CALCIUM CHLORIDE: 600; 310; 30; 20 INJECTION, SOLUTION INTRAVENOUS at 09:35

## 2021-05-03 RX ADMIN — PROPOFOL 30 MG: 10 INJECTION, EMULSION INTRAVENOUS at 09:50

## 2021-05-03 RX ADMIN — PROPOFOL 30 MG: 10 INJECTION, EMULSION INTRAVENOUS at 09:59

## 2021-05-03 RX ADMIN — LIDOCAINE HYDROCHLORIDE 60 MG: 20 INJECTION, SOLUTION EPIDURAL; INFILTRATION; INTRACAUDAL; PERINEURAL at 09:50

## 2021-05-03 ASSESSMENT — PAIN SCALES - GENERAL: PAINLEVEL_OUTOF10: 0

## 2021-05-03 ASSESSMENT — PAIN - FUNCTIONAL ASSESSMENT: PAIN_FUNCTIONAL_ASSESSMENT: 0-10

## 2021-05-03 NOTE — TELEPHONE ENCOUNTER
----- Message from Armando Salcedo MD sent at 5/3/2021 10:13 AM EDT -----  Followup Dr Yadiel Renee 4-6 weeks

## 2021-05-03 NOTE — H&P
Chief Complaint   Patient presents with    New Patient       diarrhea for about 3 months ; weight loss, over 30 lbs         LEONARDO Nix is a 82 y.o. female who presents for chronic diarrhea. Patient is here with her daughter, patient is hard of hearing and poor historian and patient's daughter helps with the history.   Patient reports that for the last 3 to 4 months or maybe longer she has had diarrhea pretty much every day has 6 loose stools every day, stools are watery, associated with a sense of urgency and often associated with episodes of fecal incontinence  Denies any rectal bleeding or melena  Does not have any significant abdominal pain  Denies any new medications  She has tried Imodium without much benefit  She recently did stool studies with her primary care doctor 3/21/2021 with mild elevation of fecal calprotectin at 88, C. difficile was negative, ova and parasite exam was negative  Labs showed normocytic chronic anemia with hemoglobin of 10, MCV 91  Creatinine 1.0, normal LFTs, normal TSH  Reports losing significant amount of weight but review of weights in Epic show her weight was around 128 pounds at PCP visit November 2020 and is 119 pounds today     Colonoscopy 11/9/2016 with Dr Dnona Mccracken of [facility] - diverticulosis,  internal hemorrhoids.     PAST MEDICAL HISTORY      Past Medical History        Past Medical History:   Diagnosis Date    Arthritis      Chicken pox      Chills      CKD (chronic kidney disease) stage 3, GFR 30-59 ml/min ([address] 1/10/2019    Fever      Hip pain      Hyperlipidemia      Hypertension      Measles      Miscarriage      Osteoarthritis      Pneumonia      Poor circulation      Ringing in ears      Sacroiliitis ([address] 4/15/2016    Sensorineural hearing loss, bilateral 10/20/2020    Swelling of both ankles      Tonsillitis           FAMILY HISTORY      Family History         Family History   Problem Relation Age of Onset    Heart Disease Mother      Stroke Mother      High Blood Pressure Mother      Arthritis Mother      High Blood Pressure Brother           SOCIAL HISTORY      Social History               Socioeconomic History    Marital status:        Spouse name: Not on file    Number of children: Not on file    Years of education: Not on file    Highest education level: Not on file   Occupational History    Not on file   Social Needs    Financial resource strain: Not on file    Food insecurity       Worry: Not on file       Inability: Not on file    Transportation needs       Medical: Not on file       Non-medical: Not on file   Tobacco Use    Smoking status: Never Smoker    Smokeless tobacco: Never Used   Substance and Sexual Activity    Alcohol use: No    Drug use: No    Sexual activity: Yes       Partners: Male   Lifestyle    Physical activity       Days per week: Not on file       Minutes per session: Not on file    Stress: Not on file   Relationships    Social connections       Talks on phone: Not on file       Gets together: Not on file       Attends Sabianist service: Not on file       Active member of club or organization: Not on file       Attends meetings of clubs or organizations: Not on file       Relationship status: Not on file    Intimate partner violence       Fear of current or ex partner: Not on file       Emotionally abused: Not on file       Physically abused: Not on file       Forced sexual activity: Not on file   Other Topics Concern    Not on file   Social History Narrative    Not on file         SURGICAL HISTORY      Past Surgical History         Past Surgical History:   Procedure Laterality Date    COLONOSCOPY   11/09/2016     normal/ hemmoroids    SHOULDER SURGERY             CURRENT MEDICATIONS   (This list may include medications prescribed during this encounter as epic can not insert only the list prior to this encounter.)  Current Outpatient Rx          Current Outpatient Rx Medication Sig Dispense Refill    colestipol (COLESTID) 1 g tablet Take 1 tablet by mouth 2 times daily 60 tablet 3    bisacodyl (BISACODYL) 5 MG EC tablet Take 4 tablets of Bisacodyl for colonoscopy prep as directed.  4 tablet 0    polyethylene glycol (MIRALAX) 17 GM/SCOOP POWD powder Take 238 g by mouth once for 1 dose Take as directed for colonoscopy prep 238 g 0    loperamide (IMODIUM A-D) 2 MG tablet Take 1 tablet by mouth 4 times daily as needed for Diarrhea 60 tablet 1    atorvastatin (LIPITOR) 10 MG tablet Take 1 tablet by mouth once daily 90 tablet 0    hydrOXYzine (ATARAX) 25 MG tablet TAKE 1 TABLET BY MOUTH ONCE DAILY AS NEEDED FOR ANXIETY 30 tablet 0    tiZANidine (ZANAFLEX) 2 MG tablet TAKE 1 TABLET BY MOUTH NIGHTLY AS NEEDED FOR MUSCLE SPASM 30 tablet 0    lisinopril (PRINIVIL;ZESTRIL) 20 MG tablet TAKE 1 TABLET BY MOUTH ONCE DAILY 90 tablet 1    diclofenac sodium 1 % GEL Apply 4 g topically 4 times daily 2 Tube 5    Cholecalciferol (VITAMIN D) 2000 units CAPS capsule Take by mouth        Calcium Carbonate-Vit D-Min (CALCIUM 1200 PO) Take by mouth        aspirin 81 MG tablet Take 81 mg by mouth daily.                ALLERGIES           Allergies   Allergen Reactions    Codeine Hives    Toprol Xl [Metoprolol]           IMMUNIZATIONS           Immunization History   Administered Date(s) Administered    Influenza Vaccine, unspecified formulation 10/05/2015, 11/26/2016    Influenza, High Dose (Fluzone 65 yrs and older) 10/11/2018    Influenza, Epimenio Brooklynn, IM, (6 mo and older Fluzone, Flulaval, Fluarix and 3 yrs and older Afluria) 10/05/2017    Influenza, Quadv, adjuvanted, 65 yrs +, IM, PF (Fluad) 10/01/2020    Influenza, Triv, inactivated, subunit, adjuvanted, IM (Fluad 65 yrs and older) 10/15/2019    Pneumococcal Conjugate 13-valent ([id number]) 01/08/2016    Pneumococcal Polysaccharide ([id number]) 01/05/2018         REVIEW OF SYSTEMS      Constitutional: denies fever and unexpected weight change. HENT: Negative for ear pain, hearing loss and nosebleeds. Eyes: Negative for pain and visual disturbance. Respiratory: Negative for cough, shortness of breath and wheezing. Cardiovascular: Negative for chest pain, palpitations and leg swelling. Gastrointestinal: see HPI for details. Endocrine: Negative for polydipsia, polyphagia and polyuria. Genitourinary: Negative for difficulty urinating, dysuria, hematuria and urgency. Musculoskeletal: Positive for arthralgias and back pain. Skin: Negative for pallor and rash. Allergic/Immunologic: Negative for environmental allergies and immunocompromised state. Neurological: Negative for seizures, syncope. Hematological: Negative for adenopathy. Does not bruise/bleed easily. Psychiatric/Behavioral: Negative for agitation, confusion, hallucinations.     PHYSICAL EXAM   VITAL SIGNS: /61 (Site: Right Wrist, Position: Sitting, Cuff Size: Medium Adult)   Pulse 69   Ht 5' 3\" (1.6 m)   Wt 119 lb (54 kg)   BMI 21.08 kg/m²       Wt Readings from Last 3 Encounters:   04/02/21 119 lb (54 kg)   03/18/21 121 lb 3.2 oz (55 kg)   03/08/21 128 lb (58.1 kg)      Gen: AAO3, NAD  HEENT: no pallor or icterus  Neck: supple, no adenopathy  RS: clear to auscultation bilaterally  CVS: S1S2 RRR, no murmurs  GI: soft, nontender, not distended, BS+, no hepatosplenomegaly  Ext: no edema or clubbing     US RENAL COMPLETE  Narrative: EXAMINATION:  RETROPERITONEAL ULTRASOUND OF THE KIDNEYS AND URINARY BLADDER     1/27/2021     COMPARISON:  None     HISTORY:  ORDERING SYSTEM PROVIDED HISTORY: Stage 3 chronic kidney disease, unspecified  whether stage 3A or 3B CKD.   TECHNOLOGIST PROVIDED HISTORY:     FINDINGS:     Kidneys:     The right kidney measures 8.4 cm in length and the left kidney measures 7.9  cm in length.     No suspicious renal mass, shadowing calculus, or hydronephrosis.     Renal cortical thinning bilaterally suggests medical renal disease.     Bladder:     Patient unable to hold her urine. Bladder nondistended, not evaluated. Impression: Kidneys demonstrate no suspicious mass or hydronephrosis. Renal cortical  thinning bilaterally suggests medical renal disease.        FINAL IMPRESSION      Chronic diarrhea - going on for 3-4 months, associated with intermittent episodes of fecal incontinence and urgency. C diff negative, mild calprotectin elevation  Differential includes microscopic colitis. Other etiologies need to be excluded  1. Stop magnesium supplement  2. Trial of Colestid 1 gm bid  3. Colonoscopy to assess for microscopic colitis, less likely IBD or neoplasia. Procedure discussed with patient in detail including risks, benefits and alternatives. Anesthesia risks, risk of perforation, bleeding discussed with the patient.   Procedure with MAC  Procedure also discussed with patient's daughter who accompanies her for the visit today  She agrees to proceeding

## 2021-05-03 NOTE — ANESTHESIA POSTPROCEDURE EVALUATION
Department of Anesthesiology  Postprocedure Note    Patient: Casandra Harris  MRN: [id number]  YOB: 1935  Date of evaluation: 5/3/2021    Procedure Summary     Date: 05/03/21 Room / Location: [facility] ENDO 01 / [facility]    Anesthesia Start: 9344 Anesthesia Stop: 1020    Procedure: COLONOSCOPY WITH BIOPSY (N/A ) Diagnosis:       Diarrhea, unspecified type      (DIARRHEA)    Surgeons: Sherryle Bouquet, MD Responsible Provider: Nadine Daly MD    Anesthesia Type: General ASA Status: 2          Anesthesia Type: General    Willy Phase I: Willy Score: 10    Willy Phase II: Willy Score: 10    Last vitals: Reviewed and per EMR flowsheets.        Anesthesia Post Evaluation   Anesthetic Problems: no   Cardiovascular System Stable: yes  Respiratory Function: Airway Patent yes  ETT no  Ventilator no  Level of consciousness: awake, alert and oriented  Post-op pain: adequate analgesia  Hydration Adequate: yes  Nausea/Vomiting:no  Other Issues:     Letty Sharma MD

## 2021-05-03 NOTE — OP NOTE
[address] ,  [address] [facility]  Phone: [phone]   ICB:[phone]    Colonoscopy Procedure Note    Patient: Allan Porras  : 1935    Procedure: Colonoscopy with --diagnostic    Date:  5/3/2021     Endoscopist:  Cristopher Beauchamp MD    Referring Physician:  Yosvany Dupree DO    Preoperative Diagnosis:  Diarrhea, unspecified type [R19.7]    Postoperative Diagnosis:  See impression    Anesthesia: Anesthesia: MAC  Sedation: see anesthesia notes for details. Start Time: 9:55  Stop Time: 10:08  Withdrawal time: 9 minutes  ASA Class: 3  Mallampati: II (soft palate, uvula, fauces visible)    Indications: This is a 82y.o. year old female who presents today with chronic diarrhea for colonoscopy. Procedure Details  Informed consent was obtained for the procedure, including sedation. Risks of perforation, hemorrhage, adverse drug reaction and aspiration were discussed. The patient was placed in the left lateral decubitus position. Based on the pre-procedure assessment, including review of the patient's medical history, medications, allergies, and review of systems, she had been deemed to be an appropriate candidate for conscious sedation; she was therefore sedated with the medications listed below. The patient was monitored continuously with ECG tracing, pulse oximetry, blood pressure monitoring, and direct observations. rectal examination was performed. The colonoscope was inserted into the rectum and advanced under direct vision to the terminal ileum. The quality of the colonic preparation was good. A careful inspection was made as the colonoscope was withdrawn, including a retroflexed view of the rectum; findings and interventions are described below. Appropriate photodocumentation was obtained. Patient tolerated the procedure well. Findings: -Severe diverticulosis in the left colon and moderate diverticulosis in the right colon. Medium internal hemorrhoids. Otherwise normal colonoscopy to the terminal ileum. Random colon biopsies were done to rule out microscopic colitis. - Anesthesia issues: no    Specimens: Was Obtained: Bottle A, random colon biopsies, rule out microscopic colitis    Complications:   None    Estimated blood loss: minimal    Disposition:   PACU - hemodynamically stable. Impression:   -Severe diverticulosis in the left colon and moderate diverticulosis in the right colon. Medium internal hemorrhoids. Otherwise normal colonoscopy to the terminal ileum. Random colon biopsies were done to rule out microscopic colitis    Recommendations:  - Biopsies pending to rule out microscopic colitis as a cause for her chronic diarrhea. Some improvement with Colestid, continue this  Add fiber supplement such as Metamucil or Citrucel. Discussed with patients daughter today after procedure  Follow up office 4-6 weeks with one of my partners.         Reyes Slaughter 5/3/21 10:13 AM EDT

## 2021-05-04 DIAGNOSIS — K52.839 MICROSCOPIC COLITIS, UNSPECIFIED MICROSCOPIC COLITIS TYPE: Primary | ICD-10-CM

## 2021-05-04 RX ORDER — BUDESONIDE 3 MG/1
CAPSULE, COATED PELLETS ORAL
Qty: 210 CAPSULE | Refills: 0 | Status: SHIPPED | OUTPATIENT
Start: 2021-05-04 | End: 2021-10-04

## 2021-05-17 DIAGNOSIS — Z63.6 CAREGIVER STRESS: ICD-10-CM

## 2021-05-17 SDOH — SOCIAL STABILITY - SOCIAL INSECURITY: DEPENDENT RELATIVE NEEDING CARE AT HOME: Z63.6

## 2021-05-18 RX ORDER — HYDROXYZINE HYDROCHLORIDE 25 MG/1
TABLET, FILM COATED ORAL
Qty: 30 TABLET | Refills: 0 | Status: SHIPPED | OUTPATIENT
Start: 2021-05-18 | End: 2021-09-22

## 2021-05-18 NOTE — TELEPHONE ENCOUNTER
.  Last office visit 3/18/2021     Last written 2-9-21 30 with 0      Next office visit scheduled Visit date not found    Requested Prescriptions     Pending Prescriptions Disp Refills    hydrOXYzine (ATARAX) 25 MG tablet [Pharmacy Med Name: hydrOXYzine HCl 25 MG Oral Tablet] 30 tablet 0     Sig: TAKE 1 TABLET BY MOUTH ONCE DAILY AS NEEDED FOR ANXIETY

## 2021-06-23 ENCOUNTER — OFFICE VISIT (OUTPATIENT)
Dept: GASTROENTEROLOGY | Age: 86
End: 2021-06-23
Payer: MEDICARE

## 2021-06-23 VITALS
HEIGHT: 63 IN | DIASTOLIC BLOOD PRESSURE: 67 MMHG | BODY MASS INDEX: 20.73 KG/M2 | SYSTOLIC BLOOD PRESSURE: 101 MMHG | WEIGHT: 117 LBS | HEART RATE: 60 BPM

## 2021-06-23 DIAGNOSIS — K52.832 LYMPHOCYTIC COLITIS: Primary | ICD-10-CM

## 2021-06-23 DIAGNOSIS — R19.7 DIARRHEA, UNSPECIFIED TYPE: ICD-10-CM

## 2021-06-23 PROCEDURE — 99213 OFFICE O/P EST LOW 20 MIN: CPT | Performed by: INTERNAL MEDICINE

## 2021-06-23 NOTE — PROGRESS NOTES
[address],  [address] [place] [address] Ave  [place], [address] [place] [address]  Phone: [phone]   Margarita Carmonasus 83 She is a  [2] White [1] 82 y.o. [place] female        Main Problems/Chief Complaint     Microscopic colitis    HPI    Pt is much better with 9mg of Budesonide a day. Stools are forming and the diarrhea I less. PAST MEDICAL HISTORY     Past Medical History:   Diagnosis Date    Arthritis     Chicken pox     Chills     CKD (chronic kidney disease) stage 3, GFR 30-59 ml/min ([facility]) 1/10/2019    Fever     Hip pain     Hyperlipidemia     Hypertension     Measles     Miscarriage     Osteoarthritis     Pneumonia     Poor circulation     Ringing in ears     Sacroiliitis ([address] 4/15/2016    Sensorineural hearing loss, bilateral 10/20/2020    Swelling of both ankles     Tonsillitis      FAMILY HISTORY     Family History   Problem Relation Age of Onset    Heart Disease Mother     Stroke Mother     High Blood Pressure Mother     Arthritis Mother     High Blood Pressure Brother      SOCIAL HISTORY     Social History     Socioeconomic History    Marital status:      Spouse name: Not on file    Number of children: Not on file    Years of education: Not on file    Highest education level: Not on file   Occupational History    Not on file   Tobacco Use    Smoking status: Never Smoker    Smokeless tobacco: Never Used   Substance and Sexual Activity    Alcohol use: No    Drug use: No    Sexual activity: Yes     Partners: Male   Other Topics Concern    Not on file   Social History Narrative    Not on file     Social Determinants of Health     Financial Resource Strain:     Difficulty of Paying Living Expenses:    Food Insecurity:     Worried About Running Out of Food in the Last Year:     Ran Out of Food in the Last Year:    Transportation Needs:     Lack of Transportation (Medical):      Lack of Transportation (Non-Medical):    Physical Activity:     Days of Exercise per Week:     Minutes of Exercise per Session:    Stress:     Feeling of Stress :    Social Connections:     Frequency of Communication with Friends and Family:     Frequency of Social Gatherings with Friends and Family:     Attends Faith Services:     Active Member of Clubs or Organizations:     Attends Club or Organization Meetings:     Marital Status:    Intimate Partner Violence:     Fear of Current or Ex-Partner:     Emotionally Abused:     Physically Abused:     Sexually Abused:      SURGICAL HISTORY     Past Surgical History:   Procedure Laterality Date    COLONOSCOPY  11/09/2016    normal/ hemmoroids    COLONOSCOPY N/A 5/3/2021    COLONOSCOPY WITH BIOPSY performed by Ashtuosh Hutchinson MD at [address] (N/A )    SHOULDER SURGERY       CURRENT MEDICATIONS   (This list may include medications prescribed during this encounter as epic can not insert only the list prior to this encounter.)  Current Outpatient Rx   Medication Sig Dispense Refill    lisinopril (PRINIVIL;ZESTRIL) 20 MG tablet Take 1 tablet by mouth once daily 90 tablet 2    hydrOXYzine (ATARAX) 25 MG tablet TAKE 1 TABLET BY MOUTH ONCE DAILY AS NEEDED FOR ANXIETY 30 tablet 0    atorvastatin (LIPITOR) 10 MG tablet Take 1 tablet by mouth once daily 90 tablet 2    budesonide (ENTOCORT EC) 3 MG extended release capsule 9 mg daily for 8 weeks, then 6 mg for two weeks, then by 3 mg for another two weeks 210 capsule 0    colestipol (COLESTID) 1 g tablet Take 1 tablet by mouth 2 times daily 60 tablet 3    loperamide (IMODIUM A-D) 2 MG tablet Take 1 tablet by mouth 4 times daily as needed for Diarrhea 60 tablet 1    tiZANidine (ZANAFLEX) 2 MG tablet TAKE 1 TABLET BY MOUTH NIGHTLY AS NEEDED FOR MUSCLE SPASM 30 tablet 0    diclofenac sodium 1 % GEL Apply 4 g topically 4 times daily 2 Tube 5    Cholecalciferol (VITAMIN D) 2000 units CAPS capsule Take by mouth      Calcium Carbonate-Vit D-Min (CALCIUM 1200 PO) Take by mouth      aspirin 81 MG tablet Take 81 mg by mouth daily. ALLERGIES     Allergies   Allergen Reactions    Codeine Hives    Toprol Xl [Metoprolol]          FINAL IMPRESSION AND RECOMMENDATIONS     Microscopic colitis - better on Budesonide 9mg/d. F/U appt at [facility] next time. The total time spent face-to-face during this visit and before and after the visit was 20 minutes with more than 50% of the time spent in reviewing the electronic chart which showed biopsy-proven microscopic colitis and the treatment of this with colestipol initially and then budesonide, coordination of care for tapering the dose of budesonide as she is already better and the process involved in it and about microscopic colitis as the disease and in case of her recurrence of diarrhea, she should start with colestipol that she has at home to see if it helps before going back to budesonide. Priscilla Bailon MD 6/23/21 2:07 PM EDT    CC:  Noreen Small, DO      IMPORTANT: Please note that some portions of this note may have been created using Dragon voice recognition software. Some \"sound-alike\" and totally wrong word substitutions may have taken place due to known inherent limitations of any such software, including this voice recognition software. In spite of efforts to eliminate such errors, some may not have been corrected. So please read the note with this in mind and recognize such mistakes and understand the correct version using the  context. Thanks.

## 2021-07-19 ENCOUNTER — TELEPHONE (OUTPATIENT)
Dept: FAMILY MEDICINE CLINIC | Age: 86
End: 2021-07-19

## 2021-07-19 ENCOUNTER — NURSE TRIAGE (OUTPATIENT)
Dept: OTHER | Facility: CLINIC | Age: 86
End: 2021-07-19

## 2021-07-19 NOTE — TELEPHONE ENCOUNTER
Reason for Disposition   MODERATE weakness from poor fluid intake with no improvement after 2 hours of rest and fluids    Answer Assessment - Initial Assessment Questions  1. DESCRIPTION: \"Describe how you are feeling. \"      Pt stated she feels worn out. 2. SEVERITY: \"How bad is it? \"  \"Can you stand and walk? \"    - MILD - Feels weak or tired, but does not interfere with work, school or normal activities    - [facility] to stand and walk; weakness interferes with work, school, or normal activities    - SEVERE - Unable to stand or walk  Moderate. Daughter states mother takes a lot of naps. 3. ONSET:  \"When did the weakness begin? \"  7/18 per Pt. 4. CAUSE: \"What do you think is causing the weakness? \"  Pt stated her  alzheimer's and she believes it is stress. Daughter stated she had recently started medicine on her colitis. 5. MEDICINES: Franklyn Jessicah you recently started a new medicine or had a change in the amount of a medicine? \"    Entocort. 6. OTHER SYMPTOMS: \"Do you have any other symptoms? \" (e.g., chest pain, fever, cough, SOB, vomiting, diarrhea, bleeding, other areas of pain)  Denies. 7. PREGNANCY: \"Is there any chance you are pregnant? \" \"When was your last menstrual period? \"      N/A    Protocols used: WEAKNESS (GENERALIZED) AND FATIGUE-ADULT-OH    Received call from [facility] with Red Flag Complaint. Brief description of triage: Weakness, see above. Writer called into [place] [id number]    Triage indicates for patient to follow up in office this week per 2nd level triage with MD Roma.    Care advice provided, patient verbalizes understanding; denies any other questions or concerns; instructed to call back for any new or worsening symptoms. Writer provided warm transfer to [facility] for appointment scheduling. Attention Provider: Thank you for allowing me to participate in the care of your patient.   The patient was connected to triage in response to information provided to the ECC. Please do not respond through this encounter as the response is not directed to a shared pool.

## 2021-07-19 NOTE — TELEPHONE ENCOUNTER
Nurse triage reports concern that patient is weaker and more worn out since taking budesonide from gastroenterologist.     Patient states started yesterday but daughter concerned this has been going on much longer since starting budesonide. Recommended seeing in office this week.

## 2021-07-21 ENCOUNTER — OFFICE VISIT (OUTPATIENT)
Dept: FAMILY MEDICINE CLINIC | Age: 86
End: 2021-07-21
Payer: MEDICARE

## 2021-07-21 VITALS — SYSTOLIC BLOOD PRESSURE: 96 MMHG | DIASTOLIC BLOOD PRESSURE: 66 MMHG | HEART RATE: 47 BPM | OXYGEN SATURATION: 100 %

## 2021-07-21 DIAGNOSIS — R53.83 FATIGUE, UNSPECIFIED TYPE: ICD-10-CM

## 2021-07-21 DIAGNOSIS — I95.9 HYPOTENSION, UNSPECIFIED HYPOTENSION TYPE: ICD-10-CM

## 2021-07-21 DIAGNOSIS — D64.9 ANEMIA, UNSPECIFIED TYPE: ICD-10-CM

## 2021-07-21 DIAGNOSIS — N18.32 STAGE 3B CHRONIC KIDNEY DISEASE (HCC): ICD-10-CM

## 2021-07-21 DIAGNOSIS — R73.03 PRE-DIABETES: ICD-10-CM

## 2021-07-21 DIAGNOSIS — D64.9 ANEMIA, UNSPECIFIED TYPE: Primary | ICD-10-CM

## 2021-07-21 LAB
ANION GAP SERPL CALCULATED.3IONS-SCNC: 13 MMOL/L (ref 3–16)
BUN BLDV-MCNC: 30 MG/DL (ref 7–20)
CALCIUM SERPL-MCNC: 9.8 MG/DL (ref 8.3–10.6)
CHLORIDE BLD-SCNC: 107 MMOL/L (ref 99–110)
CO2: 24 MMOL/L (ref 21–32)
CREAT SERPL-MCNC: 1.2 MG/DL (ref 0.6–1.2)
GFR AFRICAN AMERICAN: 52
GFR NON-AFRICAN AMERICAN: 43
GLUCOSE BLD-MCNC: 89 MG/DL (ref 70–99)
HCT VFR BLD CALC: 33.9 % (ref 36–48)
HEMOGLOBIN: 11.1 G/DL (ref 12–16)
MCH RBC QN AUTO: 30.7 PG (ref 26–34)
MCHC RBC AUTO-ENTMCNC: 32.7 G/DL (ref 31–36)
MCV RBC AUTO: 93.8 FL (ref 80–100)
PDW BLD-RTO: 15.1 % (ref 12.4–15.4)
PLATELET # BLD: 164 K/UL (ref 135–450)
PMV BLD AUTO: 9.5 FL (ref 5–10.5)
POTASSIUM SERPL-SCNC: 4.6 MMOL/L (ref 3.5–5.1)
RBC # BLD: 3.61 M/UL (ref 4–5.2)
SODIUM BLD-SCNC: 144 MMOL/L (ref 136–145)
WBC # BLD: 5 K/UL (ref 4–11)

## 2021-07-21 PROCEDURE — 99214 OFFICE O/P EST MOD 30 MIN: CPT | Performed by: STUDENT IN AN ORGANIZED HEALTH CARE EDUCATION/TRAINING PROGRAM

## 2021-07-21 NOTE — PATIENT INSTRUCTIONS
Top number under 150, bottom number undre 90  Drink 64 oz of fluid a day. Stop Lisinopril  Call and talk with [facility]. [phone]. Can also use their website to look for resources.

## 2021-07-21 NOTE — PROGRESS NOTES
Patient: Poonam Trujillo is a 82 y.o. 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 Current Outpatient Medications   Medication Sig Dispense Refill    hydrOXYzine (ATARAX) 25 MG tablet TAKE 1 TABLET BY MOUTH ONCE DAILY AS NEEDED FOR ANXIETY 30 tablet 0    atorvastatin (LIPITOR) 10 MG tablet Take 1 tablet by mouth once daily 90 tablet 2    budesonide (ENTOCORT EC) 3 MG extended release capsule 9 mg daily for 8 weeks, then 6 mg for two weeks, then by 3 mg for another two weeks 210 capsule 0    colestipol (COLESTID) 1 g tablet Take 1 tablet by mouth 2 times daily 60 tablet 3    loperamide (IMODIUM A-D) 2 MG tablet Take 1 tablet by mouth 4 times daily as needed for Diarrhea 60 tablet 1    tiZANidine (ZANAFLEX) 2 MG tablet TAKE 1 TABLET BY MOUTH NIGHTLY AS NEEDED FOR MUSCLE SPASM 30 tablet 0    diclofenac sodium 1 % GEL Apply 4 g topically 4 times daily 2 Tube 5    Cholecalciferol (VITAMIN D) 2000 units CAPS capsule Take by mouth      Calcium Carbonate-Vit D-Min (CALCIUM 1200 PO) Take by mouth      aspirin 81 MG tablet Take 81 mg by mouth daily. No current facility-administered medications for this visit. Patient's past medical history, surgical history, family history, medications,  andallergies  were all reviewed and updated as appropriate today. Review of Systems  All other systems reviewed and negative    Physical Exam  Vitals reviewed. Constitutional:       Appearance: Normal appearance. HENT:      Head: Normocephalic and atraumatic. Cardiovascular:      Rate and Rhythm: Normal rate and regular rhythm.    Pulmonary:      Effort: Pulmonary effort is normal.      Breath sounds: Normal breath sounds. Neurological:      General: No focal deficit present. Mental Status: She is alert and oriented to person, place, and time. Psychiatric:         Behavior: Behavior normal.         Thought Content: Thought content normal.       Vitals:    07/21/21 1037   BP: 96/66   Pulse: (!) 47   SpO2: 100%       Assessment:  Encounter Diagnoses   Name Primary?  Anemia, unspecified type Yes    Fatigue, unspecified type     Stage 3b chronic kidney disease ([facility]     Pre-diabetes        Plan:  1. Anemia, unspecified type  Patient with history of anemia. Will recheck. - CBC; Future    2. Fatigue, unspecified type  Patient has chronic fatigue however reports worsening since starting budesonide medication. Patient bradycardic with low blood pressure today. Drinks minimal fluids throughout the day. Discussed likely dehydration contributory with bradycardia. Not currently on any medications that would be resulting in bradycardia. Recommend increasing hydration continue to monitor of symptoms. Will be off budesonide in 1 month although unlikely to be cause of fatigue.  - CBC; Future    3. Stage 3b chronic kidney disease ([facility]  We will recheck to see if worsening kidney function contributing to fatigue symptoms.  - BASIC METABOLIC PANEL; Future    4. Pre-diabetes  Previous A1c 6.0. Will recheck to see if worsening due to steroid use although gut specific.  - Hemoglobin A1C    5. Hypotension  Patient hypotensive on exam today with bradycardia. May be contributing to fatigue. Will stop lisinopril and increase fluid consumption. Will check BP at home to monitor for hypertension given discontinuation of medication. No follow-ups on file.

## 2021-07-22 LAB
ESTIMATED AVERAGE GLUCOSE: 122.6 MG/DL
HBA1C MFR BLD: 5.9 %

## 2021-08-23 ENCOUNTER — TELEPHONE (OUTPATIENT)
Dept: GASTROENTEROLOGY | Age: 86
End: 2021-08-23

## 2021-08-23 DIAGNOSIS — K52.832 LYMPHOCYTIC COLITIS: Primary | ICD-10-CM

## 2021-08-23 NOTE — TELEPHONE ENCOUNTER
medication refill request coletipol 1gm q tab by mouth twice daily    Last seen 6-  No future appt scheduled

## 2021-08-24 RX ORDER — MONTELUKAST SODIUM 4 MG/1
1 TABLET, CHEWABLE ORAL 2 TIMES DAILY
Qty: 60 TABLET | Refills: 5 | Status: SHIPPED | OUTPATIENT
Start: 2021-08-24 | End: 2022-03-24

## 2021-09-22 DIAGNOSIS — Z63.6 CAREGIVER STRESS: ICD-10-CM

## 2021-09-22 RX ORDER — HYDROXYZINE HYDROCHLORIDE 25 MG/1
TABLET, FILM COATED ORAL
Qty: 30 TABLET | Refills: 0 | Status: SHIPPED | OUTPATIENT
Start: 2021-09-22 | End: 2022-03-10 | Stop reason: SDUPTHER

## 2021-09-22 SDOH — SOCIAL STABILITY - SOCIAL INSECURITY: DEPENDENT RELATIVE NEEDING CARE AT HOME: Z63.6

## 2021-09-22 NOTE — TELEPHONE ENCOUNTER
.  Refill Request     Last Seen: Last Seen Department: 7/21/2021  Last Seen by PCP: 7/21/2021    Last Written: 5-18-21 30 with 0     Next Appointment:   Future Appointments   Date Time Provider Alejandra Anna   3/10/2022  1:15 PM SCHEDULE, ALICE MCGHEE [place] - [place]       Future appointment scheduled      Requested Prescriptions     Pending Prescriptions Disp Refills    hydrOXYzine (ATARAX) 25 MG tablet [Pharmacy Med Name: hydrOXYzine HCl 25 MG Oral Tablet] 30 tablet 0     Sig: TAKE 1 TABLET BY MOUTH ONCE DAILY AS NEEDED FOR ANXIETY

## 2021-09-27 ENCOUNTER — TELEPHONE (OUTPATIENT)
Dept: GASTROENTEROLOGY | Age: 86
End: 2021-09-27

## 2021-09-30 ENCOUNTER — OFFICE VISIT (OUTPATIENT)
Dept: FAMILY MEDICINE CLINIC | Age: 86
End: 2021-09-30
Payer: MEDICARE

## 2021-09-30 ENCOUNTER — HOSPITAL ENCOUNTER (OUTPATIENT)
Age: 86
Setting detail: SPECIMEN
Discharge: HOME | End: 2021-09-30
Payer: MEDICARE

## 2021-09-30 VITALS
OXYGEN SATURATION: 98 % | SYSTOLIC BLOOD PRESSURE: 110 MMHG | HEIGHT: 63 IN | TEMPERATURE: 97.6 F | RESPIRATION RATE: 16 BRPM | HEART RATE: 61 BPM | WEIGHT: 115 LBS | BODY MASS INDEX: 20.38 KG/M2 | DIASTOLIC BLOOD PRESSURE: 62 MMHG

## 2021-09-30 DIAGNOSIS — Z74.2 NEED FOR HOME HEALTH CARE: ICD-10-CM

## 2021-09-30 DIAGNOSIS — R63.4 WEIGHT LOSS: ICD-10-CM

## 2021-09-30 DIAGNOSIS — R19.7 DIARRHEA, UNSPECIFIED TYPE: ICD-10-CM

## 2021-09-30 DIAGNOSIS — R19.7 DIARRHEA, UNSPECIFIED TYPE: Primary | ICD-10-CM

## 2021-09-30 DIAGNOSIS — K52.839 MICROSCOPIC COLITIS, UNSPECIFIED MICROSCOPIC COLITIS TYPE: ICD-10-CM

## 2021-09-30 LAB
C DIFF TOXIN/ANTIGEN: NORMAL
WHITE BLOOD CELLS (WBC), STOOL: ABNORMAL

## 2021-09-30 PROCEDURE — 87329 GIARDIA AG IA: CPT

## 2021-09-30 PROCEDURE — 87324 CLOSTRIDIUM AG IA: CPT

## 2021-09-30 PROCEDURE — 99213 OFFICE O/P EST LOW 20 MIN: CPT | Performed by: NURSE PRACTITIONER

## 2021-09-30 PROCEDURE — 87449 NOS EACH ORGANISM AG IA: CPT

## 2021-09-30 PROCEDURE — 83630 LACTOFERRIN FECAL (QUAL): CPT

## 2021-09-30 PROCEDURE — 87328 CRYPTOSPORIDIUM AG IA: CPT

## 2021-09-30 PROCEDURE — 87336 ENTAMOEB HIST DISPR AG IA: CPT

## 2021-09-30 SDOH — ECONOMIC STABILITY: FOOD INSECURITY: WITHIN THE PAST 12 MONTHS, YOU WORRIED THAT YOUR FOOD WOULD RUN OUT BEFORE YOU GOT MONEY TO BUY MORE.: NEVER TRUE

## 2021-09-30 SDOH — ECONOMIC STABILITY: FOOD INSECURITY: WITHIN THE PAST 12 MONTHS, THE FOOD YOU BOUGHT JUST DIDN'T LAST AND YOU DIDN'T HAVE MONEY TO GET MORE.: NEVER TRUE

## 2021-09-30 ASSESSMENT — ENCOUNTER SYMPTOMS
CHEST TIGHTNESS: 0
DIARRHEA: 1
WHEEZING: 0
COUGH: 0
VOMITING: 0
CONSTIPATION: 0
SHORTNESS OF BREATH: 0
NAUSEA: 0

## 2021-09-30 ASSESSMENT — SOCIAL DETERMINANTS OF HEALTH (SDOH): HOW HARD IS IT FOR YOU TO PAY FOR THE VERY BASICS LIKE FOOD, HOUSING, MEDICAL CARE, AND HEATING?: NOT HARD AT ALL

## 2021-09-30 NOTE — PROGRESS NOTES
2021  Cleveland Galvan (: 1935)  82 y.o.    ASSESSMENT and PLAN:  Jori Sandoval was seen today for diarrhea. Diagnoses and all orders for this visit:    Diarrhea, unspecified type  -     CBC WITH AUTO DIFFERENTIAL; Future  -     BASIC METABOLIC PANEL; Future  -     MAGNESIUM; Future  -     LIPID PANEL; Future  -     Fecal Leukocytes; Future  -     C DIFF TOXIN/ANTIGEN; Future  -     OVA & PARASITE ID/COUNT #1; Future  -     [facility]  -Diagnosed with Microscopic Colitis earlier this year  -Just recently stopped taking Budesonide, was still having problems on medication, but symptoms were much better. -BP/HR improved from last visit.   -Blood-work and stool studies to rule out any infection of bacterial origin, anemia, electrolyte imbalances. -If stool studies negative, could consider short term imodium, and re-start of Budesonide.  -alarm signs and symptoms reviewed, present to ER or call office if experiencing fevers, confusion, abdominal pain, blood or mucous in stool, syncope. Weight loss  -     Fecal Leukocytes; Future  -     C DIFF TOXIN/ANTIGEN; Future  -     OVA & PARASITE ID/COUNT #1; Future  -Down to 115lb, last visit 117lb  -Appetite stable per patient. Closely monitor weight. Microscopic colitis, unspecified microscopic colitis type  -     [facility] - Jenna Luna DO, [facility]  -Daughter stated she isn't able to get pt in until late Oct due to her GI doctor retiring. [facility]PI  Presenting with Diarrhea which has recently worsened. Daughter present at appointment. Unable to give exact date when symptoms relapsed. Associated symptoms urgency, incontinence, nocturnal BMs. Having diarrhea episodes 4-6 times pet day, moderate volumes, watery with some formed, brown in color. Denies any blood, black stools, or mucous in stools.  States they aren't always high volume. Just recently stopped taking Budesonide, was still having problems even though was on Budesonide. Could be relaspse. Was diagnosed with Microscopic Colitis earlier this year. Denies abdominal pain, abdominal cramping, n/v, bloating, fevers, body aches, chills, palpitations, chest pain, shortness of breath, lightheadedness, dizziness. No PPI, NSAID, SSRI use, and does not smoke. Is on statin. Denies any correlation of diarrhea with food. Appetite has been stable. is ok, feels like she isn't drinking enough water. Drinks x3 12 ounces. Denies recent antibiotic use. Next appointment with GI is not until last week of October. Had a colonoscopy earlier this year. Diarrhea initially began earlier this year. Had history of anemia, and low bp/hr in the past. BP and HR normal this visit. Daughter was hoping to see if there were any options for home care. Currently pt is taking care of  with dementia. Review of Systems   Constitutional: Negative for activity change, appetite change, chills, diaphoresis, fatigue, fever and unexpected weight change. HENT: Negative. Respiratory: Negative for cough, chest tightness, shortness of breath and wheezing. Cardiovascular: Negative for chest pain, palpitations and leg swelling. Gastrointestinal: Positive for diarrhea. Negative for constipation, nausea and vomiting. Genitourinary: Negative. Negative for difficulty urinating and dysuria. Musculoskeletal: Negative. Negative for gait problem. Neurological: Negative. Negative for dizziness, syncope, weakness, light-headedness, numbness and headaches. Psychiatric/Behavioral: Negative. Allergies, past medical history, family history, and social history reviewed and unchanged from previous encounter.      Current Outpatient Medications   Medication Sig Dispense Refill    hydrOXYzine (ATARAX) 25 MG tablet TAKE 1 TABLET BY MOUTH ONCE DAILY AS NEEDED FOR ANXIETY 30 tablet 0    colestipol (COLESTID) 1 g tablet Take 1 tablet by mouth 2 times daily 60 tablet 5    atorvastatin (LIPITOR) 10 MG tablet Take 1 tablet by mouth once daily 90 tablet 2    budesonide (ENTOCORT EC) 3 MG extended release capsule 9 mg daily for 8 weeks, then 6 mg for two weeks, then by 3 mg for another two weeks 210 capsule 0    colestipol (COLESTID) 1 g tablet Take 1 tablet by mouth 2 times daily 60 tablet 3    loperamide (IMODIUM A-D) 2 MG tablet Take 1 tablet by mouth 4 times daily as needed for Diarrhea 60 tablet 1    tiZANidine (ZANAFLEX) 2 MG tablet TAKE 1 TABLET BY MOUTH NIGHTLY AS NEEDED FOR MUSCLE SPASM 30 tablet 0    diclofenac sodium 1 % GEL Apply 4 g topically 4 times daily 2 Tube 5    Cholecalciferol (VITAMIN D) 2000 units CAPS capsule Take by mouth      Calcium Carbonate-Vit D-Min (CALCIUM 1200 PO) Take by mouth      aspirin 81 MG tablet Take 81 mg by mouth daily. No current facility-administered medications for this visit. Vitals:    09/30/21 0918   BP: 110/62   Pulse: 61   Resp: 16   Temp: 97.6 °F (36.4 °C)   TempSrc: Oral   SpO2: 98%   Weight: 115 lb (52.2 kg)   Height: 5' 3\" (1.6 m)     Estimated body mass index is 20.37 kg/m² as calculated from the following:    Height as of this encounter: 5' 3\" (1.6 m). Weight as of this encounter: 115 lb (52.2 kg). Physical Exam  Vitals reviewed. Constitutional:       Appearance: Normal appearance. She is normal weight. HENT:      Head: Normocephalic and atraumatic. Nose: Nose normal.   Eyes:      Conjunctiva/sclera: Conjunctivae normal.   Cardiovascular:      Rate and Rhythm: Normal rate and regular rhythm. Pulses: Normal pulses. Heart sounds: Normal heart sounds. Pulmonary:      Effort: Pulmonary effort is normal.      Breath sounds: Normal breath sounds. Abdominal:      General: Abdomen is flat. There is no distension. Palpations: Abdomen is soft. Tenderness: There is no abdominal tenderness. There is no guarding.

## 2021-10-01 ENCOUNTER — TELEPHONE (OUTPATIENT)
Dept: FAMILY MEDICINE CLINIC | Age: 86
End: 2021-10-01

## 2021-10-01 ENCOUNTER — HOSPITAL ENCOUNTER (OUTPATIENT)
Age: 86
Discharge: HOME | End: 2021-10-01
Payer: MEDICARE

## 2021-10-01 DIAGNOSIS — K52.839 MICROSCOPIC COLITIS, UNSPECIFIED MICROSCOPIC COLITIS TYPE: ICD-10-CM

## 2021-10-01 DIAGNOSIS — R63.4 WEIGHT LOSS: ICD-10-CM

## 2021-10-01 DIAGNOSIS — R41.0 ACUTE CONFUSION: ICD-10-CM

## 2021-10-01 DIAGNOSIS — R19.7 DIARRHEA, UNSPECIFIED TYPE: Primary | ICD-10-CM

## 2021-10-01 DIAGNOSIS — R41.0 ACUTE CONFUSION: Primary | ICD-10-CM

## 2021-10-01 LAB
BACTERIA: ABNORMAL /HPF
CRYPTOSPORIDIUM ANTIGEN STOOL: NORMAL
E HISTOLYTICA ANTIGEN STOOL: NORMAL
GIARDIA ANTIGEN STOOL: NORMAL
HYALINE CASTS: ABNORMAL /LPF (ref 0–2)
MUCUS: ABNORMAL /LPF
RBC UA: ABNORMAL /HPF (ref 0–4)
URINE TYPE: ABNORMAL
WBC UA: ABNORMAL /HPF (ref 0–5)

## 2021-10-01 PROCEDURE — 81015 MICROSCOPIC EXAM OF URINE: CPT

## 2021-10-01 PROCEDURE — 87086 URINE CULTURE/COLONY COUNT: CPT

## 2021-10-01 NOTE — TELEPHONE ENCOUNTER
Yes, since she has had incontinent episodes of stool I think that's reasonable to get urine sample. I will place orders, she can come in today and give urine sample or go to hospital lab over the weekend. So far, we have results showing WBC in stool, one test is still in process otherwise she was negative for c. Diff.

## 2021-10-01 NOTE — TELEPHONE ENCOUNTER
Pt.'s daughter calling for results of stool and bldwk results from yesterday. Daughter is wondering if it would be a good idea to have her mom's urine tested.   States she is acting more confused than normal.

## 2021-10-01 NOTE — TELEPHONE ENCOUNTER
On Call Communication: Spoke with pt's daughter about urinalysis results. Will wait for culture to return on Monday before prescribing treatment as she is currently asymptomatic for UTI. Instructed the daughter to take her to an UC or ER over the weekend if she continues to become more confused or is having symptoms of dehydration. Daughter agreed with plan. Will send to PCP and Mirian as she saw her last week.

## 2021-10-02 LAB — URINE CULTURE, ROUTINE: NORMAL

## 2021-10-04 DIAGNOSIS — K52.839 MICROSCOPIC COLITIS, UNSPECIFIED MICROSCOPIC COLITIS TYPE: Primary | ICD-10-CM

## 2021-10-04 RX ORDER — BUDESONIDE 3 MG/1
9 CAPSULE, COATED PELLETS ORAL DAILY
Qty: 90 CAPSULE | Refills: 0 | Status: SHIPPED | OUTPATIENT
Start: 2021-10-04 | End: 2021-11-03

## 2021-10-06 ENCOUNTER — TELEPHONE (OUTPATIENT)
Dept: PRIMARY CARE CLINIC | Age: 86
End: 2021-10-06

## 2021-10-14 ENCOUNTER — TELEPHONE (OUTPATIENT)
Dept: PRIMARY CARE CLINIC | Age: 86
End: 2021-10-14

## 2021-11-01 ENCOUNTER — TELEPHONE (OUTPATIENT)
Dept: FAMILY MEDICINE CLINIC | Age: 86
End: 2021-11-01

## 2021-11-01 NOTE — TELEPHONE ENCOUNTER
----- Message from Steve Ellis sent at 11/1/2021  8:50 AM EDT -----  Subject: Message to Provider    QUESTIONS  Information for Provider? Patient's daughter, Chandan Hernandez, called to say that   her sister will be bringing Claudeen Book to her next appointment and to   disregard the note she left for Dr. Oliverio Clark. Any questions, call Chandan Gonzalezcindy at   [phone].  ---------------------------------------------------------------------------  --------------  Teresa Ye INFO  What is the best way for the office to contact you? OK to leave message on   voicemail  Preferred Call Back Phone Number? [phone]  ---------------------------------------------------------------------------  --------------  SCRIPT ANSWERS  Relationship to Patient? Third Party  Representative Name?  González Villa

## 2021-11-01 NOTE — TELEPHONE ENCOUNTER
Letter is scanned in the Media tab. Daughter Socorro said to disregard. Her sister [facility] is taking care of every thing.

## 2021-11-02 ENCOUNTER — OFFICE VISIT (OUTPATIENT)
Dept: FAMILY MEDICINE CLINIC | Age: 86
End: 2021-11-02
Payer: MEDICARE

## 2021-11-02 VITALS
DIASTOLIC BLOOD PRESSURE: 68 MMHG | BODY MASS INDEX: 20.73 KG/M2 | OXYGEN SATURATION: 100 % | WEIGHT: 117 LBS | SYSTOLIC BLOOD PRESSURE: 112 MMHG | HEART RATE: 64 BPM

## 2021-11-02 DIAGNOSIS — Z23 NEED FOR INFLUENZA VACCINATION: ICD-10-CM

## 2021-11-02 DIAGNOSIS — K52.839 MICROSCOPIC COLITIS, UNSPECIFIED MICROSCOPIC COLITIS TYPE: Primary | ICD-10-CM

## 2021-11-02 PROCEDURE — G0008 ADMIN INFLUENZA VIRUS VAC: HCPCS | Performed by: STUDENT IN AN ORGANIZED HEALTH CARE EDUCATION/TRAINING PROGRAM

## 2021-11-02 PROCEDURE — 90694 VACC AIIV4 NO PRSRV 0.5ML IM: CPT | Performed by: STUDENT IN AN ORGANIZED HEALTH CARE EDUCATION/TRAINING PROGRAM

## 2021-11-02 PROCEDURE — 99213 OFFICE O/P EST LOW 20 MIN: CPT | Performed by: STUDENT IN AN ORGANIZED HEALTH CARE EDUCATION/TRAINING PROGRAM

## 2021-11-02 RX ORDER — MESALAMINE 800 MG/1
TABLET, DELAYED RELEASE ORAL
COMMUNITY
Start: 2021-10-08 | End: 2022-03-24

## 2021-11-02 RX ORDER — LISINOPRIL 20 MG/1
TABLET ORAL
COMMUNITY
Start: 2021-09-03 | End: 2022-02-08

## 2021-11-02 NOTE — PROGRESS NOTES
Patient: Junior Foote is a 82 y.o. female who presents today with the following Chief Complaint(s):  Chief Complaint   Patient presents with    1 Month Follow-Up     diarrhea, weight loss,          HPI     Has established with Dr. Cale Bryant. [facility]. Started on mesalamine. Improving, still some loose stool. Has gained 2 lbs since new medication. Energy level is improving, memory improving, cooking and doing ADLs. Current Outpatient Medications   Medication Sig Dispense Refill    hydrOXYzine (ATARAX) 25 MG tablet TAKE 1 TABLET BY MOUTH ONCE DAILY AS NEEDED FOR ANXIETY 30 tablet 0    colestipol (COLESTID) 1 g tablet Take 1 tablet by mouth 2 times daily 60 tablet 5    atorvastatin (LIPITOR) 10 MG tablet Take 1 tablet by mouth once daily 90 tablet 2    colestipol (COLESTID) 1 g tablet Take 1 tablet by mouth 2 times daily 60 tablet 3    loperamide (IMODIUM A-D) 2 MG tablet Take 1 tablet by mouth 4 times daily as needed for Diarrhea 60 tablet 1    tiZANidine (ZANAFLEX) 2 MG tablet TAKE 1 TABLET BY MOUTH NIGHTLY AS NEEDED FOR MUSCLE SPASM 30 tablet 0    diclofenac sodium 1 % GEL Apply 4 g topically 4 times daily 2 Tube 5    Cholecalciferol (VITAMIN D) 2000 units CAPS capsule Take by mouth      Calcium Carbonate-Vit D-Min (CALCIUM 1200 PO) Take by mouth      aspirin 81 MG tablet Take 81 mg by mouth daily.  mesalamine (DELZICOL) 800 MG TBEC TBEC tablet       lisinopril (PRINIVIL;ZESTRIL) 20 MG tablet TAKE 1 TABLET BY MOUTH ONCE DAILY       No current facility-administered medications for this visit. Patient's past medical history, surgical history, family history, medications,  andallergies  were all reviewed and updated as appropriate today. Review of Systems  All other systems reviewed and negative    Physical Exam  Vitals reviewed. Constitutional:       Appearance: Normal appearance. HENT:      Head: Normocephalic and atraumatic.    Cardiovascular:      Rate and Rhythm: Normal rate and regular rhythm. Pulmonary:      Effort: Pulmonary effort is normal.      Breath sounds: Normal breath sounds. Abdominal:      General: There is no distension. Tenderness: There is no abdominal tenderness. Neurological:      General: No focal deficit present. Mental Status: She is alert and oriented to person, place, and time. Psychiatric:         Behavior: Behavior normal.         Thought Content: Thought content normal.       Vitals:    11/02/21 1328   BP: 112/68   Pulse: 64   SpO2: 100%       Assessment:  Encounter Diagnoses   Name Primary?  Microscopic colitis, unspecified microscopic colitis type Yes    Need for influenza vaccination        Plan:  1. Microscopic colitis, unspecified microscopic colitis type  Has been able to establish with [facility]. Now on mesalamine. Reports that symptoms are slowly improving and she has been able to have increase in activity level as well as energy level. Recommended continuing to follow with GI and continuing medication. 2. Need for influenza vaccination  - INFLUENZA, QUADV, ADJUVANTED, 65 YRS =, IM, PF, PREFILL SYR, 0.5ML (FLUAD)        No follow-ups on file.

## 2022-01-29 NOTE — TELEPHONE ENCOUNTER
.  Refill Request     Last Seen: Last Seen Department: 11/2/2021  Last Seen by PCP: 11/2/2021    Last Written: 5/11/21 90 with 2     Next Appointment:   Future Appointments   Date Time Provider Alejandra Castillo   3/10/2022  1:15 PM SCHEDULE, ALICE MCGHEE [place] - [place]       Requested Prescriptions     Pending Prescriptions Disp Refills    atorvastatin (LIPITOR) 10 MG tablet [Pharmacy Med Name: Atorvastatin Calcium 10 MG Oral Tablet] 90 tablet 0     Sig: Take 1 tablet by mouth once daily

## 2022-01-31 RX ORDER — ATORVASTATIN CALCIUM 10 MG/1
TABLET, FILM COATED ORAL
Qty: 90 TABLET | Refills: 0 | Status: SHIPPED | OUTPATIENT
Start: 2022-01-31 | End: 2022-06-20

## 2022-02-07 NOTE — TELEPHONE ENCOUNTER
Refill Request     Last Seen: Last Seen Department: 11/2/2021  Last Seen by PCP: 11/2/2021    Last Written: 9/3/2021    Next Appointment:   Future Appointments   Date Time Provider Alejandra Castillo   3/10/2022  1:15 PM SCHEDULE, ALICE [place] - [facility]       Requested Prescriptions     Pending Prescriptions Disp Refills    lisinopril (PRINIVIL;ZESTRIL) 20 MG tablet [Pharmacy Med Name: Lisinopril 20 MG Oral Tablet] 90 tablet 0     Sig: Take 1 tablet by mouth once daily

## 2022-02-08 RX ORDER — LISINOPRIL 20 MG/1
TABLET ORAL
Qty: 90 TABLET | Refills: 0 | Status: SHIPPED | OUTPATIENT
Start: 2022-02-08 | End: 2022-05-05

## 2022-02-18 ENCOUNTER — HOSPITAL ENCOUNTER (OUTPATIENT)
Age: 87
Discharge: HOME | End: 2022-02-18
Payer: MEDICARE

## 2022-02-18 LAB
ALBUMIN SERPL-MCNC: 4.3 G/DL (ref 3.4–5)
ANION GAP SERPL CALCULATED.3IONS-SCNC: 10 MMOL/L (ref 3–16)
BACTERIA: ABNORMAL /HPF
BILIRUBIN URINE: NEGATIVE
BLOOD, URINE: ABNORMAL
BUN BLDV-MCNC: 30 MG/DL (ref 7–20)
CALCIUM SERPL-MCNC: 9.6 MG/DL (ref 8.3–10.6)
CHLORIDE BLD-SCNC: 105 MMOL/L (ref 99–110)
CLARITY: CLEAR
CO2: 27 MMOL/L (ref 21–32)
COLOR: YELLOW
CREAT SERPL-MCNC: 1.1 MG/DL (ref 0.6–1.2)
EPITHELIAL CELLS, UA: ABNORMAL /HPF (ref 0–5)
GFR AFRICAN AMERICAN: 57
GFR NON-AFRICAN AMERICAN: 47
GLUCOSE BLD-MCNC: 98 MG/DL (ref 70–99)
GLUCOSE URINE: NEGATIVE MG/DL
HCT VFR BLD CALC: 30.8 % (ref 36–48)
HEMOGLOBIN: 10.4 G/DL (ref 12–16)
KETONES, URINE: NEGATIVE MG/DL
LEUKOCYTE ESTERASE, URINE: NEGATIVE
MCH RBC QN AUTO: 31.4 PG (ref 26–34)
MCHC RBC AUTO-ENTMCNC: 33.9 G/DL (ref 31–36)
MCV RBC AUTO: 92.8 FL (ref 80–100)
MICROSCOPIC EXAMINATION: YES
MUCUS: ABNORMAL /LPF
NITRITE, URINE: NEGATIVE
PDW BLD-RTO: 14.1 % (ref 12.4–15.4)
PH UA: 5.5 (ref 5–8)
PHOSPHORUS: 3.7 MG/DL (ref 2.5–4.9)
PLATELET # BLD: 161 K/UL (ref 135–450)
PMV BLD AUTO: 8.3 FL (ref 5–10.5)
POTASSIUM SERPL-SCNC: 4.1 MMOL/L (ref 3.5–5.1)
PROTEIN UA: NEGATIVE MG/DL
RBC # BLD: 3.32 M/UL (ref 4–5.2)
RBC UA: ABNORMAL /HPF (ref 0–4)
SODIUM BLD-SCNC: 142 MMOL/L (ref 136–145)
SPECIFIC GRAVITY UA: 1.02 (ref 1–1.03)
URINE TYPE: ABNORMAL
UROBILINOGEN, URINE: 0.2 E.U./DL
VITAMIN D 25-HYDROXY: 49.8 NG/ML
WBC # BLD: 4.5 K/UL (ref 4–11)
WBC UA: ABNORMAL /HPF (ref 0–5)

## 2022-02-18 PROCEDURE — 36415 COLL VENOUS BLD VENIPUNCTURE: CPT

## 2022-02-18 PROCEDURE — 82306 VITAMIN D 25 HYDROXY: CPT

## 2022-02-18 PROCEDURE — 80069 RENAL FUNCTION PANEL: CPT

## 2022-02-18 PROCEDURE — 81001 URINALYSIS AUTO W/SCOPE: CPT

## 2022-02-18 PROCEDURE — 85027 COMPLETE CBC AUTOMATED: CPT

## 2022-03-10 ENCOUNTER — TELEMEDICINE (OUTPATIENT)
Dept: FAMILY MEDICINE CLINIC | Age: 87
End: 2022-03-10
Payer: MEDICARE

## 2022-03-10 DIAGNOSIS — Z63.6 CAREGIVER STRESS: ICD-10-CM

## 2022-03-10 DIAGNOSIS — Z00.00 MEDICARE ANNUAL WELLNESS VISIT, SUBSEQUENT: Primary | ICD-10-CM

## 2022-03-10 PROCEDURE — G0439 PPPS, SUBSEQ VISIT: HCPCS | Performed by: STUDENT IN AN ORGANIZED HEALTH CARE EDUCATION/TRAINING PROGRAM

## 2022-03-10 RX ORDER — TIZANIDINE 2 MG/1
TABLET ORAL
Qty: 30 TABLET | Refills: 0 | Status: SHIPPED | OUTPATIENT
Start: 2022-03-10 | End: 2022-04-28

## 2022-03-10 RX ORDER — HYDROXYZINE HYDROCHLORIDE 25 MG/1
TABLET, FILM COATED ORAL
Qty: 30 TABLET | Refills: 0 | Status: SHIPPED | OUTPATIENT
Start: 2022-03-10

## 2022-03-10 SDOH — SOCIAL STABILITY - SOCIAL INSECURITY: DEPENDENT RELATIVE NEEDING CARE AT HOME: Z63.6

## 2022-03-10 ASSESSMENT — PATIENT HEALTH QUESTIONNAIRE - PHQ9
1. LITTLE INTEREST OR PLEASURE IN DOING THINGS: 1
SUM OF ALL RESPONSES TO PHQ QUESTIONS 1-9: 6
4. FEELING TIRED OR HAVING LITTLE ENERGY: 2
2. FEELING DOWN, DEPRESSED OR HOPELESS: 2
9. THOUGHTS THAT YOU WOULD BE BETTER OFF DEAD, OR OF HURTING YOURSELF: 0
SUM OF ALL RESPONSES TO PHQ QUESTIONS 1-9: 6
8. MOVING OR SPEAKING SO SLOWLY THAT OTHER PEOPLE COULD HAVE NOTICED. OR THE OPPOSITE, BEING SO FIGETY OR RESTLESS THAT YOU HAVE BEEN MOVING AROUND A LOT MORE THAN USUAL: 1
SUM OF ALL RESPONSES TO PHQ QUESTIONS 1-9: 6
7. TROUBLE CONCENTRATING ON THINGS, SUCH AS READING THE NEWSPAPER OR WATCHING TELEVISION: 0
5. POOR APPETITE OR OVEREATING: 0
SUM OF ALL RESPONSES TO PHQ9 QUESTIONS 1 & 2: 3
3. TROUBLE FALLING OR STAYING ASLEEP: 0
SUM OF ALL RESPONSES TO PHQ QUESTIONS 1-9: 6
6. FEELING BAD ABOUT YOURSELF - OR THAT YOU ARE A FAILURE OR HAVE LET YOURSELF OR YOUR FAMILY DOWN: 0
10. IF YOU CHECKED OFF ANY PROBLEMS, HOW DIFFICULT HAVE THESE PROBLEMS MADE IT FOR YOU TO DO YOUR WORK, TAKE CARE OF THINGS AT HOME, OR GET ALONG WITH OTHER PEOPLE: 0

## 2022-03-10 ASSESSMENT — LIFESTYLE VARIABLES: HOW OFTEN DO YOU HAVE A DRINK CONTAINING ALCOHOL: NEVER

## 2022-03-10 NOTE — PROGRESS NOTES
Medicare Annual Wellness Visit    Scot Lauren is here for Medicare AWV    Assessment & Plan   Medicare annual wellness visit, subsequent  Caregiver stress      Recommendations for Preventive Services Due: see orders and patient instructions/AVS.  Recommended screening schedule for the next 5-10 years is provided to the patient in written form: see Patient Instructions/AVS.     No follow-ups on file. Subjective       Patient's complete Health Risk Assessment and screening values have been reviewed and are found in Flowsheets. The following problems were reviewed today and where indicated follow up appointments were made and/or referrals ordered. Positive Risk Factor Screenings with Interventions:     Cognitive:   Words recalled: 0 Words Recalled  Clock Drawing Test (CDT): Normal (correctly spelled the word world backward)  Total Score Interpretation: Abnormal Mini-Cog    Cognitive Impairment Interventions:  · Patient advised to follow-up in this office for further evaluation and treatment within 2 week(s)    Depression:  PHQ-2 Score: 3  PHQ-9 Total Score: 6    Severity:1-4 = minimal depression, 5-9 = mild depression, 10-14 = moderate depression, 15-19 = moderately severe depression, 20-27 = severe depression    Depression Interventions:  · Daughter se up appt to bring patient for follow up in 2 weeks          General Health and ACP:  General  In general, how would you say your health is?: Very Good  In the past 7 days, have you experienced any of the following: New or Increased Pain, New or Increased Fatigue, Loneliness, Social Isolation, Stress or Anger?: No  Do you get the social and emotional support that you need?: Yes  Do you have a Living Will?: (!) No    Advance Directives     Power of  Living Will ACP-Advance Directive ACP-Power of     Not on File Not on File Filed [address] [facility] Sandeep Risk Interventions:  ·       Hearing/Vision:  Do you or your family notice any trouble with your hearing that hasn't been managed with hearing aids?: (!) Yes (has hearing aids but  doesnt wear)  Do you have difficulty driving, watching TV, or doing any of your daily activities because of your eyesight?: No  Have you had an eye exam within the past year?: Yes  No exam data present    Hearing/Vision Interventions:  · Hearing concerns:  patient declines any further evaluation/treatment for hearing issues     ADLs:  In the past 7 days, did you need help from others to perform any of the following everyday activities: Eating, dressing, grooming, bathing, toileting, or walking/balance?: No  In the past 7 days, did you need help from others to take care of any of the following: Laundry, housekeeping, banking/finances, shopping, telephone use, food preparation, transportation, or taking medications?: (!) Yes  Select all that apply: (!) Laundry,Housekeeping,Banking/Finances,Shopping,Food Preparation,Transportation,Taking Medications    ADL Interventions:  · daughters help  with daily needs and transportaton also gets meals on wheels          Objective      Patient-Reported Vitals  Patient-Reported Weight: 117lb  Patient-Reported Height: 5' 3\"        Patient does not monitor blood pressure at home. Allergies   Allergen Reactions    Codeine Hives    Toprol Xl [Metoprolol]      Prior to Visit Medications    Medication Sig Taking?  Authorizing Provider   lisinopril (PRINIVIL;ZESTRIL) 20 MG tablet Take 1 tablet by mouth once daily Yes Windy Brandon DO   atorvastatin (LIPITOR) 10 MG tablet Take 1 tablet by mouth once daily Yes Windy Brandon DO   hydrOXYzine (ATARAX) 25 MG tablet TAKE 1 TABLET BY MOUTH ONCE DAILY AS NEEDED FOR ANXIETY Yes Windy Brandon DO   diclofenac sodium 1 % GEL Apply 4 g topically 4 times daily Yes Courtney Jaffe MD   Cholecalciferol (VITAMIN D) 2000 units CAPS capsule Take by mouth Yes Historical Provider, MD   Calcium Carbonate-Vit D-Min (CALCIUM 1200 PO) Take by mouth Yes Historical Provider, MD   aspirin 81 MG tablet Take 81 mg by mouth daily. Yes Historical Provider, MD   mesalamine (DELZICOL) 800 MG TBEC TBEC tablet   Historical Provider, MD   colestipol (COLESTID) 1 g tablet Take 1 tablet by mouth 2 times daily  Patient not taking: Reported on 3/10/2022  Nikko Chacon MD   colestipol (COLESTID) 1 g tablet Take 1 tablet by mouth 2 times daily  Patient not taking: Reported on 3/10/2022  Neli Weber MD   loperamide (IMODIUM A-D) 2 MG tablet Take 1 tablet by mouth 4 times daily as needed for Diarrhea  Patient not taking: Reported on 3/10/2022  Dakota Brandon DO   tiZANidine (ZANAFLEX) 2 MG tablet TAKE 1 TABLET BY MOUTH NIGHTLY AS NEEDED FOR MUSCLE SPASM  Patient not taking: Reported on 3/10/2022  Geoff Hernandez DO       CareTeam (Including outside providers/suppliers regularly involved in providing care):   Patient Care Team:  Geoff Hernandez DO as PCP - General (Family Medicine)  Geoff Hernandez DO as PCP - [facility]ABILITATION [facility]    Reviewed and updated this visit:            Ramandeep Montero, was evaluated through a synchronous (real-time) audio-video encounter. The patient (or guardian if applicable) is aware that this is a billable service, which includes applicable co-pays. This Virtual Visit was conducted with patient's (and/or legal guardian's) consent. The visit was conducted pursuant to the emergency declaration under the [address] [place] [address] authority and the [employer] General Act. Patient identification was verified, and a caregiver was present when appropriate. The patient was located at home in a state where the provider was licensed to provide care. Jatin Daniels LPN, [id number]/02/[id number], performed the documented evaluation under the direct supervision of the attending physician.   This encounter was performed under Geoff welch DOs, direct supervision, 3/10/2022.

## 2022-03-24 ENCOUNTER — OFFICE VISIT (OUTPATIENT)
Dept: FAMILY MEDICINE CLINIC | Age: 87
End: 2022-03-24
Payer: MEDICARE

## 2022-03-24 VITALS — HEART RATE: 69 BPM | SYSTOLIC BLOOD PRESSURE: 118 MMHG | DIASTOLIC BLOOD PRESSURE: 66 MMHG | OXYGEN SATURATION: 100 %

## 2022-03-24 DIAGNOSIS — F32.1 CURRENT MODERATE EPISODE OF MAJOR DEPRESSIVE DISORDER WITHOUT PRIOR EPISODE (HCC): Primary | ICD-10-CM

## 2022-03-24 DIAGNOSIS — N18.32 STAGE 3B CHRONIC KIDNEY DISEASE (HCC): ICD-10-CM

## 2022-03-24 PROCEDURE — 99214 OFFICE O/P EST MOD 30 MIN: CPT | Performed by: STUDENT IN AN ORGANIZED HEALTH CARE EDUCATION/TRAINING PROGRAM

## 2022-03-24 ASSESSMENT — PATIENT HEALTH QUESTIONNAIRE - PHQ9
4. FEELING TIRED OR HAVING LITTLE ENERGY: 3
9. THOUGHTS THAT YOU WOULD BE BETTER OFF DEAD, OR OF HURTING YOURSELF: 0
5. POOR APPETITE OR OVEREATING: 1
7. TROUBLE CONCENTRATING ON THINGS, SUCH AS READING THE NEWSPAPER OR WATCHING TELEVISION: 1
2. FEELING DOWN, DEPRESSED OR HOPELESS: 2
10. IF YOU CHECKED OFF ANY PROBLEMS, HOW DIFFICULT HAVE THESE PROBLEMS MADE IT FOR YOU TO DO YOUR WORK, TAKE CARE OF THINGS AT HOME, OR GET ALONG WITH OTHER PEOPLE: 1
SUM OF ALL RESPONSES TO PHQ QUESTIONS 1-9: 13
SUM OF ALL RESPONSES TO PHQ QUESTIONS 1-9: 13
8. MOVING OR SPEAKING SO SLOWLY THAT OTHER PEOPLE COULD HAVE NOTICED. OR THE OPPOSITE, BEING SO FIGETY OR RESTLESS THAT YOU HAVE BEEN MOVING AROUND A LOT MORE THAN USUAL: 0
6. FEELING BAD ABOUT YOURSELF - OR THAT YOU ARE A FAILURE OR HAVE LET YOURSELF OR YOUR FAMILY DOWN: 2
SUM OF ALL RESPONSES TO PHQ QUESTIONS 1-9: 13
1. LITTLE INTEREST OR PLEASURE IN DOING THINGS: 2
SUM OF ALL RESPONSES TO PHQ9 QUESTIONS 1 & 2: 4
3. TROUBLE FALLING OR STAYING ASLEEP: 2
SUM OF ALL RESPONSES TO PHQ QUESTIONS 1-9: 13

## 2022-03-24 NOTE — PROGRESS NOTES
Patient: Niharika Mandujano is a 82 y.o. female who presents today with the following Chief Complaint(s):  Chief Complaint   Patient presents with    Depression    Other     getting a nerve block on her back with  soon and if it works they will do a catherization (info per daughter)          HPI     Depression  Believes she was treated for depression in the past, possibly when her mother passed in 2001. Took daily medicaiton but does not remember which one  Sleeping at night from 10 to 6am, then taking multiple naps during the day  Having a lot of stress with caring for spouse    Current Outpatient Medications   Medication Sig Dispense Refill    sertraline (ZOLOFT) 50 MG tablet Take 1 tablet by mouth daily 30 tablet 0    hydrOXYzine (ATARAX) 25 MG tablet TAKE 1 TABLET BY MOUTH ONCE DAILY AS NEEDED FOR ANXIETY 30 tablet 0    tiZANidine (ZANAFLEX) 2 MG tablet TAKE 1 TABLET BY MOUTH NIGHTLY AS NEEDED FOR MUSCLE SPASM 30 tablet 0    lisinopril (PRINIVIL;ZESTRIL) 20 MG tablet Take 1 tablet by mouth once daily 90 tablet 0    atorvastatin (LIPITOR) 10 MG tablet Take 1 tablet by mouth once daily 90 tablet 0    diclofenac sodium 1 % GEL Apply 4 g topically 4 times daily 2 Tube 5    Cholecalciferol (VITAMIN D) 2000 units CAPS capsule Take by mouth      Calcium Carbonate-Vit D-Min (CALCIUM 1200 PO) Take by mouth      aspirin 81 MG tablet Take 81 mg by mouth daily. No current facility-administered medications for this visit. Patient's past medical history, surgical history, family history, medications,  andallergies  were all reviewed and updated as appropriate today. Review of Systems  All other systems reviewed and negative    Physical Exam  Vitals reviewed. Constitutional:       Appearance: Normal appearance. HENT:      Head: Normocephalic and atraumatic. Cardiovascular:      Rate and Rhythm: Normal rate and regular rhythm.    Pulmonary:      Effort: Pulmonary effort is normal. Breath sounds: Normal breath sounds. Neurological:      General: No focal deficit present. Mental Status: She is alert and oriented to person, place, and time. Psychiatric:         Behavior: Behavior normal.         Thought Content: Thought content normal.           PHQ-9 Total Score: 13 (3/24/2022  2:08 PM)  Thoughts that you would be better off dead, or of hurting yourself in some way: 0 (3/24/2022  2:08 PM)      Vitals:    03/24/22 1340   BP: 118/66   Pulse: 69   SpO2: 100%       Assessment:  Encounter Diagnoses   Name Primary?  Stage 3b chronic kidney disease ([facility] [facility]     Current moderate episode of major depressive disorder without prior episode ([facility] [address] Yes       Plan:  1. Stage 3b chronic kidney disease ([facility] [address]  Last checked 2/18 and stable at 49    2. Current moderate episode of major depressive disorder without prior episode [facility])  Discussed multiple possible treatment options. Will start zoloft and follow up in 1 month  - sertraline (ZOLOFT) 50 MG tablet; Take 1 tablet by mouth daily  Dispense: 30 tablet; Refill: 0        No follow-ups on file.

## 2022-03-28 ENCOUNTER — PATIENT MESSAGE (OUTPATIENT)
Dept: FAMILY MEDICINE CLINIC | Age: 87
End: 2022-03-28

## 2022-03-28 DIAGNOSIS — M25.559 PAIN IN JOINT INVOLVING PELVIC REGION AND THIGH, UNSPECIFIED LATERALITY: Primary | ICD-10-CM

## 2022-03-28 DIAGNOSIS — M19.90 ARTHRITIS: ICD-10-CM

## 2022-03-28 NOTE — TELEPHONE ENCOUNTER
Ill let them know that they just need to  the prescription. Can you let me know if they need an appt?

## 2022-03-28 NOTE — TELEPHONE ENCOUNTER
From: Brant Powell  To: Dr. Evelin Gil  Sent: 3/28/2022 2:37 PM EDT  Subject: Handicap placard    Hello  I would like to get a handicap placard for the next 5 years. I can pick it up in a week. If I need to send the form, please let me know. Not sure if you have them there.   Thank you

## 2022-03-28 NOTE — LETTER
[address]  [facility] [address]  Phone: [phone]  Fax: [phone]    Joe Nava DO         April 7, 2022     Patient: Burke Negron   YOB: 1935   Date of Visit: 3/28/2022       To Whom It May Concern: It is my medical opinion that Eun Lee requires a disability parking placard for the following reasons:  She has limited walking ability due to an orthopedic condition. Duration of need: 1 years    If you have any questions or concerns, please don't hesitate to call.     Sincerely,        Joe Nava DO

## 2022-04-01 NOTE — TELEPHONE ENCOUNTER
I see the phone call documenting this but can not see the reason that was used.  Are you able to forward this to me, I am sorry I can not find it in media

## 2022-04-01 NOTE — TELEPHONE ENCOUNTER
I do not see where I have written then before. Are you able to find this? If not then they need appointment to discuss handicap placard and reason for placard.

## 2022-04-01 NOTE — TELEPHONE ENCOUNTER
Please advise, I see that Nickie Rodriguez previously completed handicap placard in 2020, would you like OV to  discuss?

## 2022-04-04 NOTE — TELEPHONE ENCOUNTER
This was the most recent handicap placard in her chart. I have also printed this off if you need a physical copy to refer to. 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, [address] [place] [address]   Phone:  [phone]  Fax:  [phone]

## 2022-04-13 DIAGNOSIS — F32.1 CURRENT MODERATE EPISODE OF MAJOR DEPRESSIVE DISORDER WITHOUT PRIOR EPISODE (HCC): ICD-10-CM

## 2022-04-13 NOTE — TELEPHONE ENCOUNTER
Refill Request     Last Seen: Last Seen Department: 3/24/2022  Last Seen by PCP: 3/24/2022    Last Written: 3/24/22 30 tablet  0 refill       Next Appointment: 4/28/22     Future Appointments   Date Time Provider Alejandra Castillo   4/28/2022  2:30 PM DO SIRIA Canales [place] [facility]       Future appointment scheduled      Requested Prescriptions     Pending Prescriptions Disp Refills    sertraline (ZOLOFT) 50 MG tablet 90 tablet 1     Sig: Take 1 tablet by mouth daily     PT Has appt scheduled.  pts daughter states she is doing well on medication and pt is needing a refill until her next appointment

## 2022-04-14 DIAGNOSIS — F32.1 CURRENT MODERATE EPISODE OF MAJOR DEPRESSIVE DISORDER WITHOUT PRIOR EPISODE (HCC): ICD-10-CM

## 2022-04-14 NOTE — TELEPHONE ENCOUNTER
Refill Request     Last Seen: Last Seen Department: 3/24/2022  Last Seen by PCP: 3/24/2022    Last Written: 4/13/22 90 tablet 1 refill     Next Appointment: 4/28/22     Future Appointments   Date Time Provider Alejandra Castillo   4/28/2022  2:30 PM DO SIRIA Caro  [place] [facility]       Future appointment scheduled      Requested Prescriptions     Pending Prescriptions Disp Refills    sertraline (ZOLOFT) 50 MG tablet 90 tablet 1     Sig: Take 1 tablet by mouth daily

## 2022-04-28 ENCOUNTER — OFFICE VISIT (OUTPATIENT)
Dept: FAMILY MEDICINE CLINIC | Age: 87
End: 2022-04-28
Payer: MEDICARE

## 2022-04-28 VITALS
WEIGHT: 119 LBS | HEART RATE: 67 BPM | HEIGHT: 61 IN | DIASTOLIC BLOOD PRESSURE: 68 MMHG | BODY MASS INDEX: 22.47 KG/M2 | SYSTOLIC BLOOD PRESSURE: 110 MMHG | TEMPERATURE: 98.5 F | OXYGEN SATURATION: 96 %

## 2022-04-28 DIAGNOSIS — F32.1 CURRENT MODERATE EPISODE OF MAJOR DEPRESSIVE DISORDER WITHOUT PRIOR EPISODE (HCC): ICD-10-CM

## 2022-04-28 DIAGNOSIS — N18.31 STAGE 3A CHRONIC KIDNEY DISEASE (HCC): ICD-10-CM

## 2022-04-28 DIAGNOSIS — J30.89 NON-SEASONAL ALLERGIC RHINITIS, UNSPECIFIED TRIGGER: Primary | ICD-10-CM

## 2022-04-28 DIAGNOSIS — M47.816 SPONDYLOSIS OF LUMBAR REGION WITHOUT MYELOPATHY OR RADICULOPATHY: ICD-10-CM

## 2022-04-28 DIAGNOSIS — Z12.31 ENCOUNTER FOR SCREENING MAMMOGRAM FOR MALIGNANT NEOPLASM OF BREAST: ICD-10-CM

## 2022-04-28 PROCEDURE — 99213 OFFICE O/P EST LOW 20 MIN: CPT | Performed by: STUDENT IN AN ORGANIZED HEALTH CARE EDUCATION/TRAINING PROGRAM

## 2022-04-28 RX ORDER — TIZANIDINE 2 MG/1
2 TABLET ORAL EVERY 8 HOURS PRN
Qty: 30 TABLET | Refills: 0 | Status: SHIPPED | OUTPATIENT
Start: 2022-04-28

## 2022-04-28 RX ORDER — FLUTICASONE PROPIONATE 50 MCG
1 SPRAY, SUSPENSION (ML) NASAL DAILY
Qty: 16 G | Refills: 0 | Status: SHIPPED | OUTPATIENT
Start: 2022-04-28 | End: 2022-06-17 | Stop reason: SDUPTHER

## 2022-04-28 ASSESSMENT — PATIENT HEALTH QUESTIONNAIRE - PHQ9
1. LITTLE INTEREST OR PLEASURE IN DOING THINGS: 2
SUM OF ALL RESPONSES TO PHQ QUESTIONS 1-9: 8
7. TROUBLE CONCENTRATING ON THINGS, SUCH AS READING THE NEWSPAPER OR WATCHING TELEVISION: 1
SUM OF ALL RESPONSES TO PHQ QUESTIONS 1-9: 8
8. MOVING OR SPEAKING SO SLOWLY THAT OTHER PEOPLE COULD HAVE NOTICED. OR THE OPPOSITE, BEING SO FIGETY OR RESTLESS THAT YOU HAVE BEEN MOVING AROUND A LOT MORE THAN USUAL: 1
5. POOR APPETITE OR OVEREATING: 1
3. TROUBLE FALLING OR STAYING ASLEEP: 0
10. IF YOU CHECKED OFF ANY PROBLEMS, HOW DIFFICULT HAVE THESE PROBLEMS MADE IT FOR YOU TO DO YOUR WORK, TAKE CARE OF THINGS AT HOME, OR GET ALONG WITH OTHER PEOPLE: 1
SUM OF ALL RESPONSES TO PHQ QUESTIONS 1-9: 8
2. FEELING DOWN, DEPRESSED OR HOPELESS: 1
4. FEELING TIRED OR HAVING LITTLE ENERGY: 1
9. THOUGHTS THAT YOU WOULD BE BETTER OFF DEAD, OR OF HURTING YOURSELF: 0
SUM OF ALL RESPONSES TO PHQ9 QUESTIONS 1 & 2: 3
SUM OF ALL RESPONSES TO PHQ QUESTIONS 1-9: 8
6. FEELING BAD ABOUT YOURSELF - OR THAT YOU ARE A FAILURE OR HAVE LET YOURSELF OR YOUR FAMILY DOWN: 1

## 2022-04-28 NOTE — PROGRESS NOTES
Patient: Ashleigh Sazn is a 82 y.o. female who presents today with the following Chief Complaint(s):  Chief Complaint   Patient presents with    Discuss Medications     muscle relaxer, anti depressant    Diarrhea     will contact GI x3 weeks         HPI     Depression  Started zoloft 50mg at last visit  Daughter and patient feel that this is significantly improved mood. Following with pain management for back pain. Planning for nerve ablation of back  Would like to restart muscle relaxers as needed. History of recurrent diarrhea. Follows with GI. Plan to follow-up with them soon. Current Outpatient Medications   Medication Sig Dispense Refill    tiZANidine (ZANAFLEX) 2 MG tablet Take 1 tablet by mouth every 8 hours as needed (muscle spasm) 30 tablet 0    fluticasone (FLONASE) 50 MCG/ACT nasal spray 1 spray by Each Nostril route daily 16 g 0    sertraline (ZOLOFT) 50 MG tablet Take 1 tablet by mouth daily 90 tablet 1    Handicap Placard MISC by Does not apply route 04/07/22 1 each 0    hydrOXYzine (ATARAX) 25 MG tablet TAKE 1 TABLET BY MOUTH ONCE DAILY AS NEEDED FOR ANXIETY 30 tablet 0    lisinopril (PRINIVIL;ZESTRIL) 20 MG tablet Take 1 tablet by mouth once daily 90 tablet 0    atorvastatin (LIPITOR) 10 MG tablet Take 1 tablet by mouth once daily 90 tablet 0    diclofenac sodium 1 % GEL Apply 4 g topically 4 times daily 2 Tube 5    Cholecalciferol (VITAMIN D) 2000 units CAPS capsule Take by mouth      Calcium Carbonate-Vit D-Min (CALCIUM 1200 PO) Take by mouth      aspirin 81 MG tablet Take 81 mg by mouth daily. No current facility-administered medications for this visit. Patient's past medical history, surgical history, family history, medications,  andallergies  were all reviewed and updated as appropriate today. Review of Systems  All other systems reviewed and negative    Physical Exam  Vitals reviewed. Constitutional:       Appearance: Normal appearance.    HENT: Head: Normocephalic and atraumatic. Cardiovascular:      Rate and Rhythm: Normal rate and regular rhythm. Pulmonary:      Effort: Pulmonary effort is normal.      Breath sounds: Normal breath sounds. Neurological:      General: No focal deficit present. Mental Status: She is alert and oriented to person, place, and time. Psychiatric:         Behavior: Behavior normal.         Thought Content: Thought content normal.     PHQ-9 Total Score: 8 (4/28/2022  2:56 PM)  Thoughts that you would be better off dead, or of hurting yourself in some way: 0 (4/28/2022  2:56 PM)      Vitals:    04/28/22 1430   BP: 110/68   Pulse: 67   Temp: 98.5 °F (36.9 °C)   SpO2: 96%       Assessment:  Encounter Diagnoses   Name Primary?  Non-seasonal allergic rhinitis, unspecified trigger Yes    Spondylosis of lumbar region without myelopathy or radiculopathy     Encounter for screening mammogram for malignant neoplasm of breast     Current moderate episode of major depressive disorder without prior episode ([facility] [address]     Stage 3a chronic kidney disease ([facility] [address]        Plan:  4. Current moderate episode of major depressive disorder without prior episode (HCC)  Significant improvement in PHQ 9. We will continue on current dose of 50 mg daily    Spondylosis of lumbar region without myelopathy or radiculopathy  Discussed risks of muscle relaxers with patient and daughter. Will use sparingly. - tiZANidine (ZANAFLEX) 2 MG tablet; Take 1 tablet by mouth every 8 hours as needed (muscle spasm)  Dispense: 30 tablet; Refill: 0    1. Non-seasonal allergic rhinitis, unspecified trigger  Reports improvement on Flonase previously. Will restart  - fluticasone (FLONASE) 50 MCG/ACT nasal spray; 1 spray by Each Nostril route daily  Dispense: 16 g; Refill: 0    3. Encounter for screening mammogram for malignant neoplasm of breast  - DERIAN DIGITAL SCREEN W OR WO CAD BILATERAL; Future    5.  Stage 3a chronic kidney disease (HCC)  Stable on most recent lab work. No follow-ups on file.

## 2022-05-05 RX ORDER — LISINOPRIL 20 MG/1
TABLET ORAL
Qty: 90 TABLET | Refills: 1 | Status: SHIPPED | OUTPATIENT
Start: 2022-05-05 | End: 2022-10-17

## 2022-05-05 NOTE — TELEPHONE ENCOUNTER
Refill Request     CONFIRM preferrred pharmacy with the patient. If Mail Order Rx - Pend for 90 day refill. Last Seen: Last Seen Department: 4/28/2022  Last Seen by PCP: 4/28/2022    Last Written: 02/08/2022 90 Tablet 0 Refills    Next Appointment:   No future appointments.     Requested Prescriptions     Pending Prescriptions Disp Refills    lisinopril (PRINIVIL;ZESTRIL) 20 MG tablet [Pharmacy Med Name: Lisinopril 20 MG Oral Tablet] 90 tablet 0     Sig: Take 1 tablet by mouth once daily

## 2022-05-27 ENCOUNTER — APPOINTMENT (OUTPATIENT)
Dept: CT IMAGING | Age: 87
End: 2022-05-27
Payer: MEDICARE

## 2022-05-27 ENCOUNTER — HOSPITAL ENCOUNTER (EMERGENCY)
Age: 87
Discharge: HOME | End: 2022-05-27
Attending: STUDENT IN AN ORGANIZED HEALTH CARE EDUCATION/TRAINING PROGRAM
Payer: MEDICARE

## 2022-05-27 VITALS
HEIGHT: 61 IN | RESPIRATION RATE: 14 BRPM | TEMPERATURE: 98 F | BODY MASS INDEX: 22.47 KG/M2 | OXYGEN SATURATION: 100 % | SYSTOLIC BLOOD PRESSURE: 118 MMHG | DIASTOLIC BLOOD PRESSURE: 56 MMHG | WEIGHT: 119 LBS | HEART RATE: 56 BPM

## 2022-05-27 DIAGNOSIS — S09.8XXA BLUNT HEAD TRAUMA, INITIAL ENCOUNTER: Primary | ICD-10-CM

## 2022-05-27 PROCEDURE — 70450 CT HEAD/BRAIN W/O DYE: CPT

## 2022-05-27 PROCEDURE — 72125 CT NECK SPINE W/O DYE: CPT

## 2022-05-27 PROCEDURE — 99284 EMERGENCY DEPT VISIT MOD MDM: CPT

## 2022-05-27 ASSESSMENT — PAIN - FUNCTIONAL ASSESSMENT
PAIN_FUNCTIONAL_ASSESSMENT: ACTIVITIES ARE NOT PREVENTED
PAIN_FUNCTIONAL_ASSESSMENT: 0-10

## 2022-05-27 ASSESSMENT — PAIN DESCRIPTION - DESCRIPTORS: DESCRIPTORS: PATIENT UNABLE TO DESCRIBE

## 2022-05-27 ASSESSMENT — PAIN DESCRIPTION - ONSET: ONSET: ON-GOING

## 2022-05-27 ASSESSMENT — PAIN DESCRIPTION - FREQUENCY: FREQUENCY: CONTINUOUS

## 2022-05-27 ASSESSMENT — PAIN DESCRIPTION - LOCATION: LOCATION: HEAD;HAND

## 2022-05-27 ASSESSMENT — PAIN SCALES - GENERAL: PAINLEVEL_OUTOF10: 3

## 2022-05-27 ASSESSMENT — PAIN DESCRIPTION - PAIN TYPE: TYPE: ACUTE PAIN

## 2022-05-27 ASSESSMENT — PAIN DESCRIPTION - ORIENTATION: ORIENTATION: LEFT

## 2022-05-27 NOTE — Clinical Note
Nani Traore was seen and treated in our emergency department on 5/27/2022. She may return to work on 05/28/2022. If you have any questions or concerns, please don't hesitate to call.       Clarke Styles, DO

## 2022-05-27 NOTE — ED PROVIDER NOTES
[address]  Head Injury       HISTORY OF PRESENT ILLNESS  Antoinette Alegria is a 82 y.o. female  who presents to the ED complaining of a fall last night in which she hit her head. Patient states that she lost her balance and fell onto her right side. She has a skin tear to the left hand and bruising around the right eye. Denies any loss of consciousness. Denies any chest pain or shortness of breath or dizziness prior to the fall. States her last tetanus was very recent. No other complaints, modifying factors or associated symptoms. I have reviewed the following from the nursing documentation.     Past Medical History:   Diagnosis Date    Arthritis     Chicken pox     Chills     CKD (chronic kidney disease) stage 3, GFR 30-59 ml/min ([facility]) 1/10/2019    Current moderate episode of major depressive disorder without prior episode ([facility] [address] 4/28/2022    Fever     Hip pain     Hyperlipidemia     Hypertension     Measles     Miscarriage     Osteoarthritis     Pneumonia     Poor circulation     Ringing in ears     Sacroiliitis ([facility] [address] 4/15/2016    Sensorineural hearing loss, bilateral 10/20/2020    Swelling of both ankles     Tonsillitis      Past Surgical History:   Procedure Laterality Date    COLONOSCOPY  11/09/2016    normal/ hemmoroids    COLONOSCOPY N/A 5/3/2021    COLONOSCOPY WITH BIOPSY performed by Danette Gay MD at [facility] [address] WITH BIOPSY (N/A )    SHOULDER SURGERY       Family History   Problem Relation Age of Onset    Heart Disease Mother     Stroke Mother     High Blood Pressure Mother     Arthritis Mother     High Blood Pressure Brother      Social History     Socioeconomic History    Marital status:      Spouse name: Not on file    Number of children: Not on file    Years of education: Not on file    Highest education level: Not on file   Occupational History    Not (FLONASE) 50 MCG/ACT nasal spray 1 spray by Each Nostril route daily 16 g 0    sertraline (ZOLOFT) 50 MG tablet Take 1 tablet by mouth daily 90 tablet 1    Handicap Placard MISC by Does not apply route 04/07/22 1 each 0    hydrOXYzine (ATARAX) 25 MG tablet TAKE 1 TABLET BY MOUTH ONCE DAILY AS NEEDED FOR ANXIETY 30 tablet 0    atorvastatin (LIPITOR) 10 MG tablet Take 1 tablet by mouth once daily 90 tablet 0    diclofenac sodium 1 % GEL Apply 4 g topically 4 times daily 2 Tube 5    Cholecalciferol (VITAMIN D) 2000 units CAPS capsule Take by mouth      Calcium Carbonate-Vit D-Min (CALCIUM 1200 PO) Take by mouth      aspirin 81 MG tablet Take 81 mg by mouth daily. Allergies   Allergen Reactions    Codeine Hives    Toprol Xl [Metoprolol]        REVIEW OF SYSTEMS  10 systems reviewed, pertinent positives per HPI otherwise noted to be negative. PHYSICAL EXAM  BP (!) 118/56   Pulse 56   Temp 98 °F (36.7 °C) (Oral)   Resp 14   Ht 5' 1\" (1.549 m)   Wt 119 lb (54 kg)   SpO2 100%   BMI 22.48 kg/m²    General: Appears well. Alert  HEENT: Ecchymosis and abrasion over the lateral aspect of the right orbit, Eyes normal inspection, PERRL. EOMI. Normal ENT inspection, no hemotympanum, no nasal septal hematoma, pharynx normal. No signs of dehydration  NECK: Normal inspection, no midline spinal tenderness  RESPIRATORY: Normal breath sounds. No chest wall tenderness. No respiratory distress  CVS: Heart rate and rhythm regular. No Murmurs  ABDOMEN/GI: Soft, Non-tender, No distention  BACK: Normal inspection  EXTREMITIES: Skin tear of the left hand. Non-Tender. Full ROM. Normal appearance. No Pedal edema  NEURO: Alert and oriented. Sensation normal. Motor normal  PSYCH: Mood normal. Affect normal.  SKIN: Color normal. No rash. Warm, Dry    RADIOLOGY  CT Cervical Spine WO Contrast   Final Result   No acute abnormality of the cervical spine.          CT Head WO Contrast   Final Result   No acute intracranial abnormality. ED COURSE/MDM  Patient seen and evaluated. Old records reviewed. Labs and imaging reviewed and results discussed with patient. CT head and C-spine obtained and shows no acute fracture or intracranial abnormality. She does have a skin tear of the left hand, which is cleansed and bandaged. Patient is reassured and she is ambulating well in the ED. She is discharged home with follow-up to her PCP in 2 to 3 days as needed. Is this patient to be included in the SEP-1 Core Measure due to severe sepsis or septic shock? No   Exclusion criteria - the patient is NOT to be included for SEP-1 Core Measure due to: Infection is not suspected    During the patient's ED course, the patient was given:  Medications - No data to display     CLINICAL IMPRESSION  No diagnosis found. Blood pressure (!) 118/56, pulse 56, temperature 98 °F (36.7 °C), temperature source Oral, resp. rate 14, height 5' 1\" (1.549 m), weight 119 lb (54 kg), SpO2 100 %, not currently breastfeeding. DISPOSITION  Cindi Grey was discharged to home in stable condition. Patient was given scripts for the following medications. I counseled patient how to take these medications. Discharge Medication List as of 5/27/2022  3:00 PM          Follow-up with:  Fátima Irby DO  [facility]  [phone]    Schedule an appointment as soon as possible for a visit in 3 days  Follow up within 3 days, Return to ED sooner if symptoms worsen      DISCLAIMER: This chart was created using Dragon dictation software. Efforts were made by me to ensure accuracy, however some errors may be present due to limitations of this technology and occasionally words are not transcribed correctly.      Davina Kapoor DO  05/27/22 8323

## 2022-05-27 NOTE — ED NOTES
AVS provided and reviewed with the patient. The patient verbalized understanding of care at home, follow up care, and emergent symptoms to return for. No questions or concerns verbalized at this time. The patient is alert, oriented, stable, and assisted out of the department via wheelchair at the time of discharge.        Ney Rose RN  05/27/22 9275

## 2022-06-02 ENCOUNTER — HOSPITAL ENCOUNTER (OUTPATIENT)
Dept: WOMENS IMAGING | Age: 87
Discharge: HOME | End: 2022-06-02
Payer: MEDICARE

## 2022-06-02 DIAGNOSIS — Z12.31 ENCOUNTER FOR SCREENING MAMMOGRAM FOR MALIGNANT NEOPLASM OF BREAST: ICD-10-CM

## 2022-06-02 PROCEDURE — 77067 SCR MAMMO BI INCL CAD: CPT

## 2022-06-13 ENCOUNTER — NURSE TRIAGE (OUTPATIENT)
Dept: OTHER | Facility: CLINIC | Age: 87
End: 2022-06-13

## 2022-06-13 NOTE — TELEPHONE ENCOUNTER
Received call from Marialuisa at [facility] with Red Flag Complaint. Subjective: Caller states \"My Mom has been having fatigue for the last month and I think it's getting worse. \"     Current Symptoms: Fatigue    Onset: One month    Associated Symptoms: N/A    Pain Severity: None per daughter    Temperature: Denies    What has been tried: Per daughter patient was anemic in April    Recommended disposition: See PCP within 3 Days    Care advice provided, patient verbalizes understanding; denies any other questions or concerns; instructed to call back for any new or worsening symptoms. Patient/Caller agrees with recommended disposition; writer provided warm transfer to Ricci Zuleta at [facility] for appointment scheduling     Attention Provider: Thank you for allowing me to participate in the care of your patient. The patient was connected to triage in response to information provided to the ECC/PSC. Please do not respond through this encounter as the response is not directed to a shared pool.     Reason for Disposition   Fatigue (i.e., tires easily, decreased energy) and persists > 1 week    Protocols used: WEAKNESS (GENERALIZED) AND FATIGUE-ADULT-[place] 83 year old male with PMHx of COPD, Interstitial lung disease, BPH presenting due to cough of 1 week duration.     #Failure to thrive with Severe Malnutrition  Seen by S&S on previous admission: Dysphagia 1  NG tube placed as per family request   Son states he will give decision about PEG vs comfort care tomorrow in AM   Replete lytes PRN     #Cough, fever, likely due to Aspiration   NG tube placed   off Abx     #COPD, with  Intersitial lung disease  CXR showing evidence of disease progression  Currently not wheezing, not in exacerbation  Duonebs q6 PRN    #Folic acid deficiency  On Folic acid supplement     DVT PPX: Lovenox  GI PPX: Not indicated  Activity: With assistance  Diet: tube feeds   Dispo: From home, lives with son  Full code 83 year old male with PMHx of COPD, Interstitial lung disease, BPH presenting due to cough of 1 week duration.     #Failure to thrive with Severe Malnutrition  Seen by S&S on previous admission: Dysphagia 1  NG tube placed as per family request   Son states he will give decision about PEG vs comfort care tomorrow in AM   Replete lytes PRN     #Cough, fever, likely due to Aspiration   NG tube placed   off Abx     #COPD, with  Intersitial lung disease  CXR showing evidence of disease progression  Currently not wheezing, not in exacerbation  Duonebs q6 PRN    #Folic acid deficiency  On Folic acid supplement     DVT PPX: Lovenox  GI PPX: Not indicated  Activity: With assistance  Diet: tube feeds   Dispo: From home, lives with son Lizbet [phone]  Full code 83 year old male with PMHx of COPD, Interstitial lung disease, BPH presenting due to cough of 1 week duration.     #Failure to thrive with Severe Malnutrition  Seen by S&S on previous admission: Dysphagia 1  NG tube placed as per family request   Son states he will give decision about PEG vs comfort care tomorrow in AM   Replete lytes PRN     #Cough, fever, likely due to Aspiration   NG tube placed   off Abx     #COPD, with  Intersitial lung disease  CXR showing evidence of disease progression  Currently not wheezing, not in exacerbation  Duonebs q6 PRN    d/w the intern and resident in detail  pts family must decide re ?PEG tube or comfort measures over the next 24 hrs otherwise the pt will be discharged home    #Folic acid deficiency  On Folic acid supplement     DVT PPX: Lovenox  GI PPX: Not indicated  Activity: With assistance  Diet: tube feeds   Dispo: From home, lives with abeba Somers [phone]  Full code

## 2022-06-17 ENCOUNTER — OFFICE VISIT (OUTPATIENT)
Dept: FAMILY MEDICINE CLINIC | Age: 87
End: 2022-06-17
Payer: MEDICARE

## 2022-06-17 VITALS
BODY MASS INDEX: 21.16 KG/M2 | DIASTOLIC BLOOD PRESSURE: 70 MMHG | SYSTOLIC BLOOD PRESSURE: 110 MMHG | OXYGEN SATURATION: 97 % | WEIGHT: 112 LBS | HEART RATE: 67 BPM

## 2022-06-17 DIAGNOSIS — I10 ESSENTIAL HYPERTENSION: Primary | ICD-10-CM

## 2022-06-17 DIAGNOSIS — E55.9 VITAMIN D DEFICIENCY: ICD-10-CM

## 2022-06-17 DIAGNOSIS — E78.2 MIXED HYPERLIPIDEMIA: ICD-10-CM

## 2022-06-17 DIAGNOSIS — R53.83 OTHER FATIGUE: ICD-10-CM

## 2022-06-17 DIAGNOSIS — D64.9 NORMOCYTIC ANEMIA: ICD-10-CM

## 2022-06-17 DIAGNOSIS — R41.3 MEMORY LOSS: ICD-10-CM

## 2022-06-17 DIAGNOSIS — J30.89 NON-SEASONAL ALLERGIC RHINITIS, UNSPECIFIED TRIGGER: ICD-10-CM

## 2022-06-17 DIAGNOSIS — N18.31 STAGE 3A CHRONIC KIDNEY DISEASE (HCC): ICD-10-CM

## 2022-06-17 PROBLEM — N18.30 CHRONIC RENAL DISEASE, STAGE III (HCC): Status: ACTIVE | Noted: 2022-06-17

## 2022-06-17 LAB
A/G RATIO: 1.6 (ref 1.1–2.2)
ALBUMIN SERPL-MCNC: 4.1 G/DL (ref 3.4–5)
ALP BLD-CCNC: 50 U/L (ref 40–129)
ALT SERPL-CCNC: 17 U/L (ref 10–40)
ANION GAP SERPL CALCULATED.3IONS-SCNC: 14 MMOL/L (ref 3–16)
AST SERPL-CCNC: 23 U/L (ref 15–37)
BASOPHILS ABSOLUTE: 0 K/UL (ref 0–0.2)
BASOPHILS RELATIVE PERCENT: 0.6 %
BILIRUB SERPL-MCNC: <0.2 MG/DL (ref 0–1)
BUN BLDV-MCNC: 31 MG/DL (ref 7–20)
CALCIUM SERPL-MCNC: 9.7 MG/DL (ref 8.3–10.6)
CHLORIDE BLD-SCNC: 103 MMOL/L (ref 99–110)
CHOLESTEROL, TOTAL: 166 MG/DL (ref 0–199)
CO2: 21 MMOL/L (ref 21–32)
CREAT SERPL-MCNC: 1.3 MG/DL (ref 0.6–1.2)
EOSINOPHILS ABSOLUTE: 0.1 K/UL (ref 0–0.6)
EOSINOPHILS RELATIVE PERCENT: 1 %
FERRITIN: 350.3 NG/ML (ref 15–150)
FOLATE: >20 NG/ML (ref 4.78–24.2)
GFR AFRICAN AMERICAN: 47
GFR NON-AFRICAN AMERICAN: 39
GLUCOSE BLD-MCNC: 97 MG/DL (ref 70–99)
HCT VFR BLD CALC: 30.5 % (ref 36–48)
HDLC SERPL-MCNC: 63 MG/DL (ref 40–60)
HEMOGLOBIN: 10.1 G/DL (ref 12–16)
IRON SATURATION: 37 % (ref 15–50)
IRON: 89 UG/DL (ref 37–145)
LDL CHOLESTEROL CALCULATED: 76 MG/DL
LYMPHOCYTES ABSOLUTE: 1.5 K/UL (ref 1–5.1)
LYMPHOCYTES RELATIVE PERCENT: 25.6 %
MCH RBC QN AUTO: 30.6 PG (ref 26–34)
MCHC RBC AUTO-ENTMCNC: 33 G/DL (ref 31–36)
MCV RBC AUTO: 92.9 FL (ref 80–100)
MONOCYTES ABSOLUTE: 0.5 K/UL (ref 0–1.3)
MONOCYTES RELATIVE PERCENT: 7.7 %
NEUTROPHILS ABSOLUTE: 3.8 K/UL (ref 1.7–7.7)
NEUTROPHILS RELATIVE PERCENT: 65.1 %
PDW BLD-RTO: 15.9 % (ref 12.4–15.4)
PLATELET # BLD: 225 K/UL (ref 135–450)
PMV BLD AUTO: 8.3 FL (ref 5–10.5)
POTASSIUM SERPL-SCNC: 4.6 MMOL/L (ref 3.5–5.1)
RBC # BLD: 3.29 M/UL (ref 4–5.2)
SODIUM BLD-SCNC: 138 MMOL/L (ref 136–145)
TOTAL IRON BINDING CAPACITY: 240 UG/DL (ref 260–445)
TOTAL PROTEIN: 6.7 G/DL (ref 6.4–8.2)
TRIGL SERPL-MCNC: 136 MG/DL (ref 0–150)
TSH REFLEX: 2.42 UIU/ML (ref 0.27–4.2)
VITAMIN B-12: 600 PG/ML (ref 211–911)
VITAMIN D 25-HYDROXY: 52.1 NG/ML
VLDLC SERPL CALC-MCNC: 27 MG/DL
WBC # BLD: 5.9 K/UL (ref 4–11)

## 2022-06-17 PROCEDURE — 99214 OFFICE O/P EST MOD 30 MIN: CPT | Performed by: FAMILY MEDICINE

## 2022-06-17 PROCEDURE — 1123F ACP DISCUSS/DSCN MKR DOCD: CPT | Performed by: FAMILY MEDICINE

## 2022-06-17 RX ORDER — FLUTICASONE PROPIONATE 50 MCG
1 SPRAY, SUSPENSION (ML) NASAL DAILY
Qty: 16 G | Refills: 5 | Status: SHIPPED | OUTPATIENT
Start: 2022-06-17 | End: 2022-10-18 | Stop reason: SDUPTHER

## 2022-06-17 ASSESSMENT — ENCOUNTER SYMPTOMS: COUGH: 0

## 2022-06-17 NOTE — PROGRESS NOTES
Natalie Nichole is a 82 y.o. female    Chief Complaint   Patient presents with    Fatigue     Weak- been sleeping alot lately. Sometimes feels unsteady when walking.  Other     Abaltion in her back- which helps with pain     Hypertension    Hyperlipidemia       HPI:    This is a new patient to me. Hypertension  This is a chronic problem. The current episode started more than 1 year ago. The problem is unchanged. The problem is controlled. Risk factors for coronary artery disease include post-menopausal state. Past treatments include ACE inhibitors. The current treatment provides significant improvement. There are no compliance problems. Hypertensive end-organ damage includes kidney disease. Hyperlipidemia  This is a chronic problem. The current episode started more than 1 year ago. The problem is controlled. Recent lipid tests were reviewed and are normal. She has no history of diabetes. Pertinent negatives include no myalgias. Current antihyperlipidemic treatment includes statins. The current treatment provides significant improvement of lipids. There are no compliance problems. Risk factors for coronary artery disease include post-menopausal and hypertension. Fatigue  This is a new problem. The current episode started more than 1 month ago. The problem occurs daily. The problem has been unchanged. Associated symptoms include fatigue. Pertinent negatives include no coughing or myalgias. Nothing aggravates the symptoms. Treatments tried: Zoloft. The treatment provided no relief. ROS:    Review of Systems   Constitutional: Positive for fatigue. Respiratory: Negative for cough. Musculoskeletal: Negative for myalgias. /70   Pulse 67   Wt 112 lb (50.8 kg)   SpO2 97%   BMI 21.16 kg/m²     Physical Exam:    Physical Exam  Constitutional:       General: She is not in acute distress. Appearance: Normal appearance. She is normal weight.  She is not ill-appearing or toxic-appearing. HENT:      Head: Normocephalic. Neurological:      Mental Status: She is alert. Psychiatric:         Mood and Affect: Mood normal.         Behavior: Behavior normal.         Thought Content: Thought content normal.         Cognition and Memory: Memory is impaired. Current Outpatient Medications   Medication Sig Dispense Refill    fluticasone (FLONASE) 50 MCG/ACT nasal spray 1 spray by Each Nostril route daily 16 g 5    lisinopril (PRINIVIL;ZESTRIL) 20 MG tablet Take 1 tablet by mouth once daily 90 tablet 1    tiZANidine (ZANAFLEX) 2 MG tablet Take 1 tablet by mouth every 8 hours as needed (muscle spasm) 30 tablet 0    sertraline (ZOLOFT) 50 MG tablet Take 1 tablet by mouth daily 90 tablet 1    Handicap Placard MISC by Does not apply route 04/07/22 1 each 0    hydrOXYzine (ATARAX) 25 MG tablet TAKE 1 TABLET BY MOUTH ONCE DAILY AS NEEDED FOR ANXIETY 30 tablet 0    atorvastatin (LIPITOR) 10 MG tablet Take 1 tablet by mouth once daily 90 tablet 0    diclofenac sodium 1 % GEL Apply 4 g topically 4 times daily 2 Tube 5    Cholecalciferol (VITAMIN D) 2000 units CAPS capsule Take by mouth      Calcium Carbonate-Vit D-Min (CALCIUM 1200 PO) Take by mouth      aspirin 81 MG tablet Take 81 mg by mouth daily. No current facility-administered medications for this visit. Assessment:    1. Essential hypertension    2. Mixed hyperlipidemia    3. Other fatigue    4. Stage 3a chronic kidney disease ([facility]    5. Normocytic anemia    6. Memory loss    7. Non-seasonal allergic rhinitis, unspecified trigger    8. Vitamin D deficiency        Plan:    1. Essential hypertension  Stable. Continue current medications. 2. Mixed hyperlipidemia  Stable. Continue current medications. - CBC with Auto Differential; Future  - Comprehensive Metabolic Panel; Future  - Lipid Panel; Future  - Vitamin B12 & Folate; Future  - TSH with Reflex; Future    3.  Other fatigue  Likely multifactorial. Consider taking the Lipitor at night and every other night. Zoloft did not help with her fatigue. Discussed getting assessed for dementia which she likely has. Hopefully those medicines can increase her energy. 4. Stage 3a chronic kidney disease ([address]  Discussed avoiding salt and increasing water intake and avoiding NSAIDs. 5. Normocytic anemia  Possible culprit. It is stable fortunately. - Ferritin  - Iron and TIBC    6. Memory loss  She likely has dementia. - Sunita Fofana MD, Neurology, [facility]    7. Non-seasonal allergic rhinitis, unspecified trigger  - fluticasone (FLONASE) 50 MCG/ACT nasal spray; 1 spray by Each Nostril route daily  Dispense: 16 g; Refill: 5    8. Vitamin D deficiency  - Vitamin D 25 Hydroxy; Future      Return in about 3 months (around 9/17/2022) for Hypertension, Hyperlipidemia, Mood disorder.

## 2022-06-20 RX ORDER — ATORVASTATIN CALCIUM 10 MG/1
TABLET, FILM COATED ORAL
Qty: 90 TABLET | Refills: 2 | Status: SHIPPED | OUTPATIENT
Start: 2022-06-20

## 2022-06-20 NOTE — TELEPHONE ENCOUNTER
Refill Request     CONFIRM preferrred pharmacy with the patient. If Mail Order Rx - Pend for 90 day refill. Last Seen: Last Seen Department: 6/17/2022  Last Seen by PCP: 4/28/2022    Last Written: 01/31/2022 90 Tablet 0 Refills      Next Appointment:   No future appointments.     Requested Prescriptions     Pending Prescriptions Disp Refills    atorvastatin (LIPITOR) 10 MG tablet [Pharmacy Med Name: Atorvastatin Calcium 10 MG Oral Tablet] 90 tablet 0     Sig: Take 1 tablet by mouth once daily

## 2022-07-31 ENCOUNTER — APPOINTMENT (OUTPATIENT)
Dept: CT IMAGING | Age: 87
End: 2022-07-31
Payer: MEDICARE

## 2022-07-31 ENCOUNTER — HOSPITAL ENCOUNTER (EMERGENCY)
Age: 87
Discharge: HOME | End: 2022-07-31
Attending: EMERGENCY MEDICINE
Payer: MEDICARE

## 2022-07-31 ENCOUNTER — APPOINTMENT (OUTPATIENT)
Dept: GENERAL RADIOLOGY | Age: 87
End: 2022-07-31
Payer: MEDICARE

## 2022-07-31 VITALS
OXYGEN SATURATION: 100 % | RESPIRATION RATE: 18 BRPM | BODY MASS INDEX: 21.34 KG/M2 | SYSTOLIC BLOOD PRESSURE: 141 MMHG | HEART RATE: 66 BPM | WEIGHT: 113 LBS | DIASTOLIC BLOOD PRESSURE: 70 MMHG | HEIGHT: 61 IN | TEMPERATURE: 98.4 F

## 2022-07-31 DIAGNOSIS — Z86.59 CONFUSION, HX OF, WITHOUT NEURO FINDINGS: Primary | ICD-10-CM

## 2022-07-31 LAB
A/G RATIO: 1.8 (ref 1.1–2.2)
ALBUMIN SERPL-MCNC: 4.7 G/DL (ref 3.4–5)
ALP BLD-CCNC: 41 U/L (ref 40–129)
ALT SERPL-CCNC: 19 U/L (ref 10–40)
ANION GAP SERPL CALCULATED.3IONS-SCNC: 12 MMOL/L (ref 3–16)
AST SERPL-CCNC: 29 U/L (ref 15–37)
BASOPHILS ABSOLUTE: 0 K/UL (ref 0–0.2)
BASOPHILS RELATIVE PERCENT: 0.6 %
BILIRUB SERPL-MCNC: <0.2 MG/DL (ref 0–1)
BILIRUBIN URINE: NEGATIVE
BLOOD, URINE: ABNORMAL
BUN BLDV-MCNC: 26 MG/DL (ref 7–20)
CALCIUM SERPL-MCNC: 10.2 MG/DL (ref 8.3–10.6)
CHLORIDE BLD-SCNC: 100 MMOL/L (ref 99–110)
CLARITY: CLEAR
CO2: 24 MMOL/L (ref 21–32)
COLOR: YELLOW
CREAT SERPL-MCNC: 1.2 MG/DL (ref 0.6–1.2)
EOSINOPHILS ABSOLUTE: 0.1 K/UL (ref 0–0.6)
EOSINOPHILS RELATIVE PERCENT: 1 %
EPITHELIAL CELLS, UA: NORMAL /HPF (ref 0–5)
GFR AFRICAN AMERICAN: 52
GFR NON-AFRICAN AMERICAN: 43
GLUCOSE BLD-MCNC: 99 MG/DL (ref 70–99)
GLUCOSE URINE: NEGATIVE MG/DL
HCT VFR BLD CALC: 28 % (ref 36–48)
HEMOGLOBIN: 9.5 G/DL (ref 12–16)
KETONES, URINE: NEGATIVE MG/DL
LACTIC ACID, SEPSIS: 0.7 MMOL/L (ref 0.4–1.9)
LEUKOCYTE ESTERASE, URINE: NEGATIVE
LYMPHOCYTES ABSOLUTE: 1.4 K/UL (ref 1–5.1)
LYMPHOCYTES RELATIVE PERCENT: 24.1 %
MCH RBC QN AUTO: 31.4 PG (ref 26–34)
MCHC RBC AUTO-ENTMCNC: 33.8 G/DL (ref 31–36)
MCV RBC AUTO: 92.9 FL (ref 80–100)
MICROSCOPIC EXAMINATION: YES
MONOCYTES ABSOLUTE: 0.5 K/UL (ref 0–1.3)
MONOCYTES RELATIVE PERCENT: 8.2 %
NEUTROPHILS ABSOLUTE: 3.8 K/UL (ref 1.7–7.7)
NEUTROPHILS RELATIVE PERCENT: 66.1 %
NITRITE, URINE: NEGATIVE
PDW BLD-RTO: 15 % (ref 12.4–15.4)
PH UA: 5.5 (ref 5–8)
PLATELET # BLD: 160 K/UL (ref 135–450)
PMV BLD AUTO: 7.8 FL (ref 5–10.5)
POTASSIUM REFLEX MAGNESIUM: 4.7 MMOL/L (ref 3.5–5.1)
PRO-BNP: 407 PG/ML (ref 0–449)
PROTEIN UA: NEGATIVE MG/DL
RBC # BLD: 3.02 M/UL (ref 4–5.2)
RBC UA: NORMAL /HPF (ref 0–4)
SARS-COV-2, NAAT: NOT DETECTED
SODIUM BLD-SCNC: 136 MMOL/L (ref 136–145)
SPECIFIC GRAVITY UA: 1.01 (ref 1–1.03)
TOTAL PROTEIN: 7.3 G/DL (ref 6.4–8.2)
TROPONIN: <0.01 NG/ML
URINE TYPE: ABNORMAL
UROBILINOGEN, URINE: 0.2 E.U./DL
WBC # BLD: 5.8 K/UL (ref 4–11)
WBC UA: NORMAL /HPF (ref 0–5)

## 2022-07-31 PROCEDURE — 85025 COMPLETE CBC W/AUTO DIFF WBC: CPT

## 2022-07-31 PROCEDURE — 70450 CT HEAD/BRAIN W/O DYE: CPT

## 2022-07-31 PROCEDURE — 99285 EMERGENCY DEPT VISIT HI MDM: CPT

## 2022-07-31 PROCEDURE — 83605 ASSAY OF LACTIC ACID: CPT

## 2022-07-31 PROCEDURE — 87635 SARS-COV-2 COVID-19 AMP PRB: CPT

## 2022-07-31 PROCEDURE — 71046 X-RAY EXAM CHEST 2 VIEWS: CPT

## 2022-07-31 PROCEDURE — 81001 URINALYSIS AUTO W/SCOPE: CPT

## 2022-07-31 PROCEDURE — 80053 COMPREHEN METABOLIC PANEL: CPT

## 2022-07-31 PROCEDURE — 84484 ASSAY OF TROPONIN QUANT: CPT

## 2022-07-31 PROCEDURE — 83880 ASSAY OF NATRIURETIC PEPTIDE: CPT

## 2022-07-31 PROCEDURE — 36415 COLL VENOUS BLD VENIPUNCTURE: CPT

## 2022-07-31 PROCEDURE — 93005 ELECTROCARDIOGRAM TRACING: CPT | Performed by: NURSE PRACTITIONER

## 2022-07-31 ASSESSMENT — ENCOUNTER SYMPTOMS
SHORTNESS OF BREATH: 0
COLOR CHANGE: 0
ABDOMINAL PAIN: 0
SORE THROAT: 0
RHINORRHEA: 0
FACIAL SWELLING: 0

## 2022-07-31 ASSESSMENT — PAIN SCALES - GENERAL: PAINLEVEL_OUTOF10: 2

## 2022-07-31 ASSESSMENT — PAIN - FUNCTIONAL ASSESSMENT: PAIN_FUNCTIONAL_ASSESSMENT: 0-10

## 2022-07-31 NOTE — ED PROVIDER NOTES
[address] [facility]        Pt Name: Jabari Lang  MRN: [id number]  [place] 1935  Dateof evaluation: 7/31/2022  Provider: DUY Nobles - CNP  PCP: Hank Becerra DO  ED Attending: No att. providers found    CHIEF COMPLAINT       Chief Complaint   Patient presents with    Urinary Tract Infection     Pt presents to the ED from home concerned for UTI. Pt states she has had increased urinary frequency. Pt reports burning with urination. Pt denies pain in abd and blood in urine        HISTORY OF PRESENTILLNESS   (Location/Symptom, Timing/Onset, Context/Setting, Quality, Duration, Modifying Factors, Severity)  Note limiting factors. Jabari Lang is a 82 y.o. female for increased confusion. Onset was 1 week. Context includes patient was brought in by her daughter for concerns for increased confusion. Daughter was requesting a urinalysis to ensure she does not have a UTI. Daughter reports that she has had increased confusion at home. Patient reportedly lives at home with her elderly  who has dementia. Daughter reports the patient has had no complaints other than the confusion. I did talk to the patient before the daughter arrived and the patient states that she is here because the doctor wanted a test done. Patient is oriented to person place and time. She denies any complaints. She denies chest pain shortness of breath abdominal pain or  complaints. Alleviating factors include nothing. Aggravating factors include nothing. Pain is 0/10. Nothing has been used for pain today. Nursing Notes were all reviewed and agreed with or any disagreements were addressed  in the HPI. REVIEW OF SYSTEMS    (2-9 systems for level 4, 10 or more for level 5)     Review of Systems   Constitutional:  Negative for activity change, appetite change and fever. HENT:  Negative for congestion, facial swelling, rhinorrhea and sore throat. Eyes:  Negative for visual disturbance. Respiratory:  Negative for shortness of breath. Cardiovascular:  Negative for chest pain. Gastrointestinal:  Negative for abdominal pain. Genitourinary:  Negative for difficulty urinating. Musculoskeletal:  Negative for arthralgias and myalgias. Skin:  Negative for color change and rash. Neurological:  Negative for dizziness and light-headedness. Psychiatric/Behavioral:  Positive for confusion. Negative for agitation. All other systems reviewed and are negative. Positives and Pertinent negatives as per HPI. Except as noted above in the ROS, all other systems were reviewed and negative. PAST MEDICAL HISTORY     Past Medical History:   Diagnosis Date    Arthritis     Chicken pox     Chills     CKD (chronic kidney disease) stage 3, GFR 30-59 ml/min ([facility]) 1/10/2019    Current moderate episode of major depressive disorder without prior episode ([facility] [address] 4/28/2022    Fever     Hip pain     Hyperlipidemia     Hypertension     Measles     Miscarriage     Osteoarthritis     Pneumonia     Poor circulation     Ringing in ears     Sacroiliitis ([facility] [address] 4/15/2016    Sensorineural hearing loss, bilateral 10/20/2020    Swelling of both ankles     Tonsillitis          SURGICAL HISTORY       Past Surgical History:   Procedure Laterality Date    COLONOSCOPY  11/09/2016    normal/ hemmoroids    COLONOSCOPY N/A 5/3/2021    COLONOSCOPY WITH BIOPSY performed by Samanta Malin MD at [facility] (N/A )    SHOULDER SURGERY           CURRENT MEDICATIONS       Previous Medications    ASPIRIN 81 MG TABLET    Take 81 mg by mouth daily.     ATORVASTATIN (LIPITOR) 10 MG TABLET    Take 1 tablet by mouth once daily    CALCIUM CARBONATE-VIT D-MIN (CALCIUM 1200 PO)    Take by mouth    CHOLECALCIFEROL (VITAMIN D) 2000 UNITS CAPS CAPSULE    Take by mouth    DICLOFENAC SODIUM 1 % GEL    Apply 4 g topically 4 times daily FLUTICASONE (FLONASE) 50 MCG/ACT NASAL SPRAY    1 spray by Each Nostril route daily    HANDICAP PLACARD MISC    by Does not apply route 04/07/22    HYDROXYZINE (ATARAX) 25 MG TABLET    TAKE 1 TABLET BY MOUTH ONCE DAILY AS NEEDED FOR ANXIETY    LISINOPRIL (PRINIVIL;ZESTRIL) 20 MG TABLET    Take 1 tablet by mouth once daily    SERTRALINE (ZOLOFT) 50 MG TABLET    Take 1 tablet by mouth daily    TIZANIDINE (ZANAFLEX) 2 MG TABLET    Take 1 tablet by mouth every 8 hours as needed (muscle spasm)         ALLERGIES     Codeine and Toprol xl [metoprolol]    FAMILY HISTORY       Family History   Problem Relation Age of Onset    Heart Disease Mother     Stroke Mother     High Blood Pressure Mother     Arthritis Mother     High Blood Pressure Brother           SOCIAL HISTORY       Social History     Socioeconomic History    Marital status:      Spouse name: None    Number of children: None    Years of education: None    Highest education level: None   Tobacco Use    Smoking status: Never    Smokeless tobacco: Never   Substance and Sexual Activity    Alcohol use: No    Drug use: No    Sexual activity: Yes     Partners: Male     Social Determinants of Health     Financial Resource Strain: Low Risk     Difficulty of Paying Living Expenses: Not hard at all   Food Insecurity: No Food Insecurity    Worried About Running Out of Food in the Last Year: Never true    Ran Out of Food in the Last Year: Never true   Physical Activity: Unknown    Days of Exercise per Week: 0 days       SCREENINGS   NIH Stroke Scale  Interval: Baseline  Level of Consciousness (1a): Alert  LOC Questions (1b): Answers both correctly  LOC Commands (1c): Performs both tasks correctly  Best Gaze (2): Normal  Visual (3): No visual loss  Facial Palsy (4): Normal symmetrical movement  Motor Arm, Left (5a): No drift  Motor Arm, Right (5b): No drift  Motor Leg, Left (6a): No drift  Motor Leg, Right (6b):  No drift  Limb Ataxia (7): Absent  Sensory (8): Normal  Best Language (9): No aphasia  Dysarthria (10): Normal  Extinction and Inattention (11): No abnormality  Total: 0Glasgow Coma Scale  Eye Opening: Spontaneous  Best Verbal Response: Oriented  Best Motor Response: Obeys commands  Kinsman Coma Scale Score: 15        PHYSICAL EXAM  (up to 7 for level 4, 8 or more for level 5)     ED Triage Vitals [07/31/22 1444]   BP Temp Temp Source Heart Rate Resp SpO2 Height Weight   (!) 154/56 98.4 °F (36.9 °C) Oral 63 18 100 % 5' 1\" (1.549 m) 113 lb (51.3 kg)       Physical Exam  Constitutional:       Appearance: Normal appearance. She is well-developed. HENT:      Head: Normocephalic and atraumatic. Eyes:      Extraocular Movements: Extraocular movements intact. Pupils: Pupils are equal, round, and reactive to light. Cardiovascular:      Rate and Rhythm: Normal rate. Pulmonary:      Effort: Pulmonary effort is normal. No respiratory distress. Abdominal:      General: There is no distension. Palpations: Abdomen is soft. Tenderness: There is no abdominal tenderness. Musculoskeletal:         General: Normal range of motion. Cervical back: Normal range of motion. Skin:     General: Skin is warm and dry. Neurological:      Mental Status: She is alert.       Comments: Oriented to person place and time       DIAGNOSTIC RESULTS   LABS:    Labs Reviewed   URINALYSIS - Abnormal; Notable for the following components:       Result Value    Blood, Urine TRACE-INTACT (*)     All other components within normal limits   CBC WITH AUTO DIFFERENTIAL - Abnormal; Notable for the following components:    RBC 3.02 (*)     Hemoglobin 9.5 (*)     Hematocrit 28.0 (*)     All other components within normal limits   COMPREHENSIVE METABOLIC PANEL W/ REFLEX TO MG FOR LOW K - Abnormal; Notable for the following components:    BUN 26 (*)     GFR Non- 43 (*)     GFR  52 (*)     All other components within normal limits   COVID-19, RAPID MICROSCOPIC URINALYSIS   BRAIN NATRIURETIC PEPTIDE   TROPONIN   LACTATE, SEPSIS   LACTATE, SEPSIS   LACTATE, SEPSIS       All other labs werewithin normal range or not returned as of this dictation. EKG: All EKG's are interpreted by the Emergency Department Physician who either signs or Co-signs this chart in the absence of a cardiologist.  Please see their note for interpretation of EKG. RADIOLOGY:   Chest x-ray interpreted by radiologist for  Impression:    No radiographic evidence of acute pulmonary disease. Head CT interpreted by radiologist for    Impression:    No acute intracranial abnormality. Interpretation per the Radiologist below, if available at the time of this note:    CT HEAD WO CONTRAST   Final Result   No acute intracranial abnormality. XR CHEST (2 VW)   Final Result   No radiographic evidence of acute pulmonary disease. No results found. PROCEDURES   Unless otherwise noted below, none     Procedures     CRITICAL CARE TIME   N/A    CONSULTS:  None      EMERGENCYDEPARTMENT COURSE and DIFFERENTIAL DIAGNOSIS/MDM:   Vitals:    Vitals:    07/31/22 1444 07/31/22 1550 07/31/22 1648   BP: (!) 154/56 (!) 148/71 (!) 141/70   Pulse: 63 60 66   Resp: 18 18 18   Temp: 98.4 °F (36.9 °C)     TempSrc: Oral     SpO2: 100% 100% 100%   Weight: 113 lb (51.3 kg)     Height: 5' 1\" (1.549 m)         Patient was given the following medications:  Medications - No data to display    Patient was seen and evaluated by myself and Dr. Hamlet Boudreaux. Patient here for concerns for confusion. Chief complaint initially states that the patient is here for urinary tract infection however the daughter reports the patient has had increased confusion for the last week. She is requesting a urinalysis to ensure she does not have UTI. Daughter reports that she has not had an official diagnosis of dementia but she is concerned something is going on.   Patient does live at home with her DISPOSITION/PLAN   DISPOSITION Decision To Discharge 07/31/2022 04:52:51 PM      PATIENT REFERRED TO:  DO Aron Rodriges  [phone]    Schedule an appointment as soon as possible for a visit in 2 days  for re-evaluation    [facility]  [address] [place] [address]  [phone]    If symptoms worsen    DISCHARGE MEDICATIONS:  New Prescriptions    No medications on file       DISCONTINUED MEDICATIONS:  Discontinued Medications    No medications on file              (Please note that portions of this note were completed with a voice recognition program.  Efforts were made to edit the dictations but occasionally words are mis-transcribed.)    DUY Caldwell CNP (electronically signed)          DUY Caldwell CNP  07/31/22 1495

## 2022-07-31 NOTE — ED PROVIDER NOTES
normal  No significant change from prior EKG dated - no old EKG  No STEMI, some baseline artifact is noted limiting interpretation but overall EKG does not show any acute ST changes    MDM: Patient was evaluated due to concern for possible increasing confusion over the last week. Vitals were reassuring during initial evaluation and she denied any pain or concerns when I was in the room. Urinalysis was negative for infection. CT was negative for intracranial hemorrhage. X-ray of the chest did not show any concern for pneumonia. Lab work was otherwise reassuring. At this time, family does feel comfortable with her going back home and we will plan to follow-up with her frequently over the next couple of days to be safe. The patient and family are aware if she develops any worsening confusion with fever, headache, chest pain, then return to the emergency department immediately for further assessment, but otherwise follow-up with primary doctor over the next 2 to 3 days for repeat assessment. The patient was well-appearing and in no acute distress when I saw her and she along with her family felt comfortable this plan.      Ivon Ba MD  07/31/22 1177

## 2022-07-31 NOTE — ED NOTES
Pt AVS and follow up reviewed. Pt expressed understanding and dc at this time.       Yvette Galvan RN  07/31/22 5755

## 2022-08-01 LAB
EKG ATRIAL RATE: 62 BPM
EKG DIAGNOSIS: NORMAL
EKG P AXIS: 72 DEGREES
EKG P-R INTERVAL: 174 MS
EKG Q-T INTERVAL: 396 MS
EKG QRS DURATION: 82 MS
EKG QTC CALCULATION (BAZETT): 401 MS
EKG R AXIS: 43 DEGREES
EKG T AXIS: 54 DEGREES
EKG VENTRICULAR RATE: 62 BPM

## 2022-10-16 DIAGNOSIS — F32.1 CURRENT MODERATE EPISODE OF MAJOR DEPRESSIVE DISORDER WITHOUT PRIOR EPISODE (HCC): ICD-10-CM

## 2022-10-16 NOTE — TELEPHONE ENCOUNTER
Refill Request     CONFIRM preferrred pharmacy with the patient. If Mail Order Rx - Pend for 90 day refill. Last Seen: Last Seen Department: 6/17/2022  Last Seen by PCP: 4/28/2022    Last Written: 4/14/2022 Sertraline  5/5/2022 Lisinopril     If no future appointment scheduled, route STAFF MESSAGE with patient name to the [facility] for scheduling. Next Appointment:   Future Appointments   Date Time Provider Alejandra Castillo   10/18/2022  2:00 PM DO SIRIA Valles [place] - DYD       Message sent to [address] [place] [address] to schedule appt with patient?   NO      Requested Prescriptions     Pending Prescriptions Disp Refills    sertraline (ZOLOFT) 50 MG tablet [Pharmacy Med Name: Sertraline HCl 50 MG Oral Tablet] 90 tablet 1     Sig: Take 1 tablet by mouth once daily    lisinopril (PRINIVIL;ZESTRIL) 20 MG tablet [Pharmacy Med Name: Lisinopril 20 MG Oral Tablet] 90 tablet 1     Sig: Take 1 tablet by mouth once daily

## 2022-10-17 RX ORDER — LISINOPRIL 20 MG/1
TABLET ORAL
Qty: 90 TABLET | Refills: 1 | Status: SHIPPED | OUTPATIENT
Start: 2022-10-17

## 2022-10-18 ENCOUNTER — OFFICE VISIT (OUTPATIENT)
Dept: FAMILY MEDICINE CLINIC | Age: 87
End: 2022-10-18
Payer: MEDICARE

## 2022-10-18 VITALS
BODY MASS INDEX: 21.84 KG/M2 | WEIGHT: 115.6 LBS | DIASTOLIC BLOOD PRESSURE: 60 MMHG | HEART RATE: 91 BPM | OXYGEN SATURATION: 97 % | SYSTOLIC BLOOD PRESSURE: 100 MMHG

## 2022-10-18 DIAGNOSIS — E78.5 HYPERLIPIDEMIA, UNSPECIFIED HYPERLIPIDEMIA TYPE: ICD-10-CM

## 2022-10-18 DIAGNOSIS — N18.32 STAGE 3B CHRONIC KIDNEY DISEASE (HCC): Primary | ICD-10-CM

## 2022-10-18 DIAGNOSIS — F32.1 CURRENT MODERATE EPISODE OF MAJOR DEPRESSIVE DISORDER WITHOUT PRIOR EPISODE (HCC): ICD-10-CM

## 2022-10-18 DIAGNOSIS — J30.89 NON-SEASONAL ALLERGIC RHINITIS, UNSPECIFIED TRIGGER: ICD-10-CM

## 2022-10-18 DIAGNOSIS — R73.03 PRE-DIABETES: ICD-10-CM

## 2022-10-18 PROCEDURE — 1123F ACP DISCUSS/DSCN MKR DOCD: CPT | Performed by: STUDENT IN AN ORGANIZED HEALTH CARE EDUCATION/TRAINING PROGRAM

## 2022-10-18 PROCEDURE — 83036 HEMOGLOBIN GLYCOSYLATED A1C: CPT | Performed by: STUDENT IN AN ORGANIZED HEALTH CARE EDUCATION/TRAINING PROGRAM

## 2022-10-18 PROCEDURE — 99214 OFFICE O/P EST MOD 30 MIN: CPT | Performed by: STUDENT IN AN ORGANIZED HEALTH CARE EDUCATION/TRAINING PROGRAM

## 2022-10-18 RX ORDER — FLUTICASONE PROPIONATE 50 MCG
1 SPRAY, SUSPENSION (ML) NASAL DAILY
Qty: 16 G | Refills: 5 | Status: SHIPPED | OUTPATIENT
Start: 2022-10-18

## 2022-10-18 RX ORDER — DONEPEZIL HYDROCHLORIDE 10 MG/1
TABLET, FILM COATED ORAL
COMMUNITY
Start: 2022-09-28

## 2022-10-18 NOTE — PROGRESS NOTES
Patient: Jodi Miller is a 82 y.o. female who presents today with the following Chief Complaint(s):  Chief Complaint   Patient presents with    Follow-up    Fatigue         HPI    Depression  Has been on Zoloft 50 mg for the last 7 months    Hyperlipidemia  Well-controlled on Lipitor 10. Most recent cholesterol 4 months ago. Hypertension  Controlled on lisinopril 20    Lumbar facet joint syndrome and degenerative disc disease  Following with physical medicine and rehabilitation. Dr. Keyshawn Oliver. Previous radiofrequency ablation. Able to repeat radiofrequency ablation next month if needed. Using Tylenol and Lidoderm patches as needed    Current Outpatient Medications   Medication Sig Dispense Refill    donepezil (ARICEPT) 10 MG tablet TAKE 1/2 (ONE-HALF) TABLET BY MOUTH ONCE DAILY FOR 1 MONTH, THEN 1 TABLET ONCE DAILY      fluticasone (FLONASE) 50 MCG/ACT nasal spray 1 spray by Each Nostril route daily 16 g 5    sertraline (ZOLOFT) 50 MG tablet Take 1.5 tablets by mouth daily 135 tablet 1    lisinopril (PRINIVIL;ZESTRIL) 20 MG tablet Take 1 tablet by mouth once daily 90 tablet 1    atorvastatin (LIPITOR) 10 MG tablet Take 1 tablet by mouth once daily 90 tablet 2    tiZANidine (ZANAFLEX) 2 MG tablet Take 1 tablet by mouth every 8 hours as needed (muscle spasm) 30 tablet 0    Handicap Placard MISC by Does not apply route 04/07/22 1 each 0    hydrOXYzine (ATARAX) 25 MG tablet TAKE 1 TABLET BY MOUTH ONCE DAILY AS NEEDED FOR ANXIETY 30 tablet 0    diclofenac sodium 1 % GEL Apply 4 g topically 4 times daily 2 Tube 5    Cholecalciferol (VITAMIN D) 2000 units CAPS capsule Take by mouth      Calcium Carbonate-Vit D-Min (CALCIUM 1200 PO) Take by mouth      aspirin 81 MG tablet Take 81 mg by mouth daily. No current facility-administered medications for this visit.        Patient's past medical history, surgical history, family history, medications,  andallergies  were all reviewed and updated as appropriate today.      Review of Systems  All other systems reviewed and negative    Physical Exam  Vitals reviewed. Constitutional:       Appearance: Normal appearance. HENT:      Head: Normocephalic and atraumatic. Cardiovascular:      Rate and Rhythm: Normal rate and regular rhythm. Pulmonary:      Effort: Pulmonary effort is normal.      Breath sounds: Normal breath sounds. Neurological:      General: No focal deficit present. Mental Status: She is alert and oriented to person, place, and time. Psychiatric:         Behavior: Behavior normal.         Thought Content: Thought content normal.     Vitals:    10/18/22 1401   BP: 100/60   Pulse: 91   SpO2: 97%       Assessment:  Encounter Diagnoses   Name Primary? Stage 3b chronic kidney disease ([address] Yes    Current moderate episode of major depressive disorder without prior episode ([address]     Hyperlipidemia, unspecified hyperlipidemia type     Pre-diabetes     Non-seasonal allergic rhinitis, unspecified trigger        Plan:  1. Stage 3b chronic kidney disease ([address]  Stable on testing    2. Current moderate episode of major depressive disorder without prior episode ([address]  Continued fatigue. Work up has been reassuring for alternative cause. No shortness of breath, decreased exercise tolerance, LE swelling or other signs of cardiac cause. Will increase zoloft to see if this improves  - sertraline (ZOLOFT) 50 MG tablet; Take 1.5 tablets by mouth daily  Dispense: 135 tablet; Refill: 1    3. Hyperlipidemia, unspecified hyperlipidemia type  At goal    4. Pre-diabetes  - POCT glycosylated hemoglobin (Hb A1C)    5. Non-seasonal allergic rhinitis, unspecified trigger  - fluticasone (FLONASE) 50 MCG/ACT nasal spray; 1 spray by Each Nostril route daily  Dispense: 16 g; Refill: 5      No follow-ups on file.

## 2022-11-08 ENCOUNTER — TELEPHONE (OUTPATIENT)
Dept: FAMILY MEDICINE CLINIC | Age: 87
End: 2022-11-08

## 2022-11-08 ENCOUNTER — PATIENT MESSAGE (OUTPATIENT)
Dept: FAMILY MEDICINE CLINIC | Age: 87
End: 2022-11-08

## 2022-11-08 DIAGNOSIS — Z63.6 CAREGIVER STRESS: ICD-10-CM

## 2022-11-08 DIAGNOSIS — G47.00 INSOMNIA, UNSPECIFIED TYPE: Primary | ICD-10-CM

## 2022-11-08 RX ORDER — TRAZODONE HYDROCHLORIDE 50 MG/1
50 TABLET ORAL NIGHTLY
Qty: 30 TABLET | Refills: 0 | Status: SHIPPED | OUTPATIENT
Start: 2022-11-08

## 2022-11-08 RX ORDER — HYDROXYZINE HYDROCHLORIDE 25 MG/1
TABLET, FILM COATED ORAL
Qty: 30 TABLET | Refills: 0 | Status: SHIPPED | OUTPATIENT
Start: 2022-11-08

## 2022-11-08 SDOH — SOCIAL STABILITY - SOCIAL INSECURITY: DEPENDENT RELATIVE NEEDING CARE AT HOME: Z63.6

## 2022-11-08 NOTE — TELEPHONE ENCOUNTER
Refill Request     CONFIRM preferrred pharmacy with the patient. If Mail Order Rx - Pend for 90 day refill. Last Seen: Last Seen Department: 10/18/2022  Last Seen by PCP: 10/18/2022    Last Written: 4/12/19 2 tubes 5 refills    If no future appointment scheduled, route STAFF MESSAGE with patient name to the [facility] for scheduling. Next Appointment:   No future appointments. Message sent to [address] [place] [address] to schedule appt with patient?   NO      Requested Prescriptions     Pending Prescriptions Disp Refills    diclofenac sodium (VOLTAREN) 1 % GEL 4 g 0     Sig: Apply 4 g topically 4 times daily

## 2022-11-08 NOTE — TELEPHONE ENCOUNTER
Patients daughter called in and said patient lost her spouse last week and has not been able to sleep and the patients daughter is asking for something to hep the patient sleep and with the patients anxiety.  Please advise

## 2022-11-08 NOTE — TELEPHONE ENCOUNTER
Patient's daughter notified of Dr. Linda Cagle message and will  the Trazodone tonight. She states that counseling would not be an option as her mother's \"mind is gone\". Will schedule follow up if needed.

## 2022-11-08 NOTE — TELEPHONE ENCOUNTER
From: Danielle Palacios  To: Dr. Janki Brandon  Sent: 11/8/2022 11:48 AM EST  Subject: diclofenac sodium 1 % Gel    requesting refill to help with inflammation.   Thank you

## 2022-11-08 NOTE — TELEPHONE ENCOUNTER
Refill Request     CONFIRM preferrred pharmacy with the patient. If Mail Order Rx - Pend for 90 day refill. Last Seen: Last Seen Department: 10/18/2022  Last Seen by PCP: 10/18/2022    Last Written: 03/10/2022 30 tablet 0 refills     If no future appointment scheduled, route STAFF MESSAGE with patient name to the [facility] for scheduling. Next Appointment:   No future appointments. Message sent to [address] [place] [address] to schedule appt with patient?   N/A      Requested Prescriptions     Pending Prescriptions Disp Refills    hydrOXYzine HCl (ATARAX) 25 MG tablet 30 tablet 0     Sig: TAKE 1 TABLET BY MOUTH ONCE DAILY AS NEEDED FOR ANXIETY

## 2022-11-09 ENCOUNTER — TELEPHONE (OUTPATIENT)
Dept: FAMILY MEDICINE CLINIC | Age: 87
End: 2022-11-09

## 2022-11-09 DIAGNOSIS — M19.90 ARTHRITIS: Primary | ICD-10-CM

## 2022-11-09 NOTE — TELEPHONE ENCOUNTER
[address] David Huggins faxed over a paper needing clarification on diclofenac 1% Gel medication was written for grams, should it be 400 grams?  (4 tubes) please advise

## 2022-11-09 NOTE — TELEPHONE ENCOUNTER
Received phone call from Freddy Fournier with [employer] regarding clarification for diclofenac Gel. She is asking if this was meant to say 4g or was it meant to be 4 tubes? Please advise.     Good call back phone #:[phone]

## 2022-11-10 ENCOUNTER — TELEPHONE (OUTPATIENT)
Dept: ADMINISTRATIVE | Age: 87
End: 2022-11-10

## 2022-11-10 NOTE — TELEPHONE ENCOUNTER
Submitted PA for hydrOXYzine HCl 25MG tablets, Key: [id number]. Status:Approved; Review Type:Prior Auth; Coverage Start Date:10/11/2022; Coverage End Date:11/10/2023. Please notify patient. Thank you.

## 2022-12-12 ENCOUNTER — TELEPHONE (OUTPATIENT)
Dept: ENT CLINIC | Age: 87
End: 2022-12-12

## 2022-12-12 NOTE — TELEPHONE ENCOUNTER
Patient daughter in town and found patients hearing aids. She thinks she needs new batteries. She wanted to know if she needed an appt to change the batteries out? The batteries are over 3years old. She is trying to get them to work without bringing her in. Her regular care takers are sick.    Marry [phone]

## 2022-12-16 NOTE — TELEPHONE ENCOUNTER
New batteries received. Called patient's daughter. She decided to drop off devices. Reminded of $50.00 drop off fee-reported understanding. Will call patient's daughter, Christian Garnica when devices are ready.

## 2022-12-17 DIAGNOSIS — Z63.6 CAREGIVER STRESS: ICD-10-CM

## 2022-12-17 SDOH — SOCIAL STABILITY - SOCIAL INSECURITY: DEPENDENT RELATIVE NEEDING CARE AT HOME: Z63.6

## 2022-12-17 NOTE — TELEPHONE ENCOUNTER
Refill Request     CONFIRM preferrred pharmacy with the patient. If Mail Order Rx - Pend for 90 day refill. Last Seen: Last Seen Department: 10/18/2022  Last Seen by PCP: 10/18/2022    Last Written: 11/8/2022    If no future appointment scheduled, route STAFF MESSAGE with patient name to the [facility] for scheduling. Next Appointment:   No future appointments. Message sent to Panl to schedule appt with patient?   NO      Requested Prescriptions     Pending Prescriptions Disp Refills    hydrOXYzine HCl (ATARAX) 25 MG tablet [Pharmacy Med Name: hydrOXYzine HCl 25 MG Oral Tablet] 30 tablet 0     Sig: TAKE 1 TABLET BY MOUTH ONCE DAILY AS NEEDED FOR ANXIETY

## 2022-12-19 RX ORDER — HYDROXYZINE HYDROCHLORIDE 25 MG/1
TABLET, FILM COATED ORAL
Qty: 30 TABLET | Refills: 0 | Status: SHIPPED | OUTPATIENT
Start: 2022-12-19

## 2022-12-21 ENCOUNTER — PROCEDURE VISIT (OUTPATIENT)
Dept: AUDIOLOGY | Age: 87
End: 2022-12-21

## 2022-12-21 DIAGNOSIS — H90.3 SENSORINEURAL HEARING LOSS, BILATERAL: Primary | ICD-10-CM

## 2022-12-21 NOTE — PROGRESS NOTES
Telma Paniagua  1935.87 y.o. female   [id number]    HEARING [address] [facility]    Telma Paniagua was seen today, 12/21/2022, for a hearing aid  repair    PROCEDURES:     Patient came in today for hearing aid  repair. New rechargeable batteries inserted to new devices and re-programmed to user settings. Listening check revealed good function of devices. Devices fully charged in office. PATIENT EDUCATION:     - Verbally and visually reviewed importance of consistent use and care and maintenance of devices. Information was verbally shared with patient during appointment. RECOMMENDATIONS:     Continue consistent hearing aid use  Return for hearing aid checks as needed  Retest hearing as medically indicated and/or sooner if a change in hearing is noted. Contact audiologist with questions/concerns as needed      **Patient paid out of pocket $50.00 for today's hearing aid  appointment.     Joao Rivera  Audiologist

## 2023-01-04 ENCOUNTER — TELEPHONE (OUTPATIENT)
Dept: AUDIOLOGY | Age: 88
End: 2023-01-04

## 2023-01-04 NOTE — TELEPHONE ENCOUNTER
Patients daughter called stating that the patient has lost one of her hearing aides. She did not recall which side. Patients daughter would like to know how much it will be to replace one  hearing aide.      Please advise:   Lolis Almeida [phone]  Franklin Garcia [phone]

## 2023-01-05 NOTE — TELEPHONE ENCOUNTER
Returned patient's call. Left VM for Arely. Patient previously replaced left device and no longer has loss & damage option for left. If she lost the right there is a $350.00 deductible since L&D has not been used. Would be $650.00 if needing to get new left device. Please let me know if any additional questions.

## 2023-01-12 DIAGNOSIS — M47.816 SPONDYLOSIS OF LUMBAR REGION WITHOUT MYELOPATHY OR RADICULOPATHY: ICD-10-CM

## 2023-01-12 NOTE — TELEPHONE ENCOUNTER
.Refill Request     CONFIRM preferrred pharmacy with the patient. If Mail Order Rx - Pend for 90 day refill. Last Seen: Last Seen Department: 10/18/2022  Last Seen by PCP: 10/18/2022    Last Written: 4-28-22 30 with 0     If no future appointment scheduled, route STAFF MESSAGE with patient name to the [facility] for scheduling. Next Appointment:   Future Appointments   Date Time Provider Alejandra Castillo   1/23/2023  8:40 AM  [facility] AND [facility]       Message sent to  to schedule appt with patient?   YES      Requested Prescriptions     Pending Prescriptions Disp Refills    tiZANidine (ZANAFLEX) 2 MG tablet 30 tablet 0     Sig: Take 1 tablet by mouth every 8 hours as needed (muscle spasm)

## 2023-01-13 RX ORDER — TIZANIDINE 2 MG/1
2 TABLET ORAL EVERY 8 HOURS PRN
Qty: 30 TABLET | Refills: 0 | Status: SHIPPED | OUTPATIENT
Start: 2023-01-13

## 2023-01-24 ENCOUNTER — APPOINTMENT (OUTPATIENT)
Dept: GENERAL RADIOLOGY | Age: 88
End: 2023-01-24
Payer: MEDICARE

## 2023-01-24 ENCOUNTER — HOSPITAL ENCOUNTER (INPATIENT)
Age: 88
LOS: 1 days | Discharge: HOME | DRG: 563 | End: 2023-01-25
Attending: INTERNAL MEDICINE | Admitting: INTERNAL MEDICINE
Payer: MEDICARE

## 2023-01-24 ENCOUNTER — HOSPITAL ENCOUNTER (EMERGENCY)
Age: 88
Discharge: ANOTHER HOSPITAL | End: 2023-01-24
Attending: STUDENT IN AN ORGANIZED HEALTH CARE EDUCATION/TRAINING PROGRAM
Payer: MEDICARE

## 2023-01-24 ENCOUNTER — APPOINTMENT (OUTPATIENT)
Dept: CT IMAGING | Age: 88
End: 2023-01-24
Payer: MEDICARE

## 2023-01-24 VITALS
SYSTOLIC BLOOD PRESSURE: 102 MMHG | BODY MASS INDEX: 20.02 KG/M2 | RESPIRATION RATE: 15 BRPM | DIASTOLIC BLOOD PRESSURE: 53 MMHG | HEIGHT: 63 IN | WEIGHT: 113 LBS | OXYGEN SATURATION: 95 % | TEMPERATURE: 98 F | HEART RATE: 62 BPM

## 2023-01-24 DIAGNOSIS — S61.209A OPEN DISLOCATION OF INTERPHALANGEAL JOINT OF FINGER: Primary | ICD-10-CM

## 2023-01-24 DIAGNOSIS — S63.279A OPEN DISLOCATION OF INTERPHALANGEAL JOINT OF FINGER: Primary | ICD-10-CM

## 2023-01-24 DIAGNOSIS — N17.9 AKI (ACUTE KIDNEY INJURY) (HCC): ICD-10-CM
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
## 2023-01-24 DIAGNOSIS — S63.125A OPEN DISLOCATION OF INTERPHALANGEAL JOINT OF LEFT THUMB, INITIAL ENCOUNTER: Primary | ICD-10-CM

## 2023-01-24 DIAGNOSIS — S61.002A OPEN DISLOCATION OF INTERPHALANGEAL JOINT OF LEFT THUMB, INITIAL ENCOUNTER: Primary | ICD-10-CM

## 2023-01-24 DIAGNOSIS — R77.8 ELEVATED TROPONIN: ICD-10-CM

## 2023-01-24 PROBLEM — S63.106A: Status: ACTIVE | Noted: 2023-01-24

## 2023-01-24 LAB
A/G RATIO: 1.5 (ref 1.1–2.2)
ALBUMIN SERPL-MCNC: 4.4 G/DL (ref 3.4–5)
ALP BLD-CCNC: 62 U/L (ref 40–129)
ALT SERPL-CCNC: 17 U/L (ref 10–40)
ANION GAP SERPL CALCULATED.3IONS-SCNC: 14 MMOL/L (ref 3–16)
AST SERPL-CCNC: 26 U/L (ref 15–37)
BASOPHILS ABSOLUTE: 0 K/UL (ref 0–0.2)
BASOPHILS RELATIVE PERCENT: 0.6 %
BILIRUB SERPL-MCNC: 0.3 MG/DL (ref 0–1)
BILIRUBIN URINE: NEGATIVE
BLOOD, URINE: NEGATIVE
BUN BLDV-MCNC: 45 MG/DL (ref 7–20)
CALCIUM SERPL-MCNC: 10.2 MG/DL (ref 8.3–10.6)
CHLORIDE BLD-SCNC: 103 MMOL/L (ref 99–110)
CLARITY: CLEAR
CO2: 23 MMOL/L (ref 21–32)
COLOR: YELLOW
CREAT SERPL-MCNC: 1.8 MG/DL (ref 0.6–1.2)
EKG ATRIAL RATE: 56 BPM
EKG DIAGNOSIS: NORMAL
EKG P AXIS: 80 DEGREES
EKG P-R INTERVAL: 186 MS
EKG Q-T INTERVAL: 430 MS
EKG QRS DURATION: 88 MS
EKG QTC CALCULATION (BAZETT): 414 MS
EKG R AXIS: 78 DEGREES
EKG T AXIS: 92 DEGREES
EKG VENTRICULAR RATE: 56 BPM
EOSINOPHILS ABSOLUTE: 0.1 K/UL (ref 0–0.6)
EOSINOPHILS RELATIVE PERCENT: 1.7 %
GFR SERPL CREATININE-BSD FRML MDRD: 27 ML/MIN/{1.73_M2}
GLUCOSE BLD-MCNC: 125 MG/DL (ref 70–99)
GLUCOSE URINE: NEGATIVE MG/DL
HCT VFR BLD CALC: 31.2 % (ref 36–48)
HEMOGLOBIN: 10.3 G/DL (ref 12–16)
KETONES, URINE: ABNORMAL MG/DL
LEUKOCYTE ESTERASE, URINE: NEGATIVE
LYMPHOCYTES ABSOLUTE: 1.3 K/UL (ref 1–5.1)
LYMPHOCYTES RELATIVE PERCENT: 21.9 %
MCH RBC QN AUTO: 29.3 PG (ref 26–34)
MCHC RBC AUTO-ENTMCNC: 33 G/DL (ref 31–36)
MCV RBC AUTO: 88.8 FL (ref 80–100)
MICROSCOPIC EXAMINATION: ABNORMAL
MONOCYTES ABSOLUTE: 0.3 K/UL (ref 0–1.3)
MONOCYTES RELATIVE PERCENT: 5.9 %
NEUTROPHILS ABSOLUTE: 4.1 K/UL (ref 1.7–7.7)
NEUTROPHILS RELATIVE PERCENT: 69.9 %
NITRITE, URINE: NEGATIVE
PDW BLD-RTO: 15 % (ref 12.4–15.4)
PH UA: 6 (ref 5–8)
PLATELET # BLD: 191 K/UL (ref 135–450)
PMV BLD AUTO: 8.2 FL (ref 5–10.5)
POTASSIUM REFLEX MAGNESIUM: 4.8 MMOL/L (ref 3.5–5.1)
PROTEIN UA: NEGATIVE MG/DL
RBC # BLD: 3.51 M/UL (ref 4–5.2)
SODIUM BLD-SCNC: 140 MMOL/L (ref 136–145)
SPECIFIC GRAVITY UA: 1.01 (ref 1–1.03)
TOTAL CK: 119 U/L (ref 26–192)
TOTAL PROTEIN: 7.3 G/DL (ref 6.4–8.2)
TROPONIN: 0.01 NG/ML
TROPONIN: 0.03 NG/ML
URINE REFLEX TO CULTURE: ABNORMAL
URINE TYPE: ABNORMAL
UROBILINOGEN, URINE: 0.2 E.U./DL
WBC # BLD: 5.9 K/UL (ref 4–11)

## 2023-01-24 PROCEDURE — 6360000002 HC RX W HCPCS: Performed by: EMERGENCY MEDICINE

## 2023-01-24 PROCEDURE — 6360000002 HC RX W HCPCS: Performed by: PHYSICIAN ASSISTANT

## 2023-01-24 PROCEDURE — 99285 EMERGENCY DEPT VISIT HI MDM: CPT

## 2023-01-24 PROCEDURE — 90471 IMMUNIZATION ADMIN: CPT | Performed by: STUDENT IN AN ORGANIZED HEALTH CARE EDUCATION/TRAINING PROGRAM

## 2023-01-24 PROCEDURE — 99221 1ST HOSP IP/OBS SF/LOW 40: CPT | Performed by: PHYSICIAN ASSISTANT

## 2023-01-24 PROCEDURE — 73140 X-RAY EXAM OF FINGER(S): CPT

## 2023-01-24 PROCEDURE — 6360000002 HC RX W HCPCS: Performed by: NURSE PRACTITIONER

## 2023-01-24 PROCEDURE — 73502 X-RAY EXAM HIP UNI 2-3 VIEWS: CPT

## 2023-01-24 PROCEDURE — 2580000003 HC RX 258: Performed by: PHYSICIAN ASSISTANT

## 2023-01-24 PROCEDURE — 70450 CT HEAD/BRAIN W/O DYE: CPT

## 2023-01-24 PROCEDURE — 82550 ASSAY OF CK (CPK): CPT

## 2023-01-24 PROCEDURE — 26770 TREAT FINGER DISLOCATION: CPT

## 2023-01-24 PROCEDURE — 1200000000 HC SEMI PRIVATE

## 2023-01-24 PROCEDURE — 71045 X-RAY EXAM CHEST 1 VIEW: CPT

## 2023-01-24 PROCEDURE — 6360000002 HC RX W HCPCS: Performed by: STUDENT IN AN ORGANIZED HEALTH CARE EDUCATION/TRAINING PROGRAM

## 2023-01-24 PROCEDURE — 85025 COMPLETE CBC W/AUTO DIFF WBC: CPT

## 2023-01-24 PROCEDURE — 80053 COMPREHEN METABOLIC PANEL: CPT

## 2023-01-24 PROCEDURE — 12001 RPR S/N/AX/GEN/TRNK 2.5CM/<: CPT

## 2023-01-24 PROCEDURE — 6370000000 HC RX 637 (ALT 250 FOR IP): Performed by: NURSE PRACTITIONER

## 2023-01-24 PROCEDURE — 73110 X-RAY EXAM OF WRIST: CPT

## 2023-01-24 PROCEDURE — 96361 HYDRATE IV INFUSION ADD-ON: CPT

## 2023-01-24 PROCEDURE — 93010 ELECTROCARDIOGRAM REPORT: CPT | Performed by: INTERNAL MEDICINE

## 2023-01-24 PROCEDURE — 90715 TDAP VACCINE 7 YRS/> IM: CPT | Performed by: STUDENT IN AN ORGANIZED HEALTH CARE EDUCATION/TRAINING PROGRAM

## 2023-01-24 PROCEDURE — 2580000003 HC RX 258: Performed by: STUDENT IN AN ORGANIZED HEALTH CARE EDUCATION/TRAINING PROGRAM

## 2023-01-24 PROCEDURE — 81003 URINALYSIS AUTO W/O SCOPE: CPT

## 2023-01-24 PROCEDURE — 36415 COLL VENOUS BLD VENIPUNCTURE: CPT

## 2023-01-24 PROCEDURE — 96372 THER/PROPH/DIAG INJ SC/IM: CPT

## 2023-01-24 PROCEDURE — 96365 THER/PROPH/DIAG IV INF INIT: CPT

## 2023-01-24 PROCEDURE — 73130 X-RAY EXAM OF HAND: CPT

## 2023-01-24 PROCEDURE — 84484 ASSAY OF TROPONIN QUANT: CPT

## 2023-01-24 PROCEDURE — 2580000003 HC RX 258: Performed by: NURSE PRACTITIONER

## 2023-01-24 PROCEDURE — 93005 ELECTROCARDIOGRAM TRACING: CPT | Performed by: STUDENT IN AN ORGANIZED HEALTH CARE EDUCATION/TRAINING PROGRAM

## 2023-01-24 PROCEDURE — 72125 CT NECK SPINE W/O DYE: CPT

## 2023-01-24 PROCEDURE — 96375 TX/PRO/DX INJ NEW DRUG ADDON: CPT

## 2023-01-24 RX ORDER — ASPIRIN 81 MG/1
81 TABLET, CHEWABLE ORAL DAILY
Status: DISCONTINUED | OUTPATIENT
Start: 2023-01-24 | End: 2023-01-25 | Stop reason: HOSPADM

## 2023-01-24 RX ORDER — ENOXAPARIN SODIUM 100 MG/ML
30 INJECTION SUBCUTANEOUS DAILY
Status: DISCONTINUED | OUTPATIENT
Start: 2023-01-24 | End: 2023-01-25 | Stop reason: HOSPADM

## 2023-01-24 RX ORDER — FENTANYL CITRATE 50 UG/ML
50 INJECTION, SOLUTION INTRAMUSCULAR; INTRAVENOUS ONCE
Status: COMPLETED | OUTPATIENT
Start: 2023-01-24 | End: 2023-01-24

## 2023-01-24 RX ORDER — 0.9 % SODIUM CHLORIDE 0.9 %
1000 INTRAVENOUS SOLUTION INTRAVENOUS ONCE
Status: COMPLETED | OUTPATIENT
Start: 2023-01-24 | End: 2023-01-24

## 2023-01-24 RX ORDER — SODIUM CHLORIDE 9 MG/ML
INJECTION, SOLUTION INTRAVENOUS CONTINUOUS
Status: DISCONTINUED | OUTPATIENT
Start: 2023-01-24 | End: 2023-01-25

## 2023-01-24 RX ORDER — MULTIVITAMIN WITH IRON
250 TABLET ORAL NIGHTLY
COMMUNITY

## 2023-01-24 RX ORDER — SODIUM CHLORIDE 9 MG/ML
INJECTION, SOLUTION INTRAVENOUS CONTINUOUS
Status: DISCONTINUED | OUTPATIENT
Start: 2023-01-24 | End: 2023-01-24 | Stop reason: HOSPADM

## 2023-01-24 RX ORDER — FLUTICASONE PROPIONATE 50 MCG
1 SPRAY, SUSPENSION (ML) NASAL DAILY
Status: DISCONTINUED | OUTPATIENT
Start: 2023-01-25 | End: 2023-01-25 | Stop reason: HOSPADM

## 2023-01-24 RX ORDER — SODIUM CHLORIDE 9 MG/ML
INJECTION, SOLUTION INTRAVENOUS PRN
Status: DISCONTINUED | OUTPATIENT
Start: 2023-01-24 | End: 2023-01-25 | Stop reason: HOSPADM

## 2023-01-24 RX ORDER — TRAZODONE HYDROCHLORIDE 50 MG/1
50 TABLET ORAL NIGHTLY
Status: DISCONTINUED | OUTPATIENT
Start: 2023-01-24 | End: 2023-01-25 | Stop reason: HOSPADM

## 2023-01-24 RX ORDER — OXYCODONE HYDROCHLORIDE AND ACETAMINOPHEN 5; 325 MG/1; MG/1
1 TABLET ORAL EVERY 4 HOURS PRN
Status: DISCONTINUED | OUTPATIENT
Start: 2023-01-24 | End: 2023-01-25 | Stop reason: HOSPADM

## 2023-01-24 RX ORDER — MIDAZOLAM HYDROCHLORIDE 1 MG/ML
0.5 INJECTION INTRAMUSCULAR; INTRAVENOUS ONCE
Status: COMPLETED | OUTPATIENT
Start: 2023-01-24 | End: 2023-01-24

## 2023-01-24 RX ORDER — LANOLIN ALCOHOL/MO/W.PET/CERES
200 CREAM (GRAM) TOPICAL NIGHTLY
Status: DISCONTINUED | OUTPATIENT
Start: 2023-01-24 | End: 2023-01-25 | Stop reason: HOSPADM

## 2023-01-24 RX ORDER — CEPHALEXIN 500 MG/1
500 CAPSULE ORAL 4 TIMES DAILY
Qty: 28 CAPSULE | Refills: 0 | Status: SHIPPED | OUTPATIENT
Start: 2023-01-24 | End: 2023-01-31

## 2023-01-24 RX ORDER — POLYETHYLENE GLYCOL 3350 17 G/17G
17 POWDER, FOR SOLUTION ORAL DAILY PRN
Status: DISCONTINUED | OUTPATIENT
Start: 2023-01-24 | End: 2023-01-25 | Stop reason: HOSPADM

## 2023-01-24 RX ORDER — SODIUM CHLORIDE 0.9 % (FLUSH) 0.9 %
5-40 SYRINGE (ML) INJECTION PRN
Status: DISCONTINUED | OUTPATIENT
Start: 2023-01-24 | End: 2023-01-25 | Stop reason: HOSPADM

## 2023-01-24 RX ORDER — ACETAMINOPHEN 325 MG/1
650 TABLET ORAL EVERY 6 HOURS PRN
Status: DISCONTINUED | OUTPATIENT
Start: 2023-01-24 | End: 2023-01-25 | Stop reason: HOSPADM

## 2023-01-24 RX ORDER — DONEPEZIL HYDROCHLORIDE 5 MG/1
10 TABLET, FILM COATED ORAL NIGHTLY
Status: DISCONTINUED | OUTPATIENT
Start: 2023-01-24 | End: 2023-01-25 | Stop reason: HOSPADM

## 2023-01-24 RX ORDER — ACETAMINOPHEN 650 MG/1
650 SUPPOSITORY RECTAL EVERY 6 HOURS PRN
Status: DISCONTINUED | OUTPATIENT
Start: 2023-01-24 | End: 2023-01-25 | Stop reason: HOSPADM

## 2023-01-24 RX ORDER — SODIUM CHLORIDE 0.9 % (FLUSH) 0.9 %
5-40 SYRINGE (ML) INJECTION EVERY 12 HOURS SCHEDULED
Status: DISCONTINUED | OUTPATIENT
Start: 2023-01-24 | End: 2023-01-25 | Stop reason: HOSPADM

## 2023-01-24 RX ORDER — ONDANSETRON 4 MG/1
4 TABLET, ORALLY DISINTEGRATING ORAL EVERY 8 HOURS PRN
Status: DISCONTINUED | OUTPATIENT
Start: 2023-01-24 | End: 2023-01-25 | Stop reason: HOSPADM

## 2023-01-24 RX ORDER — ATORVASTATIN CALCIUM 10 MG/1
10 TABLET, FILM COATED ORAL NIGHTLY
Status: DISCONTINUED | OUTPATIENT
Start: 2023-01-24 | End: 2023-01-25 | Stop reason: HOSPADM

## 2023-01-24 RX ORDER — M-VIT,TX,IRON,MINS/CALC/FOLIC 27MG-0.4MG
1 TABLET ORAL DAILY
COMMUNITY

## 2023-01-24 RX ORDER — CHOLECALCIFEROL (VITAMIN D3) 125 MCG
5 CAPSULE ORAL NIGHTLY
COMMUNITY

## 2023-01-24 RX ORDER — ONDANSETRON 2 MG/ML
4 INJECTION INTRAMUSCULAR; INTRAVENOUS EVERY 6 HOURS PRN
Status: DISCONTINUED | OUTPATIENT
Start: 2023-01-24 | End: 2023-01-25 | Stop reason: HOSPADM

## 2023-01-24 RX ADMIN — OXYCODONE AND ACETAMINOPHEN 1 TABLET: 5; 325 TABLET ORAL at 21:58

## 2023-01-24 RX ADMIN — ATORVASTATIN CALCIUM 10 MG: 10 TABLET, FILM COATED ORAL at 20:37

## 2023-01-24 RX ADMIN — Medication 200 MG: at 20:37

## 2023-01-24 RX ADMIN — DONEPEZIL HYDROCHLORIDE 10 MG: 5 TABLET, FILM COATED ORAL at 20:37

## 2023-01-24 RX ADMIN — TETANUS TOXOID, REDUCED DIPHTHERIA TOXOID AND ACELLULAR PERTUSSIS VACCINE, ADSORBED 0.5 ML: 5; 2.5; 8; 8; 2.5 SUSPENSION INTRAMUSCULAR at 06:46

## 2023-01-24 RX ADMIN — ENOXAPARIN SODIUM 30 MG: 100 INJECTION SUBCUTANEOUS at 14:46

## 2023-01-24 RX ADMIN — TRAZODONE HYDROCHLORIDE 50 MG: 50 TABLET ORAL at 20:37

## 2023-01-24 RX ADMIN — CEFAZOLIN 2000 MG: 1 INJECTION, POWDER, FOR SOLUTION INTRAMUSCULAR; INTRAVENOUS at 06:46

## 2023-01-24 RX ADMIN — CEFAZOLIN 1000 MG: 1 INJECTION, POWDER, FOR SOLUTION INTRAMUSCULAR; INTRAVENOUS at 18:02

## 2023-01-24 RX ADMIN — MIDAZOLAM 0.5 MG: 1 INJECTION INTRAMUSCULAR; INTRAVENOUS at 07:33

## 2023-01-24 RX ADMIN — SODIUM CHLORIDE: 9 INJECTION, SOLUTION INTRAVENOUS at 14:52

## 2023-01-24 RX ADMIN — OXYCODONE AND ACETAMINOPHEN 1 TABLET: 5; 325 TABLET ORAL at 17:52

## 2023-01-24 RX ADMIN — FENTANYL CITRATE 50 MCG: 50 INJECTION INTRAMUSCULAR; INTRAVENOUS at 07:33

## 2023-01-24 RX ADMIN — SODIUM CHLORIDE 1000 ML: 9 INJECTION, SOLUTION INTRAVENOUS at 06:46

## 2023-01-24 ASSESSMENT — PAIN DESCRIPTION - LOCATION
LOCATION: HAND
LOCATION: HAND

## 2023-01-24 ASSESSMENT — PAIN SCALES - PAIN ASSESSMENT IN ADVANCED DEMENTIA (PAINAD)
CONSOLABILITY: 1
NEGVOCALIZATION: 1
TOTALSCORE: 3
FACIALEXPRESSION: 0
BODYLANGUAGE: 1
BREATHING: 0

## 2023-01-24 ASSESSMENT — PAIN SCALES - GENERAL
PAINLEVEL_OUTOF10: 7
PAINLEVEL_OUTOF10: 10
PAINLEVEL_OUTOF10: 8
PAINLEVEL_OUTOF10: 8
PAINLEVEL_OUTOF10: 5

## 2023-01-24 ASSESSMENT — PAIN - FUNCTIONAL ASSESSMENT
PAIN_FUNCTIONAL_ASSESSMENT: 0-10
PAIN_FUNCTIONAL_ASSESSMENT: 0-10

## 2023-01-24 ASSESSMENT — PAIN DESCRIPTION - ORIENTATION
ORIENTATION: LEFT
ORIENTATION: LEFT

## 2023-01-24 ASSESSMENT — PAIN DESCRIPTION - PAIN TYPE: TYPE: ACUTE PAIN

## 2023-01-24 NOTE — CONSULTS
Department of Orthopedic Surgery  Physician Assistant   Consult Note        Reason for Consult:  left thumb injury  Requesting Physician: Dawson Champion MD  Date of Service: 1/24/2023 3:34 PM    CHIEF COMPLAINT:  As Above    History Obtained From:  patient, electronic medical record    HISTORY OF PRESENT ILLNESS:                The patient is a 87 y.o. female who presents with above chief complaint.  Pt sustained a fall at home last night, unwitnessed, and was found down earlier this morning and brought to the ED for evaluation.  Had an obvious deformity of the left thumb in which there was a dislocation and open wound at the IP joint.  States she is right handed.  Denies n/t in the thumb or hand today.  Denies prior injury to the hand or thumb.  Currently she has a thumb splint in place after a successful reduction and laceration repair done in the ED prior to transfer to [facility].  Received IV ABX Ancef in the ED as well.    Past Medical History:        Diagnosis Date    Arthritis     Chicken pox     Chills     CKD (chronic kidney disease) stage 3, GFR 30-59 ml/min ([facility]) 1/10/2019    Current moderate episode of major depressive disorder without prior episode ([facility]) 4/28/2022    Fever     Hip pain     Hyperlipidemia     Hypertension     Measles     Miscarriage     Osteoarthritis     Pneumonia     Poor circulation     Ringing in ears     Sacroiliitis ([facility]) 4/15/2016    Sensorineural hearing loss, bilateral 10/20/2020    Swelling of both ankles     Tonsillitis      Past Surgical History:        Procedure Laterality Date    COLONOSCOPY  11/09/2016    normal/ hemmoroids    COLONOSCOPY N/A 5/3/2021    [facility]    OTHER SURGICAL HISTORY      COLONOSCOPY WITH BIOPSY (N/A )    SHOULDER SURGERY           Medications Prior to Admission:   Prior to Admission medications    Medication Sig Start Date End Date Taking? Authorizing Provider   Multiple  Vitamins-Minerals (THERAPEUTIC MULTIVITAMIN-MINERALS) tablet Take 1 tablet by mouth daily   Yes Historical Provider, MD   melatonin 5 MG TABS tablet Take 5 mg by mouth at bedtime   Yes Historical Provider, MD   magnesium (MAGNESIUM-OXIDE) 250 MG TABS tablet Take 250 mg by mouth at bedtime   Yes Historical Provider, MD   tiZANidine (ZANAFLEX) 2 MG tablet Take 1 tablet by mouth every 8 hours as needed (muscle spasm) 1/13/23   Allison Brandon DO   hydrOXYzine HCl (ATARAX) 25 MG tablet TAKE 1 TABLET BY MOUTH ONCE DAILY AS NEEDED FOR ANXIETY 12/19/22   Allison Brandon DO   diclofenac sodium (VOLTAREN) 1 % GEL Apply 4 g topically 4 times daily as needed for Pain 11/9/22   Allison Brandon DO   traZODone (DESYREL) 50 MG tablet Take 1 tablet by mouth nightly 11/8/22   Boy Aguirre, DO   donepezil (ARICEPT) 10 MG tablet 10 mg 9/28/22   Historical Provider, MD   fluticasone (FLONASE) 50 MCG/ACT nasal spray 1 spray by Each Nostril route daily 10/18/22   Boy Aguirre DO   sertraline (ZOLOFT) 50 MG tablet Take 1.5 tablets by mouth daily  Patient taking differently: Take 100 mg by mouth daily 10/18/22 1/24/23  Allison Brandon DO   lisinopril (PRINIVIL;ZESTRIL) 20 MG tablet Take 1 tablet by mouth once daily 10/17/22   Boy Aguirre, DO   atorvastatin (LIPITOR) 10 MG tablet Take 1 tablet by mouth once daily 6/20/22   Boy Aguirre,    Handicap Placard MISC by Does not apply route 04/07/22 4/7/22   Allison Brandon DO   diclofenac sodium 1 % GEL Apply 4 g topically 4 times daily 4/12/19   Cleveland Mayorga MD   Cholecalciferol (VITAMIN D) 2000 units CAPS capsule Take by mouth    Historical Provider, MD   aspirin 81 MG tablet Take 81 mg by mouth daily. Historical Provider, MD       Allergies:  Codeine, Erythromycin, and Toprol xl [metoprolol]    Social History:    Tobacco:  reports that she has never smoked. She has never used smokeless tobacco.   Alcohol:  reports no history of alcohol use.    Illicit Drug: No  Family History:       Problem Relation Age of Onset    Heart Disease Mother     Stroke Mother     High Blood Pressure Mother     Arthritis Mother     High Blood Pressure Brother        REVIEW OF SYSTEMS:    CONSTITUTIONAL:  negative  MUSCULOSKELETAL:  positive for  pain  All other systems reviewed and negative    PHYSICAL EXAM:    awake, alert, cooperative, no apparent distress, and appears stated age  MUSCULOSKELETAL:  there is no redness, warmth, or swelling of the joints  full range of motion noted  motor strength is 5 out of 5 all extremities bilaterally  tone is normal  with exception of the left hand. Thumb with repaired laceration noted palmar aspect of the thumb near the IP joint with minimal oozing on dressing of blood. Has pain with movement of the IP joint but able to move the MCP joint today. Sensation grossly intact to touch in the thumb. Cap refill intact. Dressing left in place and splint kept on at this time. No pain in the wrist or other areas of the hand. DATA:    CBC:   Recent Labs     01/24/23  0600   WBC 5.9   HGB 10.3*        BMP:    Recent Labs     01/24/23  0600      K 4.8      CO2 23   BUN 45*   CREATININE 1.8*   GLUCOSE 125*     INR: No results for input(s): INR in the last 72 hours. Radiology:   No orders to display    Left hand xray:   Complete dorsal dislocation of the IP joint of left thumb. No obvious   fracture of bones in left thumb. No additional fracture or dislocation in left hand or in the left wrist.       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 IMPRESSION/RECOMMENDATIONS:    Assessment: Left thumb open dislocation of the distal phalanx with possible proximal avulsion fracture    Plan:  1) Patient will continue with 24hrs of IV ABX while admitted and 7 days course of keflex at discharge due to open wound (script sent to [facility]).   Cna continue with the thumb splint and local dressing change, wound care until f/u but should wear this splint at all times until follow up. Will have her f/u with our hand team following discharge. No plans for any surgical intervention at this time. Reviewed case with Dr Melida Dias today. Thank you for the opportunity to consult on this patient.     Antoni Poole

## 2023-01-24 NOTE — PROGRESS NOTES
Occupational Therapy/Physical Therapy  OT/PT orders received. Hold for Ortho consult.    Radha Found OT  Trisha Chávez PT

## 2023-01-24 NOTE — PROGRESS NOTES
Pharmacy Note - Renal dose adjustment made per P/T protocol    Original order:  Cefazolin 1g IV Q 8 hours    Estimated Creatinine Clearance: 19 mL/min (A) (based on SCr of 1.8 mg/dL (H)). Recent Labs     01/24/23  0600   BUN 45*   CREATININE 1.8*          Renally adjusted order:  Cefazolin 1g IV Q 12 hours for CrCl 11-29 mL/min    Please call pharmacy with any questions.     Thank you,  Rosy Pozo, [facility]  1/24/2023 3:54 PM

## 2023-01-24 NOTE — ED PROVIDER NOTES
6:53 AM: I discussed the history, physical examination, laboratory and imaging studies, and treatment plan with Dr. Eric Camp. Lety Jiménez was signed out to me in stable condition. Please see Dr. Elissa Fothergill documentation for details of their history, physical, and laboratory studies. Upon re-examination, a summary of Naomi Singh's history, physical examination, and studies are as follows: Patient presenting with left wrist and thumb pain status post fall. Patient unable to really give much history unfortunately and had a caretaker at home at the time who is not currently present at the bedside. Daughter is present at bedside and assist with history given to me, as well as history given from my colleague Dr. Eric Camp at time of signout. Is an unwitnessed fall but is not believe that the patient was on the ground for long period of time. She has an open wound to her left thumb with obvious protruding bone but no active bleeding. Plain films are pending for evaluation of fracture versus dislocation of that thumb. Her tetanus has been updated, she has been initiated on IV fluid bolus given finding of DELMAR with a creatinine of 1.8 on lab work. She was also given dose of Ancef. CT scan of the head and C-spine also pending in addition to plain films of the wrist..    On exam: Patient is awake and alert with GCS 15. She has a 1.5 cm laceration to the palmar surface of the left thumb at the IP joint with exposed bone that is smooth and appears to be the distal aspect of the proximal phalange without any active bleeding.   I do not visualize the tendon initially on my initial exam.    Results for orders placed or performed during the hospital encounter of 01/24/23   CBC with Auto Differential   Result Value Ref Range    WBC 5.9 4.0 - 11.0 K/uL    RBC 3.51 (L) 4.00 - 5.20 M/uL    Hemoglobin 10.3 (L) 12.0 - 16.0 g/dL    Hematocrit 31.2 (L) 36.0 - 48.0 %    MCV 88.8 80.0 - 100.0 fL    MCH 29.3 26.0 - 34.0 pg    MCHC 33.0 31.0 - 36.0 g/dL    RDW 15.0 12.4 - 15.4 %    Platelets 011 066 - 429 K/uL    MPV 8.2 5.0 - 10.5 fL    Neutrophils % 69.9 %    Lymphocytes % 21.9 %    Monocytes % 5.9 %    Eosinophils % 1.7 %    Basophils % 0.6 %    Neutrophils Absolute 4.1 1.7 - 7.7 K/uL    Lymphocytes Absolute 1.3 1.0 - 5.1 K/uL    Monocytes Absolute 0.3 0.0 - 1.3 K/uL    Eosinophils Absolute 0.1 0.0 - 0.6 K/uL    Basophils Absolute 0.0 0.0 - 0.2 K/uL   Comprehensive Metabolic Panel w/ Reflex to MG   Result Value Ref Range    Sodium 140 136 - 145 mmol/L    Potassium reflex Magnesium 4.8 3.5 - 5.1 mmol/L    Chloride 103 99 - 110 mmol/L    CO2 23 21 - 32 mmol/L    Anion Gap 14 3 - 16    Glucose 125 (H) 70 - 99 mg/dL    BUN 45 (H) 7 - 20 mg/dL    Creatinine 1.8 (H) 0.6 - 1.2 mg/dL    Est, Glom Filt Rate 27 (A) >60    Calcium 10.2 8.3 - 10.6 mg/dL    Total Protein 7.3 6.4 - 8.2 g/dL    Albumin 4.4 3.4 - 5.0 g/dL    Albumin/Globulin Ratio 1.5 1.1 - 2.2    Total Bilirubin 0.3 0.0 - 1.0 mg/dL    Alkaline Phosphatase 62 40 - 129 U/L    ALT 17 10 - 40 U/L    AST 26 15 - 37 U/L   Troponin   Result Value Ref Range    Troponin 0.03 (H) <0.01 ng/mL   CK   Result Value Ref Range    Total  26 - 192 U/L   EKG 12 Lead   Result Value Ref Range    Ventricular Rate 56 BPM    Atrial Rate 56 BPM    P-R Interval 186 ms    QRS Duration 88 ms    Q-T Interval 430 ms    QTc Calculation (Bazett) 414 ms    P Axis 80 degrees    R Axis 78 degrees    T Axis 92 degrees    Diagnosis       Sinus bradycardiaOtherwise normal ECGWhen compared with ECG of 31-JUL-2022 15:09,No significant change was foundConfirmed by Ej Ross ([id number]) on 1/24/2023 10:35:31 AM     Imaging:  I personally reviewed patient's plain films of the left hand/thumb which initially revealed dorsal dislocation of the left thumb interphalangeal joint.   Postreduction films also reviewed by me reveals interval reduction of the dislocation with restored alignment of the joint but now it appears to have multiple fracture fragments now noted. Formal Radiologist reads:  XR WRIST LEFT (MIN 3 VIEWS)    Result Date: 1/24/2023  EXAMINATION: 3 XRAY VIEWS OF THE LEFT WRIST 1/24/2023 4:48 am COMPARISON: None HISTORY: ORDERING SYSTEM PROVIDED HISTORY: fall TECHNOLOGIST PROVIDED HISTORY: Reason for exam:->fall Reason for Exam: fall, pt complains of lt thumb pain FINDINGS: Moderate soft tissue swelling at the dorsal aspect of the left wrist. No evidence of fracture or osseous malalignment at the left wrist or in the visualized distal left radius and ulna. No acute fracture or osseous malalignment at the left wrist.     XR HAND LEFT (MIN 3 VIEWS)    Result Date: 1/24/2023  EXAMINATION: THREE XRAY VIEWS OF THE LEFT HAND 1/24/2023 4:47 am COMPARISON: None HISTORY: ORDERING SYSTEM PROVIDED HISTORY: fall TECHNOLOGIST PROVIDED HISTORY: Reason for exam:->fall Reason for Exam: fall, pt complains of lt thumb pain FINDINGS: There is dorsal dislocation of the IP joint of left thumb with dorsal displacement of base of the distal phalanx. There is no obvious fracture in the bones of left thumb. Mild osteoarthritic changes noted at 1st metacarpophalangeal joint and the 1st carpometacarpal joint. At the PIP joint of the left index finger there are moderate to severe osteoarthritic changes. Mild-to-moderate osteoarthritic changes at the DIP joints of the 2nd, 3rd and 4th fingers. No fracture in the carpal bones. No dislocation at the carpometacarpal joint. Complete dorsal dislocation of the IP joint of left thumb. No obvious fracture of bones in left thumb. No additional fracture or dislocation in left hand or in the left wrist. 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 XR HIP RIGHT (2-3 VIEWS)    Result Date: 1/24/2023  EXAMINATION: TWO XRAY VIEWS OF THE RIGHT HIP 1/24/2023 5:20 am COMPARISON: AP view of pelvis on 04/15/2016.  HISTORY: ORDERING SYSTEM PROVIDED HISTORY: pain right hip TECHNOLOGIST PROVIDED HISTORY: Reason for exam:->pain right hip Reason for Exam: fall, pt complains of lt thumb pain FINDINGS: No evidence of fracture or osseous malalignment at right hip joint. In the visualized portions of right hip bone there is no fracture. No evidence of fracture or osseous malalignment at right hip. CT HEAD WO CONTRAST    Result Date: 1/24/2023  EXAMINATION: CT OF THE HEAD WITHOUT CONTRAST  1/24/2023 4:48 am TECHNIQUE: CT of the head was performed without the administration of intravenous contrast. Automated exposure control, iterative reconstruction, and/or weight based adjustment of the mA/kV was utilized to reduce the radiation dose to as low as reasonably achievable. COMPARISON: CT scan of the head without contrast on 07/31/2022. HISTORY: ORDERING SYSTEM PROVIDED HISTORY: fall TECHNOLOGIST PROVIDED HISTORY: Reason for exam:->fall Has a \"code stroke\" or \"stroke alert\" been called? ->No Decision Support Exception - unselect if not a suspected or confirmed emergency medical condition->Emergency Medical Condition (MA) Reason for Exam: fall, pt complains of lt thumb pain FINDINGS: BRAIN/VENTRICLES: There is no acute intracranial hemorrhage, mass effect or midline shift. No abnormal extra-axial fluid collection. The gray-white differentiation is maintained without evidence of an acute infarct. No evidence of intracranial mass, subdural or epidural hematoma or subdural hygroma. Redemonstration of moderate cortical atrophic changes over the convexities of both cerebral hemispheres. Redemonstration of moderate chronic microvascular ischemic/aging changes in the periventricular and central white matter bilaterally. There is moderate central atrophy with moderately enlarged lateral ventricles. ORBITS: The visualized portion of the orbits demonstrate no acute abnormality. SINUSES: The visualized paranasal sinuses and mastoid air cells demonstrate no acute abnormality.  SOFT TISSUES/SKULL:  No acute abnormality of the visualized skull or soft tissues. No evidence of intracranial hemorrhage or any other definable acute intracranial abnormality. There is no focal abnormality or acute process in brain. Moderate cerebral atrophy redemonstrated. No evidence of fracture in the calvarium or in base of skull. CT CERVICAL SPINE WO CONTRAST    Result Date: 1/24/2023  EXAMINATION: CT OF THE CERVICAL SPINE WITHOUT CONTRAST 1/24/2023 4:48 am TECHNIQUE: CT of the cervical spine was performed without the administration of intravenous contrast. Multiplanar reformatted images are provided for review. Automated exposure control, iterative reconstruction, and/or weight based adjustment of the mA/kV was utilized to reduce the radiation dose to as low as reasonably achievable. COMPARISON: CT scan of cervical spine on 05/27/2022. HISTORY: ORDERING SYSTEM PROVIDED HISTORY: fall TECHNOLOGIST PROVIDED HISTORY: Reason for exam:->fall Decision Support Exception - unselect if not a suspected or confirmed emergency medical condition->Emergency Medical Condition (MA) Reason for Exam: fall, pt complains of lt thumb pain FINDINGS: BONES/ALIGNMENT: There is no acute fracture or traumatic malalignment. Loss of normal lordosis with development of minimal kyphotic curve in the cervical spine without malalignment. Mild loss of heights of C4, C5 and C6 vertebral bodies due to multilevel severe degenerative disc disease as also noted on previous study. Intact odontoid process and the craniovertebral junction. Intact bilateral facet joints. DEGENERATIVE CHANGES: No abnormality at C1 level. At C2-C3 level, no obvious disc disease. Mild-to-moderate facet osteoarthritis. No central stenosis. Moderate left neural foraminal stenosis. No significant right neural foraminal stenosis. At C3-C4 level, moderate degenerative disc disease and mild-to-moderate facet osteoarthritis. Mild central stenosis. Severe left neural foraminal stenosis. Moderate right neural foraminal stenosis. At C4-C5 level, severe degenerative disc disease. Moderate central stenosis. Moderate-to-severe neural foraminal stenosis bilaterally. At C5-C6 level, severe degenerative disc disease. Moderate central stenosis. Severe neural foraminal stenosis bilaterally. At C6-C7 level, moderate degenerative disc disease. Mild central stenosis. Moderate-to-severe neural foraminal stenosis bilaterally. At C7-T1 and T1-T2 levels, there is no diagnostic finding. SOFT TISSUES: There is no prevertebral soft tissue swelling. No evidence of acute fracture or osseous malalignment in cervical spine. Evidence of pre-existing severe multilevel degenerative disc disease in the midportion and lower portion of cervical spine redemonstrated. XR FINGER LEFT (MIN 2 VIEWS)    Result Date: 1/24/2023  EXAMINATION: 3 XRAY VIEWS OF THE LEFT FINGER 1/24/2023 7:55 am COMPARISON: None. HISTORY: ORDERING SYSTEM PROVIDED HISTORY: thumb s/p reduction TECHNOLOGIST PROVIDED HISTORY: Reason for exam:->thumb s/p reduction Reason for Exam: left thumb s/p reduction FINDINGS: Multiple juxta-articular soft tissue calcification measuring approximately 3.5 by 0.7 mm in greatest longitudinal and AP dimension consistent with minimally displaced intra-articular avulsion fracture of uncertain acuity. Underlying moderate-advanced degenerative arthritic change 1st and 2nd carpal-metacarpal joints, 1st metacarpal-phalangeal joint and interphalangeal joint of the left thumb also noted. No radiographic evidence of acute soft tissue abnormality. Findings consistent with multiple juxta articular, intra-articular avulsion fractures of uncertain acuity and/or degenerative arthritic change. Otherwise, no acute radiographic abnormality. XR CHEST PORTABLE    Result Date: 1/24/2023  EXAMINATION: ONE XRAY VIEW OF THE CHEST. Portable upright AP view of chest. 1/24/2023 4:48 am COMPARISON: Two views of chest on 07/31/2022. HISTORY: ORDERING SYSTEM PROVIDED HISTORY: fall TECHNOLOGIST PROVIDED HISTORY: Reason for exam:->fall Reason for Exam: fall, pt complains of lt thumb pain FINDINGS: The lungs are without acute focal process. There is no effusion or pneumothorax. The cardiomediastinal silhouette is without acute process. The osseous structures are without acute process. Redemonstration of mild-to-moderate dextroscoliosis in thoracic spine. No acute process. Procedure Note - Digital Block:  Patient offered a digital block of the left thumb for pain control. Questions sought and answered, verbal consent obtained. Digital block of affected digit performed with 1.5 cc 1% Lidocaine and 1.5 cc 0.5% Marcaine. Leonarda Turner tolerated the procedure well, no acute complications. Adequate anesthesia achieved. Procedure Note - Joint Reduction: The benefits, risks, and alternatives of left thumb IP joint reduction were discussed with the patient and daughter. Questions were sought and answered. Verbal consent was given for the procedure. Leonarda Turner was given 50 mcg of fentanyl and 0.5 mg of Versed for procedural pain control which was adequately achieved. The dislocation and/or fracture was reduced to the best of my abilities utilizing traction/countertraction following initial hyperextension of the joint. Following reduction, immobilization was performed and the extremity's neurovascular status was re-checked and was unchanged from the pre-procedure exam. The patient tolerated the procedure without complications. PROCEDURE:  LACERATION REPAIR  Leonarda Turner or their surrogate had an opportunity to ask questions, and the risks, benefits, and alternatives were discussed. The wound was prepped and draped to maintain a sterile field. It was copiously irrigated both before and after joint reduction. It was explored to its depth in a bloodless field with no sign of tendon, nerve, or vascular injury.   The tendon was noted to now be in adequate position when prior to the reduction it was displaced laterally. No foreign bodies were identified. It was closed with 3 times simple interrupted sutures with 4-0 Ethilon. There were no complications during the procedure. MDM:  Patient presenting for evaluation of injury to the left hand after a fall. It was an unwitnessed fall but does not seem to be associated with any other significant injury. Patient's thumb appears to be an open dislocation based on imaging, and after discussion of management, I did give the patient a very small amount of Versed to help just to relax her during the reduction in addition to IV fentanyl for pain control. This was not sedation patient was awake throughout the reduction. She tolerated this very well though and it was followed by a laceration repair after a very copiously irrigated both before and after the reduction. She is already received Ancef. She also has noted DELMAR with a creatinine of 1.8. Very slight elevation of her troponin at 0.03 and this can be trended but I have a lower suspicion that this is associate with ACS and feel that is likely secondary to her renal dysfunction. Her EKG showed no acute ischemic changes which I did review and was previously reviewed and interpreted by Dr. Eric Camp. At this time, I felt that inpatient admission for further IV hydration, serial troponins and renal function testing, along with orthopedic consultation was most appropriate. I did speak with both Dr. Estela Dumont and Dr. Deidre Morrow with orthopedics regarding destination location for patient's admission given the orthopedic injuries and any potential further need for orthopedic intervention and it was requested that I admit the patient to the hospitalist service at [facility] for that reason. I spoke with Dr. Staci Pelaez. We thoroughly discussed the history, physical exam, laboratory and imaging studies, as well as, emergency department course. Based upon that discussion, we've decided to admit Derrick Aguilar for further observation and evaluation of Derab Singh's DELMAR and left open thumb dislocation. FINAL IMPRESSION  1. Open dislocation of interphalangeal joint of finger    2. DELMAR (acute kidney injury) ([facility] [address]    3. Fall, initial encounter    4. Elevated troponin        Vitals:  Blood pressure (!) 102/53, pulse 62, temperature 98 °F (36.7 °C), temperature source Oral, resp. rate 15, height 5' 3\" (1.6 m), weight 113 lb (51.3 kg), SpO2 95 %, not currently breastfeeding. Final Disposition:  Lia Jj was transferred and admitted as a direct admission to [facility] in stable condition.         Ashlyn Vasquez MD  01/24/23 [id number]

## 2023-01-24 NOTE — PROGRESS NOTES
4 Eyes Skin Assessment     The patient is being assess for   Admission    I agree that 2 RN's have performed a thorough Head to Toe Skin Assessment on the patient. ALL assessment sites listed below have been assessed. Areas assessed for pressure by both nurses:   [x]   Head, Face, and Ears   [x]   Shoulders, Back, and Chest, Abdomen  [x]   Arms, Elbows, and Hands   [x]   Coccyx, Sacrum, and Ischium  [x]   Legs, Feet, and Heels        Skin Assessed Under all Medical Devices by both nurses:  None present                All Mepilex Borders were peeled back and area peeked at by both nurses:  No: none present   Please list where Mepilex Borders are located:  NA      **Blanchable redness on the coccyx area             **SHARE this note so that the co-signing nurse is able to place an eSignature**    Co-signer eSignature: Electronically signed by Nida Cho RN on 1/24/23 at 7:25 PM EST    Does the Patient have Skin Breakdown related to pressure?   No              Emeka Prevention initiated:  Yes   Wound Care Orders initiated:  No      [facility] nurse consulted for Pressure Injury (Stage 3,4, Unstageable, DTI, NWPT, Complex wounds)and New or Established Ostomies:  No      Primary Nurse eSignature: Electronically signed by Reida Kayser, RN on 1/24/23 at 6:42 PM EST

## 2023-01-24 NOTE — PROGRESS NOTES
Patient given discharge instructions. All questions and concerns were addressed. IV removed per protocol. Patient wheeled off unit with transport to facility with all patient belongings.

## 2023-01-24 NOTE — ED PROVIDER NOTES
Emergency Department Encounter    Patient: Susan Singh  MRN: [id number]  : 1935  Date of Evaluation: 2023  ED Provider:  Wade Dempsey MD    Triage Chief Complaint:   Fall (Pt sts \"the bush was in the way, and I fell\" left thumb pain/)    Cahto:  Susan Singh is a 87 y.o. female with history seen below presenting with complaints of left thumb pain after fall.  Caretaker is at bedside and most of history is obtained from caretaker.  Per caretaker patient has a history of dementia.  She stays with patient overnight.  Patient has lost her  and often goes the door to look for her .  She states that she believes patient got up to go to the door to look for her  and fell.  States the fall was unwitnessed.  Denies anticoagulation.  Patient's history is very unreliable but denies any headache, neck or back pain, chest pain or shortness of breath, abdominal pain or pain in any of her other extremities.    ROS - see HPI, below listed is current ROS at time of my eval:  Review of systems is limited secondary to patient's dementia    Past Medical History:   Diagnosis Date    Arthritis     Chicken pox     Chills     CKD (chronic kidney disease) stage 3, GFR 30-59 ml/min ([facility]) 1/10/2019    Current moderate episode of major depressive disorder without prior episode ([facility]) 2022    Fever     Hip pain     Hyperlipidemia     Hypertension     Measles     Miscarriage     Osteoarthritis     Pneumonia     Poor circulation     Ringing in ears     Sacroiliitis ([facility]) 4/15/2016    Sensorineural hearing loss, bilateral 10/20/2020    Swelling of both ankles     Tonsillitis      Past Surgical History:   Procedure Laterality Date    COLONOSCOPY  2016    normal/ hemmoroids    COLONOSCOPY N/A 5/3/2021    COLONOSCOPY WITH BIOPSY performed by Reyes Slaughter MD at [facility] ENDOSCOPY    OTHER SURGICAL HISTORY      COLONOSCOPY WITH BIOPSY (N/A )    SHOULDER SURGERY       Family History  Problem Relation Age of Onset    Heart Disease Mother     Stroke Mother     High Blood Pressure Mother     Arthritis Mother     High Blood Pressure Brother      Social History     Socioeconomic History    Marital status:      Spouse name: Not on file    Number of children: Not on file    Years of education: Not on file    Highest education level: Not on file   Occupational History    Not on file   Tobacco Use    Smoking status: Never    Smokeless tobacco: Never   Substance and Sexual Activity    Alcohol use: No    Drug use: No    Sexual activity: Yes     Partners: Male   Other Topics Concern    Not on file   Social History Narrative    Not on file     Social Determinants of Health     Financial Resource Strain: Not on file   Food Insecurity: Not on file   Transportation Needs: Not on file   Physical Activity: Unknown    Days of Exercise per Week: 0 days    Minutes of Exercise per Session: Not on file   Stress: Not on file   Social Connections: Not on file   Intimate Partner Violence: Not on file   Housing Stability: Not on file     No current facility-administered medications for this encounter.      Current Outpatient Medications   Medication Sig Dispense Refill    tiZANidine (ZANAFLEX) 2 MG tablet Take 1 tablet by mouth every 8 hours as needed (muscle spasm) 30 tablet 0    hydrOXYzine HCl (ATARAX) 25 MG tablet TAKE 1 TABLET BY MOUTH ONCE DAILY AS NEEDED FOR ANXIETY 30 tablet 0    diclofenac sodium (VOLTAREN) 1 % GEL Apply 4 g topically 4 times daily as needed for Pain 150 g 1    traZODone (DESYREL) 50 MG tablet Take 1 tablet by mouth nightly 30 tablet 0    donepezil (ARICEPT) 10 MG tablet TAKE 1/2 (ONE-HALF) TABLET BY MOUTH ONCE DAILY FOR 1 MONTH, THEN 1 TABLET ONCE DAILY      fluticasone (FLONASE) 50 MCG/ACT nasal spray 1 spray by Each Nostril route daily 16 g 5    sertraline (ZOLOFT) 50 MG tablet Take 1.5 tablets by mouth daily 135 tablet 1    lisinopril (PRINIVIL;ZESTRIL) 20 MG tablet Take 1 tablet by mouth once daily 90 tablet 1    atorvastatin (LIPITOR) 10 MG tablet Take 1 tablet by mouth once daily 90 tablet 2    Handicap Placard MISC by Does not apply route 04/07/22 1 each 0    diclofenac sodium 1 % GEL Apply 4 g topically 4 times daily 2 Tube 5    Cholecalciferol (VITAMIN D) 2000 units CAPS capsule Take by mouth      Calcium Carbonate-Vit D-Min (CALCIUM 1200 PO) Take by mouth      aspirin 81 MG tablet Take 81 mg by mouth daily. Allergies   Allergen Reactions    Codeine Hives    Toprol Xl [Metoprolol]        Nursing Notes Reviewed    Physical Exam:  Triage VS:    ED Triage Vitals [01/24/23 0536]   Enc Vitals Group      BP (!) 113/55      Heart Rate 59      Resp 16      Temp 98 °F (36.7 °C)      Temp Source Oral      SpO2 100 %      Weight 113 lb (51.3 kg)      Height 5' 3\" (1.6 m)      Head Circumference       Peak Flow       Pain Score       Pain Loc       Pain Edu? Excl. in [address]? My pulse ox interpretation is - normal    General appearance:  No acute distress. Skin:  Warm. Dry. Eye:  Extraocular movements intact. Ears, nose, mouth and throat:  Oral mucosa moist   Neck:  Trachea midline. Extremity: Exposed bone on the palmar surface of the left thumb with 1.5 cm laceration, less than 2-second cap refill, no tenderness palpation in any other region of the left patient is easily palpable distal pulses on left 2-second cap refill distal to the area of injury   Heart:  Regular rate and rhythm, normal S1 & S2, no extra heart sounds. Perfusion:  intact  Respiratory:  Lungs clear to auscultation bilaterally. Respirations nonlabored. No tenderness palpation of the chest wall  Abdominal:  Normal bowel sounds. Soft. Nontender. Non distended. No flank pain, no CVA tenderness  Back:  No CVA tenderness to palpation, no tenderness palpation over the CTL spine  Neurological:  Alert and oriented person only. No focal neuro deficits.    No facial asymmetry, normal sensation light touch of the face, extraocular movements are intact, pupils are 3 mm reactive bilaterally, no pronator drift of the bilateral upper and lower extremities, normal sensation light touch of the bilateral upper and lower extremities, normal finger-nose-finger and heel shin, no dysarthria dysphagia          Psychiatric:  Appropriate    I have reviewed and interpreted all of the currently available lab results from this visit (if applicable):  No results found for this visit on 01/24/23. Radiographs (if obtained):  Radiologist's Report Reviewed:  No results found. EKG (if obtained): (All EKG's are interpreted by myself in the absence of a cardiologist)    Sinus bradycardia, ventricular rate 56, NE interval 186, QRS duration 88, QTc 414, no significant ST elevation or depression    MDM:    82-year-old female presenting for unwitnessed fall at home. Patient has dementia. Per family patient is at baseline mental status. Patient is on aspirin but no other antiplatelets or anticoagulation. Vitals on presentation are reassuring and patient afebrile satting on room air. Blood pressure is 113/55 which in looking back in chart seems to be about baseline for patient. On exam neuro exam is nonfocal.  Abdomen soft and nontender without flank pain or CVA tenderness. Patient has no tenderness palpation over the CTL spine, no tenderness palpation of the chest wall. There is no tenderness palpation over any of her extremities other than the left wrist and left thumb. Caretaker states patient was complaining of right hip pain 2 days ago as well. EKG can be seen above. CBC reveals no leukocytosis. Hemoglobin is stable for patient. CMP reveals an DELMAR with creatinine of 1.8 with baseline around 1.2. Troponin is elevated 0.03. Patient's troponins are not normally elevated. Trend troponins may be secondary to kidney function. CT imaging and x-rays are pending. Patient given tetanus as well as Ancef in the ED.   Patient signed out to oncoming physician Dr. Catherine Baytown    Clinical Impression:  1. Open dislocation of interphalangeal joint of finger    2. DELMAR (acute kidney injury) ([facility] [address]    3. Fall, initial encounter    4. Elevated troponin            Comment: Please note this report has been produced using speech recognition software and may contain errors related to that system including errors in grammar, punctuation, and spelling, as well as words and phrases that may be inappropriate. Efforts were made to edit the dictations.         Riaz Carney MD  [id number]

## 2023-01-24 NOTE — DISCHARGE INSTRUCTIONS
Wear thumb splint at all times after discharge. Continue with daily dressing change and local wound care to the thumb. F/U with Dr Kareem Llanes in 5-7 days. Call [facility] for appointment time and date for follow up at [phone]. Finish entire course of antibiotics prescribed.

## 2023-01-24 NOTE — PLAN OF CARE
Received floor page for admission orders on direct admit.   Sent to [facility] admitting hospitalist NP.

## 2023-01-24 NOTE — CARE COORDINATION
CASE MANAGEMENT INITIAL ASSESSMENT      Reviewed chart and completed assessment with patient:none  Family present: Via phone- Edna Mckay  Explained Case Management role/services. yes    Primary contact information:[facility]    Health Care Decision Maker :   Primary Decision Maker: Daniella Dodson - Child - [phone]    Secondary Decision Maker: Preston Moctezuma - Child - [phone]          Can this person be reached and be able to respond quickly, such as within a few minutes or hours? Yes      Admit date/status:1/24/23/ In  Diagnosis:Thumb dislocation   Is this a Readmission?:  No    Readmission Screening completed?: No     Insurance:[employer] Medicare   Precert required for SNF: Yes       3 night stay required: No    Living arrangements, Adls, care needs, prior to admission:Lives alone with 24/7 private care takers. [address] at home:  Walker_X_Cane_X_RTS__ BSC__Shower Chair__  02__ HHN__ CPAP__  BiPap__  Hospital Bed__ W/C___ Other_____    Services in the home and/or outpatient, prior to admission:24/7 private duty care takers, PT/OT family unsure of the agency, but will have them resume care. Current PCP:Osmar Brandon                  Medications: Prescription coverage? Yes     Transportation needs: none         PT/OT recs:pending    Hospital Exemption Notification (HEN):needed foe SNF    Barriers to discharge:none    Plan/comments:Patient has 24/7 private duty care care takers. Family takes patient to and from the appts, and has no issues getting medications. Patient has a walker and cane but refuses to use. Will continue to follow.      Loree Dee RN

## 2023-01-24 NOTE — ED NOTES
Report called to RN at [facility]. Bedside report given to [facility]. VSS. No s/s of distress or discomfort. Daughter in room and aware of transfer. Patient being transported at this time.       Sariah Ibarra, RN  01/24/23 1976

## 2023-01-25 VITALS
BODY MASS INDEX: 20.02 KG/M2 | DIASTOLIC BLOOD PRESSURE: 65 MMHG | HEART RATE: 52 BPM | WEIGHT: 113 LBS | RESPIRATION RATE: 16 BRPM | TEMPERATURE: 97.9 F | OXYGEN SATURATION: 99 % | HEIGHT: 63 IN | SYSTOLIC BLOOD PRESSURE: 151 MMHG

## 2023-01-25 LAB
ANION GAP SERPL CALCULATED.3IONS-SCNC: 7 MMOL/L (ref 3–16)
BASOPHILS ABSOLUTE: 0 K/UL (ref 0–0.2)
BASOPHILS RELATIVE PERCENT: 0.3 %
BUN BLDV-MCNC: 30 MG/DL (ref 7–20)
CALCIUM SERPL-MCNC: 8.4 MG/DL (ref 8.3–10.6)
CHLORIDE BLD-SCNC: 109 MMOL/L (ref 99–110)
CHOLESTEROL, TOTAL: 125 MG/DL (ref 0–199)
CO2: 24 MMOL/L (ref 21–32)
CREAT SERPL-MCNC: 1.3 MG/DL (ref 0.6–1.2)
EOSINOPHILS ABSOLUTE: 0.1 K/UL (ref 0–0.6)
EOSINOPHILS RELATIVE PERCENT: 2.6 %
GFR SERPL CREATININE-BSD FRML MDRD: 40 ML/MIN/{1.73_M2}
GLUCOSE BLD-MCNC: 87 MG/DL (ref 70–99)
HCT VFR BLD CALC: 27.3 % (ref 36–48)
HDLC SERPL-MCNC: 49 MG/DL (ref 40–60)
HEMOGLOBIN: 9.1 G/DL (ref 12–16)
LDL CHOLESTEROL CALCULATED: 56 MG/DL
LYMPHOCYTES ABSOLUTE: 1.3 K/UL (ref 1–5.1)
LYMPHOCYTES RELATIVE PERCENT: 22.6 %
MCH RBC QN AUTO: 30.2 PG (ref 26–34)
MCHC RBC AUTO-ENTMCNC: 33.2 G/DL (ref 31–36)
MCV RBC AUTO: 91 FL (ref 80–100)
MONOCYTES ABSOLUTE: 0.5 K/UL (ref 0–1.3)
MONOCYTES RELATIVE PERCENT: 8.7 %
NEUTROPHILS ABSOLUTE: 3.8 K/UL (ref 1.7–7.7)
NEUTROPHILS RELATIVE PERCENT: 65.8 %
PDW BLD-RTO: 15.3 % (ref 12.4–15.4)
PLATELET # BLD: 141 K/UL (ref 135–450)
PMV BLD AUTO: 8.3 FL (ref 5–10.5)
POTASSIUM REFLEX MAGNESIUM: 4.7 MMOL/L (ref 3.5–5.1)
RBC # BLD: 3 M/UL (ref 4–5.2)
SODIUM BLD-SCNC: 140 MMOL/L (ref 136–145)
TRIGL SERPL-MCNC: 102 MG/DL (ref 0–150)
VLDLC SERPL CALC-MCNC: 20 MG/DL
WBC # BLD: 5.8 K/UL (ref 4–11)

## 2023-01-25 PROCEDURE — 97535 SELF CARE MNGMENT TRAINING: CPT

## 2023-01-25 PROCEDURE — 97116 GAIT TRAINING THERAPY: CPT

## 2023-01-25 PROCEDURE — 97167 OT EVAL HIGH COMPLEX 60 MIN: CPT

## 2023-01-25 PROCEDURE — 97530 THERAPEUTIC ACTIVITIES: CPT

## 2023-01-25 PROCEDURE — 2580000003 HC RX 258: Performed by: NURSE PRACTITIONER

## 2023-01-25 PROCEDURE — 80048 BASIC METABOLIC PNL TOTAL CA: CPT

## 2023-01-25 PROCEDURE — 97162 PT EVAL MOD COMPLEX 30 MIN: CPT

## 2023-01-25 PROCEDURE — 2580000003 HC RX 258: Performed by: PHYSICIAN ASSISTANT

## 2023-01-25 PROCEDURE — 80061 LIPID PANEL: CPT

## 2023-01-25 PROCEDURE — 85025 COMPLETE CBC W/AUTO DIFF WBC: CPT

## 2023-01-25 PROCEDURE — 6360000002 HC RX W HCPCS: Performed by: PHYSICIAN ASSISTANT

## 2023-01-25 PROCEDURE — 6360000002 HC RX W HCPCS: Performed by: NURSE PRACTITIONER

## 2023-01-25 PROCEDURE — 36415 COLL VENOUS BLD VENIPUNCTURE: CPT

## 2023-01-25 PROCEDURE — 6370000000 HC RX 637 (ALT 250 FOR IP): Performed by: NURSE PRACTITIONER

## 2023-01-25 RX ORDER — OXYCODONE HYDROCHLORIDE AND ACETAMINOPHEN 5; 325 MG/1; MG/1
1 TABLET ORAL EVERY 6 HOURS PRN
Qty: 12 TABLET | Refills: 0 | Status: SHIPPED | OUTPATIENT
Start: 2023-01-25 | End: 2023-01-28

## 2023-01-25 RX ADMIN — SODIUM CHLORIDE: 9 INJECTION, SOLUTION INTRAVENOUS at 01:00

## 2023-01-25 RX ADMIN — FLUTICASONE PROPIONATE 1 SPRAY: 50 SPRAY, METERED NASAL at 08:59

## 2023-01-25 RX ADMIN — CEFAZOLIN 1000 MG: 1 INJECTION, POWDER, FOR SOLUTION INTRAMUSCULAR; INTRAVENOUS at 06:24

## 2023-01-25 RX ADMIN — ASPIRIN 81 MG: 81 TABLET, CHEWABLE ORAL at 08:57

## 2023-01-25 RX ADMIN — ENOXAPARIN SODIUM 30 MG: 100 INJECTION SUBCUTANEOUS at 08:57

## 2023-01-25 NOTE — CARE COORDINATION
Writer notes DC order, plan per family is for patient to return home with Private 24/7 caretakers. No needs from .     Regis Fuentes RN

## 2023-01-25 NOTE — DISCHARGE INSTR - COC
Continuity of Care Form    Patient Name: Haroon Maynard   :  1935  MRN:  [id number]    Admit date:  2023  Discharge date:  ***    Code Status Order: Full Code   Advance Directives:     Admitting Physician:  Janae Hathaway MD  PCP: Tamia Epstein DO    Discharging Nurse: [facility] Care Unit/Room#: 0285/2445-85  Discharging Unit Phone Number: ***    Emergency Contact:   Extended Emergency Contact Information  Primary Emergency Contact: Waqas Gotti Phone: [phone]  Work Phone: [phone]  Relation: Child  Secondary Emergency Contact: Alfred Gibson  Mobile Phone: [phone]  Relation: Child    Past Surgical History:  Past Surgical History:   Procedure Laterality Date    COLONOSCOPY  2016    normal/ hemmoroids    COLONOSCOPY N/A 5/3/2021    COLONOSCOPY WITH BIOPSY performed by Aurelio Esposito MD at [address] [facility] [facility] WITH BIOPSY (N/A )    SHOULDER SURGERY         Immunization History:   Immunization History   Administered Date(s) Administered    COVID-19, MODERNA BLUE border, Primary or Immunocompromised, (age 12y+), IM, 100 mcg/0.5mL 2021, 2021, 2021    COVID-19, MODERNA Booster BLUE border, (age 18y+), IM, 50mcg/0.25mL 2022    COVID-19, PFIZER Bivalent BOOSTER, DO NOT Dilute, (age 12y+), IM, 30 mcg/0.3 mL 10/18/2022    Influenza Vaccine, unspecified formulation 10/05/2015, 2016    Influenza, AFLURIA (age 1 yrs+), FLUZONE, (age 10 mo+), MDV, 0.5mL 10/05/2017    Influenza, FLUAD, (age 72 y+), Adjuvanted, 0.5mL 10/01/2020, 2021, 10/18/2022    Influenza, High Dose (Fluzone 65 yrs and older) 10/11/2018    Influenza, Triv, inactivated, subunit, adjuvanted, IM (Fluad 65 yrs and older) 10/15/2019    Pneumococcal Conjugate 13-valent (Lzyuoji10) 2016    Pneumococcal Polysaccharide (Cpnrovgsc46) 2018    Tdap (Boostrix, Adacel) 2023       Active Problems:  Patient Active Problem List   Diagnosis Code    Hypertension I10    Ringing in ears H93.19    Arthritis M19.90    Hyperlipidemia E78.5    Insomnia G47.00    Pain in joint, pelvic region and thigh M25.559    Gluteal tendinitis M76.00    Osteopenia M85.80    CKD (chronic kidney disease) stage 3, GFR 30-59 ml/min ([facility]) N18.30    Sensorineural hearing loss, bilateral H90.3    Pre-diabetes R73.03    Anemia due to chronic kidney disease N18.9, D63.1    Diarrhea R19.7    Diverticulosis of colon K57.30    Microscopic colitis K52.839    Current moderate episode of major depressive disorder without prior episode ([facility] [address] F32.1    Chronic renal disease, stage III [facility]) [829116] N18.30    Thumb dislocation, unspecified laterality, initial encounter S63.106A    Open dislocation of interphalangeal joint of left thumb, initial encounter M02.620E, S61.002A       Isolation/Infection:   Isolation            No Isolation          Patient Infection Status       Infection Onset Added Last Indicated Last Indicated By Review Planned Expiration Resolved Resolved By    None active    Resolved    COVID-19 (Rule Out) 07/31/22 07/31/22 07/31/22 COVID-19, Rapid (Ordered)   07/31/22 Rule-Out Test Resulted    C-diff Rule Out 10/01/21 10/01/21 09/30/21 GI Bacterial Pathogens By PCR (Ordered)   10/02/21 Rule-Out Test Resulted    C-diff Rule Out 09/30/21 09/30/21 09/30/21 C DIFF TOXIN/ANTIGEN (Ordered)   09/30/21 Rule-Out Test Resulted    COVID-19 (Rule Out) 04/27/21 04/27/21 04/27/21 COVID-19 (Ordered)   04/28/21 Rule-Out Test Resulted    C-diff Rule Out 03/22/21 03/22/21 03/22/21 Clostridium Difficile Toxin/Antigen (Ordered)   03/22/21 Rule-Out Test Resulted            Nurse Assessment:  Last Vital Signs: BP (!) 124/58   Pulse 55   Temp 98 °F (36.7 °C) (Oral)   Resp 16   Ht 5' 3\" (1.6 m)   Wt 113 lb (51.3 kg)   SpO2 94%   BMI 20.02 kg/m²     Last documented pain score (0-10 scale): Pain Level: 10  Last Weight:   Wt Readings from Last 1 Encounters:   01/24/23 113 lb (51.3 kg)     Mental Status:  {IP PT MENTAL STATUS:53110}    IV Access:  { RJ IV ACCESS:812171621}    Nursing Mobility/ADLs:  Walking   {CHP DME BTHB:863736683}  Transfer  {CHP DME JRPQ:544873147}  Bathing  {CHP DME KLOV:237404526}  Dressing  {CHP DME YLVK:293519693}  Toileting  {CHP DME ZTQS:093266361}  Feeding  {CHP DME YISM:741598534}  Med Admin  {CHP DME MHBP:852889891}  Med Delivery   { RJ MED Delivery:452737401}    Wound Care Documentation and Therapy:        Elimination:  Continence: Bowel: {YES / OH:21520}  Bladder: {YES / HE:94036}  Urinary Catheter: {Urinary Catheter:614003157}   Colostomy/Ileostomy/Ileal Conduit: {YES / ZP:51901}       Date of Last BM: ***  No intake or output data in the 24 hours ending 23 1033  No intake/output data recorded.     Safety Concerns:     [address] Safety Concerns:852890916}    Impairments/Disabilities:      [address] Impairments/Disabilities:973131299}    Nutrition Therapy:  Current Nutrition Therapy:   [address] Diet List:199588867}    Routes of Feeding: {[facility] DME Other Feedings:195660220}  Liquids: {Slp liquid thickness:39184}  Daily Fluid Restriction: {CHP DME Yes amt example:529647926}  Last Modified Barium Swallow with Video (Video Swallowing Test): {Done Not Done YUJ:716119768}    Treatments at the Time of Hospital Discharge:   Respiratory Treatments: ***  Oxygen Therapy:  {Therapy; copd oxygen:67681}  Ventilator:    {[facility] Vent SAYL:483257664}    Rehab Therapies: {THERAPEUTIC INTERVENTION:3622535751}  Weight Bearing Status/Restrictions: [address] Weight Bearin}  Other Medical Equipment (for information only, NOT a DME order):  {EQUIPMENT:927029549}  Other Treatments: ***    Patient's personal belongings (please select all that are sent with patient):  {[facility] DME Belongings:859481278}    RN SIGNATURE:  {Esignature:316515475}    CASE MANAGEMENT/SOCIAL WORK SECTION    Inpatient Status Date: ***    Readmission Risk Assessment Score:  Readmission Risk Risk of Unplanned Readmission:  17           Discharging to Facility/ Agency   Name:   Address:  Phone:  Fax:    Dialysis Facility (if applicable)   Name:  Address:  Dialysis Schedule:  Phone:  Fax:    / signature: {Esignature:936768939}    PHYSICIAN SECTION    Prognosis: Fair    Condition at Discharge: Stable    Rehab Potential (if transferring to Rehab): {Prognosis:5138846009}    Recommended Labs or Other Treatments After Discharge:   Recommended Follow-up, Labs or Other Treatments After Discharge:    Home care   PT/OT                Physician Certification: I certify the above information and transfer of Anya Arauz  is necessary for the continuing treatment of the diagnosis listed and that she requires Home Care for greater 30 days.      Update Admission H&P: Changes in H&P as follows - dislocation to right thumb     PHYSICIAN SIGNATURE:  Electronically signed by DUY Negro CNP on 1/25/23 at 10:34 AM EST

## 2023-01-25 NOTE — PROGRESS NOTES
Occupational Therapy  Facility/Department: [place] [address] - MED SURG/ORTHO  Occupational Therapy Initial Assessment and Treatment Note 1x    Name: Chung Curiel  : 1935  MRN: [id number]  Date of Service: 2023    Discharge Recommendations:  24 hour supervision or assist      Patient Diagnosis(es): The encounter diagnosis was Open dislocation of interphalangeal joint of left thumb, initial encounter. Past Medical History:  has a past medical history of Arthritis, Chicken pox, Chills, CKD (chronic kidney disease) stage 3, GFR 30-59 ml/min ([facility]), Current moderate episode of major depressive disorder without prior episode ([facility] [address] Fever, Hip pain, Hyperlipidemia, Hypertension, Measles, Miscarriage, Osteoarthritis, Pneumonia, Poor circulation, Ringing in ears, Sacroiliitis ([facility]), Sensorineural hearing loss, bilateral, Swelling of both ankles, and Tonsillitis. Past Surgical History:  has a past surgical history that includes shoulder surgery; Colonoscopy (2016); other surgical history; and Colonoscopy (N/A, 5/3/2021). Assessment   Assessment: Pt seen for OT eval and tx after admission for L thumb dislocation and laceration d/t fall. Pt has now had thumb splint in place. She has hx of dementia and lives at home with 24/7 support. During OT session, pt was CGA for standing balance and transfers without device. Mod A provided for LE ADLs. To avoid water on L thumb, OT assisted with grooming/bathing at sink. Pt will have 24/7 support at home. She appears to be functioning at baseline. No further OT needed. Decision Making: High Complexity  REQUIRES OT FOLLOW-UP: No  Activity Tolerance  Activity Tolerance: Patient Tolerated treatment well      Restrictions  Restrictions/Precautions  Restrictions/Precautions: Fall Risk  Position Activity Restriction  Other position/activity restrictions:  Aasys, up with assist; left thumb splint    Subjective   General  Chart Reviewed: Yes, Orders, Progress Notes, Imaging  Patient assessed for rehabilitation services?: Yes  Additional Pertinent Hx: Hx dementia  Family / Caregiver Present: Yes (daughter)  Referring Practitioner: Lila Keita  Diagnosis: L thumb dislocation and laceration. s/p reduction and laceration repair  Subjective  Subjective: Pt was agreable to OT once she was more alert. She was sleeping upon approach. No signs of pain  General Comment  Comments: RN approved therapy     Social/Functional History  Social/Functional History  Lives With: Alone (24/7 caregier support)  Type of Home: House  Home Layout: Two level, Able to Live on Main level with bedroom/bathroom, Performs ADL's on one level (Bath and bed on first floor)  Home Access: Stairs to enter with rails  Entrance Stairs - Number of Steps: 1+2 with one handrail  Bathroom Shower/Tub: Tub/Shower unit  Bathroom Toilet: Standard  Bathroom Equipment: Shower chair, Grab bars in shower  Home Equipment: Walker, rolling, Cane, Rollator  Has the patient had two or more falls in the past year or any fall with injury in the past year?: Yes (2 falls this year)  ADL Assistance: Needs assistance (Assist for bathing, dressing and toileting. Pt able to feed herself.)  Homemaking Responsibilities: No  Ambulation Assistance: Needs assistance (Sup/occassional assist for walking at home. Does not use equip)  Transfer Assistance: Needs assistance  Active : No     Objective   Heart Rate: 52  Heart Rate Source: Monitor  BP: (!) 151/65  BP Location: Right upper arm  BP Method: Automatic  Patient Position: Up in chair  MAP (Calculated): 94  Resp: 16  SpO2: 99 %  O2 Device: None (Room air)         Safety Devices  Type of Devices: All fall risk precautions in place;Call light within reach; Chair alarm in place;Gait belt;Nurse notified; Patient at risk for falls; Left in chair       AROM: Generally decreased, functional  Strength: Generally decreased, functional  Coordination: Generally decreased, functional  Tone: Normal  Sensation: Intact  ADL  Grooming: Minimal assistance  Grooming Skilled Clinical Factors: Stood at sink to wash hands. OT blocked water on L thumb. UE Dressing: Moderate assistance  LE Dressing: Maximum assistance  Toileting: Moderate assistance  Toileting Skilled Clinical Factors: Performed pericare after urination while seated on toilet. Mod A provided for brief. Activity Tolerance  Activity Tolerance: Patient tolerated treatment well;Treatment limited secondary to decreased cognition  Bed mobility  Supine to Sit: Moderate assistance  Transfers  Stand Pivot Transfers: Contact guard assistance (R hand held assist)  Sit to stand: Contact guard assistance  Stand to sit: Contact guard assistance  Transfer Comments: Ambulated to/from  with CGA and no device  Vision  Vision: Within Functional Limits  Hearing  Hearing: Exceptions to [facility]  Hearing Exceptions: Hard of hearing/hearing concerns; No hearing aid  Cognition  Overall Cognitive Status: Exceptions  Arousal/Alertness: Delayed responses to stimuli  Following Commands: Follows one step commands with repetition  Attention Span: Attends with cues to redirect  Memory: Decreased recall of recent events;Decreased short term memory;Decreased long term memory;Decreased recall of biographical Information  Safety Judgement: Decreased awareness of need for safety  Problem Solving: Decreased awareness of errors  Insights: Not aware of deficits  Initiation: Requires cues for some  Sequencing: Requires cues for some  Cognition Comment: Hx dementia  Orientation  Overall Orientation Status: Impaired  Orientation Level: Oriented to person;Disoriented to situation;Disoriented to time;Disoriented to place      Education Given To: Patient; Family  Education Provided: Role of Therapy;Transfer Training;ADL Adaptive Strategies;Precautions; Family Education  Education Method: Verbal  Barriers to Learning: Cognition  Education Outcome: Verbalized understanding   Disease Specific Education: Pt educated on importance of OOB mobility, prevention of complications of bedrest, and general safety during hospitalization.  Pt verbalized understanding    AM-PAC Score      AM-PAC Inpatient Daily Activity Raw Score: 14 (01/25/23 1433)  AM-PAC Inpatient ADL T-Scale Score : 33.39 (01/25/23 1433)  ADL Inpatient CMS 0-100% Score: 59.67 (01/25/23 1433)  ADL Inpatient CMS G-Code Modifier : CK (01/25/23 1433)     Goals  Short Term Goals  Time Frame for Short Term Goals: 1x  Short Term Goal 1: Perform functional transfers with CGA--goal met 1/25  Short Term Goal 2: Perform toileting with mod A--goal met 1/25  Short Term Goal 3: Stand x2 min for ADL tasks with CGA--goal met 1/25  Patient Goals   Patient goals : Pt did not provide     Therapy Time   Individual Concurrent Group Co-treatment   Time In 1100         Time Out 1133         Minutes 33         Timed Code Treatment Minutes: 23 Minutes (10 min eval)     Laura Cyr OT

## 2023-01-25 NOTE — PROGRESS NOTES
Physical Therapy  Facility/Department: [place] [address] - MED SURG/ORTHO  Physical Therapy Initial Assessment    Name: Neisha Ramos  : 1935  MRN: [id number]  Date of Service: 2023    Discharge Recommendations:  24 hour supervision or assist   PT Equipment Recommendations  Equipment Needed: No      Patient Diagnosis(es): The encounter diagnosis was Open dislocation of interphalangeal joint of left thumb, initial encounter. Past Medical History:  has a past medical history of Arthritis, Chicken pox, Chills, CKD (chronic kidney disease) stage 3, GFR 30-59 ml/min ([facility]), Current moderate episode of major depressive disorder without prior episode ([facility] [address] Fever, Hip pain, Hyperlipidemia, Hypertension, Measles, Miscarriage, Osteoarthritis, Pneumonia, Poor circulation, Ringing in ears, Sacroiliitis ([facility]), Sensorineural hearing loss, bilateral, Swelling of both ankles, and Tonsillitis. Past Surgical History:  has a past surgical history that includes shoulder surgery; Colonoscopy (2016); other surgical history; and Colonoscopy (N/A, 5/3/2021). Assessment   Body Structures, Functions, Activity Limitations Requiring Skilled Therapeutic Intervention: Decreased functional mobility ; Decreased cognition;Decreased coordination;Decreased endurance;Decreased posture;Decreased balance;Decreased strength;Decreased safe awareness  Assessment: Pt is 81 yo female who presents with left thumb dislocation after fall at home. Pt has history of dementia and has caregivers and family that assist. Typically ambulatory without device and SBA to supervision. Mod A initially progressing to CGA with mobility. Family present and state pt safe to return home with caregivers. Pt would benefit from continued skilled therapy to address deficits.  Recommend home with 24-hr Sup/assist.  Treatment Diagnosis: impaired functional mobility  Specific Instructions for Next Treatment: progress mobility as tolerated  Therapy Prognosis: Good  Decision Making: Medium Complexity  Requires PT Follow-Up: Yes  Activity Tolerance  Activity Tolerance: Patient tolerated treatment well;Treatment limited secondary to decreased cognition     Plan   Physcial Therapy Plan  General Plan: 2-3 times per week  Specific Instructions for Next Treatment: progress mobility as tolerated  Current Treatment Recommendations: Strengthening, Balance training, Gait training, Functional mobility training, Stair training, Neuromuscular re-education, Transfer training, Endurance training, Equipment evaluation, education, & procurement, Patient/Caregiver education & training, Safety education & training, Therapeutic activities, Home exercise program  Safety Devices  Type of Devices: All fall risk precautions in place, Call light within reach, Chair alarm in place, Gait belt, Nurse notified, Patient at risk for falls, Left in chair     Restrictions  Restrictions/Precautions  Restrictions/Precautions: Fall Risk  Position Activity Restriction  Other position/activity restrictions: Aasys, up with assist; left thumb splint     Subjective   General  Chart Reviewed: Yes  Patient assessed for rehabilitation services?: Yes  Response To Previous Treatment: Not applicable  Family / Caregiver Present: Yes (daughter)  Referring Practitioner: LILLIANA Ackerman  Referral Date : 01/25/23  Diagnosis: Left thumb open dislocation of the distal phalanx with possible proximal avulsion fracture after fall  Follows Commands: Impaired  Other (Comment): Follows most simple commands  General Comment  Comments: RN cleared pt for therapy  Subjective  Subjective: pt agreeable to therapy.  No reports of pain         Social/Functional History  Social/Functional History  Lives With: Alone (24/7 caregier support)  Type of Home: House  Home Layout: Two level, Able to Live on Main level with bedroom/bathroom, Performs ADL's on one level (Bath and bed on first floor)  Home Access: Stairs to enter with rails  Entrance Stairs - Number of Steps: 1+2 with one handrail  Bathroom Shower/Tub: Tub/Shower unit  Bathroom Toilet: Standard  Bathroom Equipment: Shower chair, Grab bars in shower  Home Equipment: Walker, rolling, Cane, Rollator  Has the patient had two or more falls in the past year or any fall with injury in the past year?: Yes (2 falls this year)  ADL Assistance: Needs assistance (Assist for bathing, dressing and toileting. Pt able to feed herself.)  Homemaking Responsibilities: No  Ambulation Assistance: Needs assistance (Sup/occassional assist for walking at home. Does not use equip)  Transfer Assistance: Needs assistance  Active : No  Vision/Hearing  Vision  Vision: Within Functional Limits  Hearing  Hearing: Exceptions to [employer]  Hearing Exceptions: Hard of hearing/hearing concerns; No hearing aid    Cognition   Orientation  Overall Orientation Status: Impaired     Objective   Heart Rate: 52  Heart Rate Source: Monitor  BP: (!) 151/65  BP Location: Right upper arm  BP Method: Automatic  Patient Position: Up in chair  MAP (Calculated): 94  Resp: 16  SpO2: 99 %  O2 Device: None (Room air)        Gross Assessment  AROM: Generally decreased, functional  Strength: Generally decreased, functional  Coordination: Generally decreased, functional                 Bed Mobility Training  Bed Mobility Training: No    Balance  Sitting: Impaired  Sitting - Static: Good (unsupported)  Sitting - Dynamic: Fair (occasional)  Standing: Impaired  Standing - Static: Fair  Standing - Dynamic: Fair    Transfer Training  Transfer Training: Yes  Sit to Stand: Moderate assistance (mod A initially d/t posterior lean)  Stand to Sit: Minimum assistance    Gait Training  Gait Training: Yes  Gait  Overall Level of Assistance: Moderate assistance;Contact-guard assistance; Additional time  Interventions: Safety awareness training; Tactile cues; Verbal cues  Base of Support: Narrowed  Speed/Victoria: Delayed  Gait Abnormalities: Shuffling gait;Decreased step clearance (mod A for balance with HHA initially, progressing to CGA. Cues for posture and sequencing)  Distance (ft): 10 Feet (+25)  Assistive Device: Gait belt (HHA)                         AM-PAC Score  AM-PAC Inpatient Mobility Raw Score : 17 (01/25/23 1238)  AM-PAC Inpatient T-Scale Score : 42.13 (01/25/23 1238)  Mobility Inpatient CMS 0-100% Score: 50.57 (01/25/23 1238)  Mobility Inpatient CMS G-Code Modifier : CK (01/25/23 1238)           Goals  Short Term Goals  Time Frame for Short Term Goals: 1 week (2/1) unless otherwise specified  Short Term Goal 1: Pt will be supervision with bed mobility. Short Term Goal 2: Pt will be SBA with transfers without device. Short Term Goal 3: Pt will ambulate 50 ft with SBA and no device. Short Term Goal 4: Pt will negotiate 3 stairs with rail and CGA. Short Term Goal 5: 1/30: Pt will participate in 12-15 reps of BLE exercises to promote strength  Patient Goals   Patient Goals : \"to go home\"       Education  Patient Education  Education Given To: Patient; Family  Education Provided: Role of Therapy;Plan of Care;Energy Conservation;Transfer Training; Fall Prevention Strategies  Education Provided Comments: educated on role of PT and safe mobility  Education Method: Verbal  Barriers to Learning: Cognition  Education Outcome: Verbalized understanding;Continued education needed      Therapy Time   Individual Concurrent Group Co-treatment   Time In 1100         Time Out 1133         Minutes 33         Timed Code Treatment Minutes: Sudeepter, PT, DPT  If pt is unable to be seen after this session, please let this note serve as discharge summary. Please see case management note for discharge disposition. Thank you.

## 2023-01-25 NOTE — PROGRESS NOTES
Patient instructed to follw up with PCP or ortho on 2/8 for suture removal per marcelo carmcihael verbal order

## 2023-01-25 NOTE — DISCHARGE SUMMARY
Hospital Medicine Discharge Summary    Patient ID: Haroon Maynard      Patient's PCP: Tamia Epstein DO    Admit Date: 1/24/2023     Discharge Date:   ***    Admitting Provider: Janae Hathaway MD     Discharge Provider: DUY Renteria - CNP     Discharge Diagnoses: Active Hospital Problems    Diagnosis     Thumb dislocation, unspecified laterality, initial encounter [S63.106A]      Priority: Medium    Open dislocation of interphalangeal joint of left thumb, initial encounter [M71.808Y, S61.002A]      Priority: Medium       The patient was seen and examined on day of discharge and this discharge summary is in conjunction with any daily progress note from day of discharge. Hospital Course: ***          Physical Exam Performed:     BP (!) 124/58   Pulse 55   Temp 98 °F (36.7 °C) (Oral)   Resp 16   Ht 5' 3\" (1.6 m)   Wt 113 lb (51.3 kg)   SpO2 94%   BMI 20.02 kg/m²       General appearance:  No apparent distress, appears stated age and cooperative. HEENT:  Normal cephalic, atraumatic without obvious deformity. Pupils equal, round, and reactive to light. Extra ocular muscles intact. Conjunctivae/corneas clear. Neck: Supple, with full range of motion. No jugular venous distention. Trachea midline. Respiratory:  Normal respiratory effort. Clear to auscultation, bilaterally without Rales/Wheezes/Rhonchi. Cardiovascular:  Regular rate and rhythm with normal S1/S2 without murmurs, rubs or gallops. Abdomen: Soft, non-tender, non-distended with normal bowel sounds. Musculoskeletal:  No clubbing, cyanosis or edema bilaterally. Full range of motion without deformity. Skin: Skin color, texture, turgor normal.  No rashes or lesions. Neurologic:  Neurovascularly intact without any focal sensory/motor deficits.  Cranial nerves: II-XII intact, grossly non-focal.  Psychiatric:  Alert and oriented, thought content appropriate, normal insight  Capillary Refill: Brisk,< 3 seconds   Peripheral Pulses: +2 palpable, equal bilaterally       Labs: For convenience and continuity at follow-up the following most recent labs are provided:      CBC:    Lab Results   Component Value Date/Time    WBC 5.8 01/25/2023 05:13 AM    HGB 9.1 01/25/2023 05:13 AM    HCT 27.3 01/25/2023 05:13 AM     01/25/2023 05:13 AM       Renal:    Lab Results   Component Value Date/Time     01/25/2023 05:13 AM    K 4.7 01/25/2023 05:13 AM     01/25/2023 05:13 AM    CO2 24 01/25/2023 05:13 AM    BUN 30 01/25/2023 05:13 AM    CREATININE 1.3 01/25/2023 05:13 AM    CALCIUM 8.4 01/25/2023 05:13 AM    PHOS 3.7 02/18/2022 10:12 AM         Significant Diagnostic Studies    Radiology:   No orders to display          Consults:     IP CONSULT TO ORTHOPEDIC SURGERY    Disposition:  ***     Condition at Discharge: [address] [place] [address] Patient Condition:[id number]}    Discharge Instructions/Follow-up:  ***    Code Status:  Full Code ***    Activity: activity as tolerated    Diet: {diet:80620}      Discharge Medications:     Current Discharge Medication List             Details   oxyCODONE-acetaminophen (PERCOCET) 5-325 MG per tablet Take 1 tablet by mouth every 6 hours as needed for Pain for up to 3 days.  Max Daily Amount: 4 tablets  Qty: 12 tablet, Refills: 0    Comments: Reduce doses taken as pain becomes manageable  Associated Diagnoses: Open dislocation of interphalangeal joint of left thumb, initial encounter      cephALEXin (KEFLEX) 500 MG capsule Take 1 capsule by mouth 4 times daily for 7 days  Qty: 28 capsule, Refills: 0                Details   Multiple Vitamins-Minerals (THERAPEUTIC MULTIVITAMIN-MINERALS) tablet Take 1 tablet by mouth daily      melatonin 5 MG TABS tablet Take 5 mg by mouth at bedtime      magnesium (MAGNESIUM-OXIDE) 250 MG TABS tablet Take 250 mg by mouth at bedtime      tiZANidine (ZANAFLEX) 2 MG tablet Take 1 tablet by mouth every 8 hours as needed (muscle spasm)  Qty: 30 tablet, Refills: 0    Associated Diagnoses: Spondylosis of lumbar region without myelopathy or radiculopathy      hydrOXYzine HCl (ATARAX) 25 MG tablet TAKE 1 TABLET BY MOUTH ONCE DAILY AS NEEDED FOR ANXIETY  Qty: 30 tablet, Refills: 0    Associated Diagnoses: Caregiver stress      diclofenac sodium (VOLTAREN) 1 % GEL Apply 4 g topically 4 times daily as needed for Pain  Qty: 150 g, Refills: 1    Comments: OK to substitute for available tube size  Associated Diagnoses: Arthritis      traZODone (DESYREL) 50 MG tablet Take 1 tablet by mouth nightly  Qty: 30 tablet, Refills: 0    Associated Diagnoses: Insomnia, unspecified type      donepezil (ARICEPT) 10 MG tablet 10 mg      fluticasone (FLONASE) 50 MCG/ACT nasal spray 1 spray by Each Nostril route daily  Qty: 16 g, Refills: 5    Associated Diagnoses: Non-seasonal allergic rhinitis, unspecified trigger      sertraline (ZOLOFT) 50 MG tablet Take 1.5 tablets by mouth daily  Qty: 135 tablet, Refills: 1    Associated Diagnoses: Current moderate episode of major depressive disorder without prior episode (HCC)      lisinopril (PRINIVIL;ZESTRIL) 20 MG tablet Take 1 tablet by mouth once daily  Qty: 90 tablet, Refills: 1      atorvastatin (LIPITOR) 10 MG tablet Take 1 tablet by mouth once daily  Qty: 90 tablet, Refills: 2      Handicap Placard MISC by Does not apply route 04/07/22  Qty: 1 each, Refills: 0    Associated Diagnoses: Pain in joint involving pelvic region and thigh, unspecified laterality; Arthritis      diclofenac sodium 1 % GEL Apply 4 g topically 4 times daily  Qty: 2 Tube, Refills: 5      Cholecalciferol (VITAMIN D) 2000 units CAPS capsule Take by mouth      aspirin 81 MG tablet Take 81 mg by mouth daily.             Time Spent on discharge: *** in the examination, evaluation, counseling and review of medications and discharge plan.      Signed:    DUY Oliva - CNP   1/25/2023      Thank you Osmar Brandon DO for the opportunity to be involved in this patient's care. If you have any  questions or concerns, please feel free to contact me at [phone].

## 2023-01-25 NOTE — PROGRESS NOTES
Family requesting lisinopril, reached out to provider about request, lisinopril is currently on hold due to patient having an DELMAR

## 2023-01-25 NOTE — PLAN OF CARE
Problem: Discharge Planning  Goal: Discharge to home or other facility with appropriate resources  1/25/2023 1220 by Danny Hart RN  Outcome: Completed  1/25/2023 0132 by Lupis Duran RN  Outcome: Progressing     Problem: Skin/Tissue Integrity  Goal: Absence of new skin breakdown  Description: 1. Monitor for areas of redness and/or skin breakdown  2. Assess vascular access sites hourly  3. Every 4-6 hours minimum:  Change oxygen saturation probe site  4. Every 4-6 hours:  If on nasal continuous positive airway pressure, respiratory therapy assess nares and determine need for appliance change or resting period.   1/25/2023 1220 by Danny Hart RN  Outcome: Completed  1/25/2023 0132 by Lupis Duran RN  Outcome: Progressing     Problem: ABCDS Injury Assessment  Goal: Absence of physical injury  1/25/2023 1220 by Danny Hart RN  Outcome: Completed  1/25/2023 0132 by Lupis Duran RN  Outcome: Progressing     Problem: Safety - Adult  Goal: Free from fall injury  1/25/2023 1220 by Danny Hart RN  Outcome: Completed  1/25/2023 0132 by Lupis Duran RN  Outcome: Progressing     Problem: Pain  Goal: Verbalizes/displays adequate comfort level or baseline comfort level  1/25/2023 1220 by Danny Hart RN  Outcome: Completed  1/25/2023 0132 by Lupis Duran RN  Outcome: Progressing

## 2023-01-25 NOTE — PROGRESS NOTES
Hospitalist Progress Note      PCP: Gunner Gotti DO    Date of Admission: 1/24/2023    Chief [facility]: [facility] Course: ***     Subjective: ***       Medications:  Reviewed    Infusion Medications    sodium chloride      sodium chloride 100 mL/hr at 01/25/23 0100     Scheduled Medications    aspirin  81 mg Oral Daily    atorvastatin  10 mg Oral Nightly    donepezil  10 mg Oral Nightly    fluticasone  1 spray Each Nostril Daily    magnesium oxide  200 mg Oral Nightly    sertraline  100 mg Oral Daily    traZODone  50 mg Oral Nightly    sodium chloride flush  5-40 mL IntraVENous 2 times per day    enoxaparin  30 mg SubCUTAneous Daily     PRN Meds: sodium chloride flush, sodium chloride, ondansetron **OR** ondansetron, polyethylene glycol, acetaminophen **OR** acetaminophen, oxyCODONE-acetaminophen    No intake or output data in the 24 hours ending 01/25/23 0951    Physical Exam Performed:    BP (!) 124/58   Pulse 55   Temp 98 °F (36.7 °C) (Oral)   Resp 16   Ht 5' 3\" (1.6 m)   Wt 113 lb (51.3 kg)   SpO2 94%   BMI 20.02 kg/m²     General appearance: No apparent distress, appears stated age and cooperative. HEENT: Pupils equal, round, and reactive to light. Conjunctivae/corneas clear. Neck: Supple, with full range of motion. No jugular venous distention. Trachea midline. Respiratory:  Normal respiratory effort. Clear to auscultation, bilaterally without Rales/Wheezes/Rhonchi. Cardiovascular: Regular rate and rhythm with normal S1/S2 without murmurs, rubs or gallops. Abdomen: Soft, non-tender, non-distended with normal bowel sounds. Musculoskeletal: No clubbing, cyanosis or edema bilaterally. Full range of motion without deformity. Skin: Skin color, texture, turgor normal.  No rashes or lesions. Neurologic:  Neurovascularly intact without any focal sensory/motor deficits.  Cranial nerves: II-XII intact, grossly non-focal.  Psychiatric: Alert and oriented, thought content appropriate, normal insight  Capillary Refill: Brisk, 3 seconds, normal   Peripheral Pulses: +2 palpable, equal bilaterally       Labs:   Recent Labs     01/24/23  0600 01/25/23  0513   WBC 5.9 5.8   HGB 10.3* 9.1*   HCT 31.2* 27.3*    141     Recent Labs     01/24/23  0600 01/25/23  0513    140   K 4.8 4.7    109   CO2 23 24   BUN 45* 30*   CREATININE 1.8* 1.3*   CALCIUM 10.2 8.4     Recent Labs     01/24/23  0600   AST 26   ALT 17   BILITOT 0.3   ALKPHOS 62     No results for input(s): INR in the last 72 hours. Recent Labs     01/24/23  0600 01/24/23  0700 01/24/23  1311   CKTOTAL  --  119  --    TROPONINI 0.03*  --  0.01       Urinalysis:      Lab Results   Component Value Date/Time    NITRU Negative 01/24/2023 04:45 PM    WBCUA 0-2 07/31/2022 02:30 PM    BACTERIA Rare 02/18/2022 10:12 AM    RBCUA 0-2 07/31/2022 02:30 PM    BLOODU Negative 01/24/2023 04:45 PM    SPECGRAV 1.015 01/24/2023 04:45 PM    GLUCOSEU Negative 01/24/2023 04:45 PM       Radiology:  No orders to display       IP CONSULT TO ORTHOPEDIC SURGERY    Assessment/Plan:    Active Hospital Problems    Diagnosis     Thumb dislocation, unspecified laterality, initial encounter [S63.106A]      Priority: Medium    Open dislocation of interphalangeal joint of left thumb, initial encounter [K61.403Z, S61.002A]      Priority: Medium         DVT Prophylaxis: ***  Diet: ADULT DIET;  Regular  Code Status: Full Code  PT/OT Eval Status: ***    Dispo - ***    Appropriate for A1 Discharge Unit: {YES/NO:[id number]}      Joni Hughes, APRN - CNP

## 2023-01-26 ENCOUNTER — CARE COORDINATION (OUTPATIENT)
Dept: CASE MANAGEMENT | Age: 88
End: 2023-01-26

## 2023-01-26 NOTE — CARE COORDINATION
[facility] Care Transitions Initial Follow Up Call    Call within 2 business days of discharge: Yes    Patient: Velasquez Ecahvarria Patient : 1935   MRN: [id number]  Reason for Admission:   Discharge Date: 23 RARS: Readmission Risk Score: 12.2      Last Discharge  Street       Date Complaint Diagnosis Description Type Department Provider    23  Open dislocation of interphalangeal joint of left thumb, initial encounter Admission (Discharged) Pee Man MD    23 Fall Open dislocation of interphalangeal joint of finger . .. ED (TRANSFER) [address] [place] [address] ED Dequan Krishnamurthy MD; Sherman Blood... HIPAA form verified in system for daughter- Pat Can Master    Attempted to reach patient via phone for initial post hospital transition call. VM left stating purpose of call along with my contact information requesting a return call.       Care Transitions 24 Hour Call    Care Transitions Interventions         Follow Up  Future Appointments   Date Time Provider Alejandra Castillo   2023 11:30 AM DO SIRIA Sneed RN

## 2023-01-27 ENCOUNTER — CARE COORDINATION (OUTPATIENT)
Dept: CASE MANAGEMENT | Age: 88
End: 2023-01-27

## 2023-01-27 DIAGNOSIS — S63.106A: Primary | ICD-10-CM

## 2023-01-27 PROCEDURE — 1111F DSCHRG MED/CURRENT MED MERGE: CPT | Performed by: STUDENT IN AN ORGANIZED HEALTH CARE EDUCATION/TRAINING PROGRAM

## 2023-01-27 NOTE — CARE COORDINATION
[facility] Care Transitions Initial Follow Up Call    Call within 2 business days of discharge: Yes    Care Transition Nurse contacted the family by telephone to perform post hospital discharge assessment. Verified name and  with family as identifiers. Provided introduction to self, and explanation of the Care Transition Nurse role. Patient: Anya Arauz Patient : 1935   MRN: [id number]  Reason for Admission: Thumb dislocation  Discharge Date: 23 RARS: Readmission Risk Score: 12.2      Last Discharge  [address]       Date Complaint Diagnosis Description Type Department Provider    23  Open dislocation of interphalangeal joint of left thumb, initial encounter Admission (Discharged) Rochelle Nielsen MD    23 Fall Open dislocation of interphalangeal joint of finger . .. ED (TRANSFER) [address] [place] [address] ED Farzaneh Nathan MD; Carolyne Pall... Was this an external facility discharge? No Discharge Facility: [facility]    Challenges to be reviewed by the provider   Additional needs identified to be addressed with provider: Yes  Talked to daughter, pt may need referral for home care when they return from [facility] for PT. She will call the office if needed. States she has an ortho for pt to see. Method of communication with provider: chart routing. Talked to daughter after HIPPA verification. Both daughters on form. Explained role to daughter who stated pt has almost 24/hr care. She states they are leaving for [facility] tomorrow, taking pt and doesn't feel the need for more calls unless \"you can give me services\". CTN asked what would be needed and she states maybe PT. CTN stated could order then they return from [facility] through PCP or the ortho. She states she will handle. She also stated that she has an ortho for pt to see and would arrange. End of call stated no need for more calls.      Care Transition Nurse reviewed discharge instructions with family who verbalized understanding. The family was given an opportunity to ask questions and does not have any further questions or concerns at this time. Were discharge instructions available to patient? Yes. Reviewed appropriate site of care based on symptoms and resources available to patient including: PCP  Specialist. The family agrees to contact the PCP office for questions related to their healthcare. Advance Care Planning:   Does patient have an Advance Directive: reviewed and current. Medication reconciliation was performed with family, who verbalizes understanding of administration of home medications. Medications reviewed, 1111F entered: yes    Was patient discharged with a pulse oximeter? no    Non-face-to-face services provided:  Obtained and reviewed discharge summary and/or continuity of care documents    Offered patient enrollment in the Remote Patient Monitoring (RPM) program for in-home monitoring:  did not offer . Care Transitions 24 Hour Call    Do you have a copy of your discharge instructions?: Yes  Do you have all of your prescriptions and are they filled?: Yes  Have you been contacted by a [address]?: No  Do you feel like you have everything you need to keep you well at home?: Yes  Care Transitions Interventions  No Identified Needs         Follow Up  Future Appointments   Date Time Provider Alejandra Castillo   2/1/2023 11:30 AM DO RIA [address] Transition Nurse provided contact information. No further follow-up call indicated based on severity of symptoms and risk factors.       Adri Villasenor RN

## 2023-02-20 DIAGNOSIS — G47.00 INSOMNIA, UNSPECIFIED TYPE: ICD-10-CM

## 2023-02-21 RX ORDER — TRAZODONE HYDROCHLORIDE 50 MG/1
50 TABLET ORAL NIGHTLY
Qty: 90 TABLET | Refills: 1 | Status: SHIPPED | OUTPATIENT
Start: 2023-02-21

## 2023-03-10 ENCOUNTER — TELEPHONE (OUTPATIENT)
Dept: FAMILY MEDICINE CLINIC | Age: 88
End: 2023-03-10

## 2023-04-01 ENCOUNTER — HOSPITAL ENCOUNTER (EMERGENCY)
Age: 88
Discharge: HOME | End: 2023-04-01
Attending: EMERGENCY MEDICINE
Payer: MEDICARE

## 2023-04-01 ENCOUNTER — APPOINTMENT (OUTPATIENT)
Dept: GENERAL RADIOLOGY | Age: 88
End: 2023-04-01
Payer: MEDICARE

## 2023-04-01 VITALS
HEART RATE: 58 BPM | SYSTOLIC BLOOD PRESSURE: 138 MMHG | BODY MASS INDEX: 20.02 KG/M2 | OXYGEN SATURATION: 100 % | RESPIRATION RATE: 16 BRPM | TEMPERATURE: 97.6 F | WEIGHT: 113 LBS | DIASTOLIC BLOOD PRESSURE: 70 MMHG

## 2023-04-01 DIAGNOSIS — S42.211A CLOSED FRACTURE OF NECK OF RIGHT HUMERUS, INITIAL ENCOUNTER: Primary | ICD-10-CM

## 2023-04-01 PROCEDURE — 73030 X-RAY EXAM OF SHOULDER: CPT

## 2023-04-01 PROCEDURE — 99283 EMERGENCY DEPT VISIT LOW MDM: CPT

## 2023-04-01 PROCEDURE — 6370000000 HC RX 637 (ALT 250 FOR IP): Performed by: EMERGENCY MEDICINE

## 2023-04-01 PROCEDURE — 73060 X-RAY EXAM OF HUMERUS: CPT

## 2023-04-01 RX ORDER — OXYCODONE HYDROCHLORIDE 5 MG/1
10 TABLET ORAL ONCE
Status: COMPLETED | OUTPATIENT
Start: 2023-04-01 | End: 2023-04-01

## 2023-04-01 RX ORDER — OXYCODONE HYDROCHLORIDE AND ACETAMINOPHEN 5; 325 MG/1; MG/1
1 TABLET ORAL EVERY 6 HOURS PRN
Qty: 20 TABLET | Refills: 0 | Status: SHIPPED | OUTPATIENT
Start: 2023-04-01 | End: 2023-04-06

## 2023-04-01 RX ADMIN — OXYCODONE 10 MG: 5 TABLET ORAL at 19:47

## 2023-04-01 ASSESSMENT — PAIN DESCRIPTION - LOCATION
LOCATION: ARM
LOCATION: ARM

## 2023-04-01 ASSESSMENT — PAIN DESCRIPTION - DESCRIPTORS
DESCRIPTORS: ACHING
DESCRIPTORS: ACHING

## 2023-04-01 ASSESSMENT — PAIN - FUNCTIONAL ASSESSMENT
PAIN_FUNCTIONAL_ASSESSMENT: PREVENTS OR INTERFERES SOME ACTIVE ACTIVITIES AND ADLS
PAIN_FUNCTIONAL_ASSESSMENT: 0-10

## 2023-04-01 ASSESSMENT — PAIN DESCRIPTION - ORIENTATION
ORIENTATION: RIGHT
ORIENTATION: RIGHT

## 2023-04-01 ASSESSMENT — PAIN SCALES - GENERAL
PAINLEVEL_OUTOF10: 9
PAINLEVEL_OUTOF10: 9

## 2023-04-01 ASSESSMENT — PAIN DESCRIPTION - PAIN TYPE: TYPE: ACUTE PAIN

## 2023-04-01 NOTE — ED PROVIDER NOTES
humeral area. No additional extremity tenderness in the patient's right forearm, shoulder or elbow joints specifically, pelvis or lower extremities. SKIN: Warm and dry. No acute rashes. NEUROLOGICAL: Alert and oriented. CN's 2-12 intact. No gross facial drooping. Sensory intact in the radial, median, ulnar nerve distribution distally in the right upper extremity. PSYCHIATRIC: Normal mood and affect. LABS  I have reviewed all labs for this visit. No results found for this visit on 04/01/23. RADIOLOGY  XR SHOULDER RIGHT (MIN 2 VIEWS)    Result Date: 4/1/2023  EXAMINATION: TWO XRAY VIEWS OF THE RIGHT SHOULDER 4/1/2023 8:03 pm COMPARISON: Same day right humerus radiographs HISTORY: ORDERING SYSTEM PROVIDED HISTORY: possible anterior dislocation TECHNOLOGIST PROVIDED HISTORY: Reason for exam:->possible anterior dislocation Reason for Exam: possible anterior dislocation FINDINGS: Redemonstrated fracture of the right humeral neck which is likely mildly comminuted without significant angulation of the fracture components. The glenohumeral joint is well aligned on this radiograph. The acromioclavicular joint is maintained. Mild osteoarthritis is present. The included lungs are clear. Mild soft tissue swelling is seen about the right shoulder. Redemonstrated acute minimally displaced fracture of the right humeral neck which is likely mildly comminuted. No evidence of right shoulder dislocation. XR HUMERUS RIGHT (MIN 2 VIEWS)    Result Date: 4/1/2023  EXAMINATION: TWO XRAY VIEWS OF THE RIGHT HUMERUS 4/1/2023 7:13 pm COMPARISON: None HISTORY: ORDERING SYSTEM PROVIDED HISTORY: fall, mid upper arm pain TECHNOLOGIST PROVIDED HISTORY: Reason for exam:->fall, mid upper arm pain Reason for Exam: Fall FINDINGS: There is an acute, minimally displaced and impacted fracture of the right humeral neck. No significant angulation of the fracture components.   The humeral head appears inferiorly subluxed in comparison with the glenoid and there is questioned anterior shoulder dislocation. The acromioclavicular joint appears intact. The included lungs are clear. No visualized rib fracture. There is an acute, mildly displaced right humeral neck fracture. There is inferior subluxation of the humeral head in relation to the glenoid and questioned anterior shoulder dislocation. Consider dedicated right shoulder radiographs. Point of care ultrasound  No results found. ED COURSE/MDM  Patient with mechanical fall with isolated right arm injury. She is neurovascularly intact distally. Plain films reveal a proximal humeral fracture of the humeral neck. No dislocation noted on dedicated x-ray of the shoulder. She is feeling improved after administration of oral oxycodone. At this time, will place in a sling and refer to orthopedics. I did communicate with Dr. Christophe Francis who was in agreement of plan for sling and discharge as long as patient is neurovascularly intact which she has. Patient given referral to follow-up with orthopedics, and does have 24-hour care therefore will need assistance with ambulation from her caretakers given that she typically ambulates with a walker. Care discussed with daughter who is at the bedside. She will also be discharged with small amount of Percocet for breakthrough pain and we discussed use of a stool softener alongside the Percocet. Patient will be discharged at this time. I have personally reviewed right wrist and shoulder xray  images and radiology confirms the interpretation:  There is a proximal humeral fracture that is slightly displaced. I do not appreciate shoulder dislocation at all. Sepsis:  Is this patient to be included in the SEP-1 Core Measure due to severe sepsis or septic shock? No   Exclusion criteria - the patient is NOT to be included for SEP-1 Core Measure due to:   Infection is not suspected       Labs and reviewed and results discussed

## 2023-04-02 NOTE — ED NOTES
Pt personal clothing removed and hospital gown given before sling placed. Pt placed in wheelchair and assisted to car via wheelchair.        Lore Duran RN  04/01/23 7602

## 2023-04-07 ENCOUNTER — OFFICE VISIT (OUTPATIENT)
Dept: ORTHOPEDIC SURGERY | Age: 88
End: 2023-04-07

## 2023-04-07 VITALS — WEIGHT: 113 LBS | HEIGHT: 63 IN | BODY MASS INDEX: 20.02 KG/M2

## 2023-04-07 DIAGNOSIS — M19.90 ARTHRITIS: ICD-10-CM

## 2023-04-07 DIAGNOSIS — M25.511 ACUTE PAIN OF RIGHT SHOULDER: ICD-10-CM

## 2023-04-07 DIAGNOSIS — S42.201A CLOSED FRACTURE OF PROXIMAL END OF RIGHT HUMERUS, UNSPECIFIED FRACTURE MORPHOLOGY, INITIAL ENCOUNTER: Primary | ICD-10-CM

## 2023-04-07 NOTE — PROGRESS NOTES
Dr Luz Marina Grijalva      Date /Time 4/7/2023       1:00 PM EDT  Name Jefferson Carl             1935   Location  [facility]  MRN [id number]                Chief Complaint   Patient presents with    Arm Pain     Right arm pain fall  Er 4/1/2023  Has sling  Daughter states she fell again this morning        History of Present Illness  Jefferson Carl is a 82 y.o. female who presents for ED follow up from R proximal humerus fx. Has been in a sling. Mylene Chock again today, but didn't have any specific areas of increased pain. No focal neuro complaints distally. Pain isolated to R shoulder today. Occasionally uses walker for ambulation and concerns about not being able to use this. Otherwise doing OK at home with family support. Just taking tylenol for pain control.        Past History  Past Medical History:   Diagnosis Date    Arthritis     Chicken pox     Chills     CKD (chronic kidney disease) stage 3, GFR 30-59 ml/min ([facility]) 1/10/2019    Current moderate episode of major depressive disorder without prior episode ([facility] [address] 4/28/2022    Fever     Hip pain     Hyperlipidemia     Hypertension     Measles     Miscarriage     Osteoarthritis     Pneumonia     Poor circulation     Ringing in ears     Sacroiliitis ([facility] [address] 4/15/2016    Sensorineural hearing loss, bilateral 10/20/2020    Swelling of both ankles     Tonsillitis      Past Surgical History:   Procedure Laterality Date    COLONOSCOPY  11/09/2016    normal/ hemmoroids    COLONOSCOPY N/A 5/3/2021    COLONOSCOPY WITH BIOPSY performed by Smita Bobby MD at [address] [facility] WITH BIOPSY (N/A )    SHOULDER SURGERY       Social History     Tobacco Use    Smoking status: Never    Smokeless tobacco: Never   Substance Use Topics    Alcohol use: No      Current Outpatient Medications on File Prior to Visit   Medication Sig Dispense Refill    traZODone (DESYREL) 50 MG tablet Take 1 tablet by mouth nightly 90

## 2023-04-23 ENCOUNTER — HOSPITAL ENCOUNTER (EMERGENCY)
Age: 88
Discharge: HOME | End: 2023-04-23
Payer: MEDICARE

## 2023-04-23 VITALS
HEART RATE: 57 BPM | SYSTOLIC BLOOD PRESSURE: 133 MMHG | TEMPERATURE: 98.1 F | OXYGEN SATURATION: 97 % | RESPIRATION RATE: 16 BRPM | DIASTOLIC BLOOD PRESSURE: 57 MMHG | HEIGHT: 60 IN | BODY MASS INDEX: 22.07 KG/M2

## 2023-04-23 DIAGNOSIS — Z51.89 VISIT FOR WOUND CHECK: Primary | ICD-10-CM

## 2023-04-23 PROCEDURE — 99282 EMERGENCY DEPT VISIT SF MDM: CPT

## 2023-04-23 ASSESSMENT — ENCOUNTER SYMPTOMS
NAUSEA: 0
BACK PAIN: 1
FACIAL SWELLING: 0
EYE REDNESS: 0
COLOR CHANGE: 0
APNEA: 0
VOMITING: 0
ABDOMINAL PAIN: 0
CHOKING: 0
EYE DISCHARGE: 0

## 2023-04-23 ASSESSMENT — PAIN - FUNCTIONAL ASSESSMENT: PAIN_FUNCTIONAL_ASSESSMENT: NONE - DENIES PAIN

## 2023-05-05 ENCOUNTER — OFFICE VISIT (OUTPATIENT)
Dept: ORTHOPEDIC SURGERY | Age: 88
End: 2023-05-05

## 2023-05-05 VITALS — BODY MASS INDEX: 22.19 KG/M2 | HEIGHT: 60 IN | WEIGHT: 113 LBS

## 2023-05-05 DIAGNOSIS — S42.201A CLOSED FRACTURE OF PROXIMAL END OF RIGHT HUMERUS, UNSPECIFIED FRACTURE MORPHOLOGY, INITIAL ENCOUNTER: Primary | ICD-10-CM

## 2023-05-05 NOTE — PROGRESS NOTES
History: 82-year-old female presents for follow-up of right proximal humerus fracture. This was sustained on 2023. At her last visit which was her first visit with me I noted a nondisplaced proximal humerus fracture and recommended coming out of sling for pendulum exercises as pain allows and wearing the sling for comfort only. Since that time she has been wearing a sling the majority of the time unfortunately. She has been using Tylenol for pain control. She states she does not have much shoulder pain at this time. She does endorse some anterior elbow pain. Exam: Sling is removed. She does have stiffness of the elbow lacking about 10 degrees of extension and noting pain to the anterior elbow at terminal extension. She does have intact forward elevation but this is very weak but it is painless. She tolerates passive rotation of the shoulder without pain. No neurovascular compromise distally. Imagin views of the right shoulder ordered obtained interpreted reviewed and show healing proximal humerus fracture with continued deltoid atony. Assessment: 82-year-old female with healing right proximal humerus fracture. Significant right upper extremity weakness. Right elbow stiffness. Plan: We will initiate home PT to help with right upper extremity rehab. I personally threw away the sling today. Tylenol as needed for pain control. Follow-up in a month to assess progress with home PT.     Divina Schmidt MD

## 2023-09-19 ENCOUNTER — TELEPHONE (OUTPATIENT)
Dept: FAMILY MEDICINE CLINIC | Age: 88
End: 2023-09-19

## 2023-10-17 ENCOUNTER — APPOINTMENT (OUTPATIENT)
Dept: CT IMAGING | Age: 88
End: 2023-10-17
Payer: MEDICARE

## 2023-10-17 ENCOUNTER — HOSPITAL ENCOUNTER (EMERGENCY)
Age: 88
Discharge: HOME | End: 2023-10-17
Payer: MEDICARE

## 2023-10-17 VITALS
RESPIRATION RATE: 16 BRPM | OXYGEN SATURATION: 100 % | TEMPERATURE: 98.4 F | DIASTOLIC BLOOD PRESSURE: 67 MMHG | SYSTOLIC BLOOD PRESSURE: 155 MMHG | HEART RATE: 76 BPM

## 2023-10-17 DIAGNOSIS — S09.90XA INJURY OF HEAD, INITIAL ENCOUNTER: ICD-10-CM
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
## 2023-10-17 DIAGNOSIS — S00.83XA CONTUSION OF FACE, INITIAL ENCOUNTER: ICD-10-CM

## 2023-10-17 LAB
BILIRUB UR QL STRIP.AUTO: NEGATIVE
CLARITY UR: CLEAR
COLOR UR: YELLOW
EPI CELLS #/AREA URNS HPF: NORMAL /HPF (ref 0–5)
GLUCOSE UR STRIP.AUTO-MCNC: NEGATIVE MG/DL
HGB UR QL STRIP.AUTO: ABNORMAL
KETONES UR STRIP.AUTO-MCNC: NEGATIVE MG/DL
LEUKOCYTE ESTERASE UR QL STRIP.AUTO: NEGATIVE
NITRITE UR QL STRIP.AUTO: NEGATIVE
PH UR STRIP.AUTO: 6 [PH] (ref 5–8)
PROT UR STRIP.AUTO-MCNC: NEGATIVE MG/DL
RBC #/AREA URNS HPF: NORMAL /HPF (ref 0–4)
SP GR UR STRIP.AUTO: 1.02 (ref 1–1.03)
UA COMPLETE W REFLEX CULTURE PNL UR: ABNORMAL
UA DIPSTICK W REFLEX MICRO PNL UR: YES
URN SPEC COLLECT METH UR: ABNORMAL
UROBILINOGEN UR STRIP-ACNC: 0.2 E.U./DL
WBC #/AREA URNS HPF: NORMAL /HPF (ref 0–5)

## 2023-10-17 PROCEDURE — 72125 CT NECK SPINE W/O DYE: CPT

## 2023-10-17 PROCEDURE — 81001 URINALYSIS AUTO W/SCOPE: CPT

## 2023-10-17 PROCEDURE — 99284 EMERGENCY DEPT VISIT MOD MDM: CPT

## 2023-10-17 PROCEDURE — 70486 CT MAXILLOFACIAL W/O DYE: CPT

## 2023-10-17 PROCEDURE — 70450 CT HEAD/BRAIN W/O DYE: CPT

## 2023-10-18 NOTE — ED NOTES
RN ambulated pt to the restroom utilizing assistive device (walker) which is pts baseline. Pt tolerated ambulation well with no complaints. Pt ambulated back to bed.  RN will continue to monitor      Deyvi Camara RN  10/17/23 7898

## 2023-10-18 NOTE — ED NOTES
RN discussed discharge instructions with pt and caregiver including follow up. Pt and caregiver verbalized understanding. All questions were answered. Pt left stable pushed out in wheelchair with caregiver and all belongings including personal walker.        Shanon Lee RN  10/17/23 7571

## 2024-01-28 ENCOUNTER — HOSPITAL ENCOUNTER (INPATIENT)
Age: 89
LOS: 2 days | Discharge: HOME | DRG: 057 | End: 2024-01-30
Attending: EMERGENCY MEDICINE | Admitting: INTERNAL MEDICINE
Payer: MEDICARE

## 2024-01-28 ENCOUNTER — APPOINTMENT (OUTPATIENT)
Dept: CT IMAGING | Age: 89
DRG: 057 | End: 2024-01-28
Payer: MEDICARE

## 2024-01-28 ENCOUNTER — APPOINTMENT (OUTPATIENT)
Dept: GENERAL RADIOLOGY | Age: 89
DRG: 057 | End: 2024-01-28
Payer: MEDICARE

## 2024-01-28 DIAGNOSIS — G93.40 ACUTE ENCEPHALOPATHY: Primary | ICD-10-CM

## 2024-01-28 LAB
ALBUMIN SERPL-MCNC: 4.2 G/DL (ref 3.4–5)
ALBUMIN/GLOB SERPL: 1.3 {RATIO} (ref 1.1–2.2)
ALP SERPL-CCNC: 81 U/L (ref 40–129)
ALT SERPL-CCNC: 10 U/L (ref 10–40)
AMPHETAMINES UR QL SCN>1000 NG/ML: NORMAL
ANION GAP SERPL CALCULATED.3IONS-SCNC: 10 MMOL/L (ref 3–16)
AST SERPL-CCNC: 18 U/L (ref 15–37)
BARBITURATES UR QL SCN>200 NG/ML: NORMAL
BASOPHILS # BLD: 0 K/UL (ref 0–0.2)
BASOPHILS NFR BLD: 0.8 %
BENZODIAZ UR QL SCN>200 NG/ML: NORMAL
BILIRUB SERPL-MCNC: 0.3 MG/DL (ref 0–1)
BILIRUB UR QL STRIP.AUTO: NEGATIVE
BUN SERPL-MCNC: 36 MG/DL (ref 7–20)
CALCIUM SERPL-MCNC: 9.8 MG/DL (ref 8.3–10.6)
CANNABINOIDS UR QL SCN>50 NG/ML: NORMAL
CHLORIDE SERPL-SCNC: 104 MMOL/L (ref 99–110)
CLARITY UR: CLEAR
CO2 SERPL-SCNC: 24 MMOL/L (ref 21–32)
COCAINE UR QL SCN: NORMAL
COLOR UR: YELLOW
CREAT SERPL-MCNC: 1.5 MG/DL (ref 0.6–1.2)
DEPRECATED RDW RBC AUTO: 16.1 % (ref 12.4–15.4)
DRUG SCREEN COMMENT UR-IMP: NORMAL
EOSINOPHIL # BLD: 0.1 K/UL (ref 0–0.6)
EOSINOPHIL NFR BLD: 3 %
FENTANYL SCREEN, URINE: NORMAL
FLUAV RNA RESP QL NAA+PROBE: NOT DETECTED
FLUBV RNA RESP QL NAA+PROBE: NOT DETECTED
GFR SERPLBLD CREATININE-BSD FMLA CKD-EPI: 34 ML/MIN/{1.73_M2}
GLUCOSE SERPL-MCNC: 81 MG/DL (ref 70–99)
GLUCOSE UR STRIP.AUTO-MCNC: NEGATIVE MG/DL
HCT VFR BLD AUTO: 34.4 % (ref 36–48)
HGB BLD-MCNC: 11 G/DL (ref 12–16)
HGB UR QL STRIP.AUTO: NEGATIVE
KETONES UR STRIP.AUTO-MCNC: NEGATIVE MG/DL
LACTATE BLDV-SCNC: 0.9 MMOL/L (ref 0.4–1.9)
LEUKOCYTE ESTERASE UR QL STRIP.AUTO: NEGATIVE
LIPASE SERPL-CCNC: 29 U/L (ref 13–60)
LYMPHOCYTES # BLD: 1.9 K/UL (ref 1–5.1)
LYMPHOCYTES NFR BLD: 37.9 %
MAGNESIUM SERPL-MCNC: 2.5 MG/DL (ref 1.8–2.4)
MCH RBC QN AUTO: 29.2 PG (ref 26–34)
MCHC RBC AUTO-ENTMCNC: 32 G/DL (ref 31–36)
MCV RBC AUTO: 91.3 FL (ref 80–100)
METHADONE UR QL SCN>300 NG/ML: NORMAL
MONOCYTES # BLD: 0.4 K/UL (ref 0–1.3)
MONOCYTES NFR BLD: 8.5 %
NEUTROPHILS # BLD: 2.5 K/UL (ref 1.7–7.7)
NEUTROPHILS NFR BLD: 49.8 %
NITRITE UR QL STRIP.AUTO: NEGATIVE
OPIATES UR QL SCN>300 NG/ML: NORMAL
OXYCODONE UR QL SCN: NORMAL
PCP UR QL SCN>25 NG/ML: NORMAL
PH UR STRIP.AUTO: 7 [PH] (ref 5–8)
PH UR STRIP: 7 [PH]
PLATELET # BLD AUTO: 195 K/UL (ref 135–450)
PMV BLD AUTO: 8.9 FL (ref 5–10.5)
POTASSIUM SERPL-SCNC: 5.5 MMOL/L (ref 3.5–5.1)
PROCALCITONIN SERPL IA-MCNC: 0.08 NG/ML (ref 0–0.15)
PROT SERPL-MCNC: 7.4 G/DL (ref 6.4–8.2)
PROT UR STRIP.AUTO-MCNC: NEGATIVE MG/DL
RBC # BLD AUTO: 3.77 M/UL (ref 4–5.2)
SARS-COV-2 RNA RESP QL NAA+PROBE: NOT DETECTED
SODIUM SERPL-SCNC: 138 MMOL/L (ref 136–145)
SP GR UR STRIP.AUTO: 1.01 (ref 1–1.03)
UA DIPSTICK W REFLEX MICRO PNL UR: NORMAL
URN SPEC COLLECT METH UR: NORMAL
UROBILINOGEN UR STRIP-ACNC: 0.2 E.U./DL
WBC # BLD AUTO: 4.9 K/UL (ref 4–11)

## 2024-01-28 PROCEDURE — 80307 DRUG TEST PRSMV CHEM ANLYZR: CPT

## 2024-01-28 PROCEDURE — 81003 URINALYSIS AUTO W/O SCOPE: CPT

## 2024-01-28 PROCEDURE — 83605 ASSAY OF LACTIC ACID: CPT

## 2024-01-28 PROCEDURE — 70450 CT HEAD/BRAIN W/O DYE: CPT

## 2024-01-28 PROCEDURE — 2580000003 HC RX 258: Performed by: NURSE PRACTITIONER

## 2024-01-28 PROCEDURE — 83690 ASSAY OF LIPASE: CPT

## 2024-01-28 PROCEDURE — 87040 BLOOD CULTURE FOR BACTERIA: CPT

## 2024-01-28 PROCEDURE — 80053 COMPREHEN METABOLIC PANEL: CPT

## 2024-01-28 PROCEDURE — 87636 SARSCOV2 & INF A&B AMP PRB: CPT

## 2024-01-28 PROCEDURE — 99285 EMERGENCY DEPT VISIT HI MDM: CPT

## 2024-01-28 PROCEDURE — 1200000000 HC SEMI PRIVATE

## 2024-01-28 PROCEDURE — P9612 CATHETERIZE FOR URINE SPEC: HCPCS

## 2024-01-28 PROCEDURE — 83735 ASSAY OF MAGNESIUM: CPT

## 2024-01-28 PROCEDURE — 71045 X-RAY EXAM CHEST 1 VIEW: CPT

## 2024-01-28 PROCEDURE — 84145 PROCALCITONIN (PCT): CPT

## 2024-01-28 PROCEDURE — 2580000003 HC RX 258: Performed by: EMERGENCY MEDICINE

## 2024-01-28 PROCEDURE — 85025 COMPLETE CBC W/AUTO DIFF WBC: CPT

## 2024-01-28 RX ORDER — ACETAMINOPHEN 325 MG/1
650 TABLET ORAL EVERY 6 HOURS PRN
Status: DISCONTINUED | OUTPATIENT
Start: 2024-01-28 | End: 2024-01-30 | Stop reason: HOSPADM

## 2024-01-28 RX ORDER — SODIUM CHLORIDE 9 MG/ML
1000 INJECTION, SOLUTION INTRAVENOUS CONTINUOUS
Status: DISCONTINUED | OUTPATIENT
Start: 2024-01-28 | End: 2024-01-29

## 2024-01-28 RX ORDER — POTASSIUM CHLORIDE 20 MEQ/1
40 TABLET, EXTENDED RELEASE ORAL PRN
Status: DISCONTINUED | OUTPATIENT
Start: 2024-01-28 | End: 2024-01-29

## 2024-01-28 RX ORDER — ENOXAPARIN SODIUM 100 MG/ML
30 INJECTION SUBCUTANEOUS DAILY
Status: DISCONTINUED | OUTPATIENT
Start: 2024-01-29 | End: 2024-01-30 | Stop reason: HOSPADM

## 2024-01-28 RX ORDER — POLYETHYLENE GLYCOL 3350 17 G/17G
17 POWDER, FOR SOLUTION ORAL DAILY PRN
Status: DISCONTINUED | OUTPATIENT
Start: 2024-01-28 | End: 2024-01-30 | Stop reason: HOSPADM

## 2024-01-28 RX ORDER — POTASSIUM CHLORIDE 7.45 MG/ML
10 INJECTION INTRAVENOUS PRN
Status: DISCONTINUED | OUTPATIENT
Start: 2024-01-28 | End: 2024-01-29

## 2024-01-28 RX ORDER — DONEPEZIL HYDROCHLORIDE 5 MG/1
10 TABLET, FILM COATED ORAL NIGHTLY
Status: DISCONTINUED | OUTPATIENT
Start: 2024-01-28 | End: 2024-01-30 | Stop reason: HOSPADM

## 2024-01-28 RX ORDER — ACETAMINOPHEN 650 MG/1
650 SUPPOSITORY RECTAL EVERY 6 HOURS PRN
Status: DISCONTINUED | OUTPATIENT
Start: 2024-01-28 | End: 2024-01-30 | Stop reason: HOSPADM

## 2024-01-28 RX ORDER — ONDANSETRON 2 MG/ML
4 INJECTION INTRAMUSCULAR; INTRAVENOUS EVERY 6 HOURS PRN
Status: DISCONTINUED | OUTPATIENT
Start: 2024-01-28 | End: 2024-01-30 | Stop reason: HOSPADM

## 2024-01-28 RX ORDER — SODIUM CHLORIDE 0.9 % (FLUSH) 0.9 %
5-40 SYRINGE (ML) INJECTION EVERY 12 HOURS SCHEDULED
Status: DISCONTINUED | OUTPATIENT
Start: 2024-01-28 | End: 2024-01-30 | Stop reason: HOSPADM

## 2024-01-28 RX ORDER — MAGNESIUM SULFATE IN WATER 40 MG/ML
2000 INJECTION, SOLUTION INTRAVENOUS PRN
Status: DISCONTINUED | OUTPATIENT
Start: 2024-01-28 | End: 2024-01-29

## 2024-01-28 RX ORDER — SODIUM CHLORIDE 9 MG/ML
INJECTION, SOLUTION INTRAVENOUS PRN
Status: DISCONTINUED | OUTPATIENT
Start: 2024-01-28 | End: 2024-01-30 | Stop reason: HOSPADM

## 2024-01-28 RX ORDER — ONDANSETRON 4 MG/1
4 TABLET, ORALLY DISINTEGRATING ORAL EVERY 8 HOURS PRN
Status: DISCONTINUED | OUTPATIENT
Start: 2024-01-28 | End: 2024-01-30 | Stop reason: HOSPADM

## 2024-01-28 RX ORDER — SODIUM CHLORIDE 9 MG/ML
INJECTION, SOLUTION INTRAVENOUS CONTINUOUS
Status: DISCONTINUED | OUTPATIENT
Start: 2024-01-28 | End: 2024-01-29

## 2024-01-28 RX ORDER — SODIUM CHLORIDE 0.9 % (FLUSH) 0.9 %
5-40 SYRINGE (ML) INJECTION PRN
Status: DISCONTINUED | OUTPATIENT
Start: 2024-01-28 | End: 2024-01-30 | Stop reason: HOSPADM

## 2024-01-28 RX ADMIN — SODIUM CHLORIDE: 9 INJECTION, SOLUTION INTRAVENOUS at 23:12

## 2024-01-28 RX ADMIN — SODIUM CHLORIDE 1000 ML: 9 INJECTION, SOLUTION INTRAVENOUS at 21:41

## 2024-01-28 ASSESSMENT — PAIN SCALES - GENERAL
PAINLEVEL_OUTOF10: 0
PAINLEVEL_OUTOF10: 0

## 2024-01-28 ASSESSMENT — PAIN - FUNCTIONAL ASSESSMENT: PAIN_FUNCTIONAL_ASSESSMENT: 0-10

## 2024-01-28 NOTE — ED PROVIDER NOTES
[facility]  ED     EMERGENCY DEPARTMENT ENCOUNTER            Pt Name: Susan Singh   MRN: [id number]   Birthdate 1935   Date of evaluation: 1/28/2024   Provider: Dawson Figueroa II, DO   PCP: Osmar Brandon DO   Note Started: 6:58 PM EST 1/28/24          CHIEF COMPLAINT     Chief Complaint   Patient presents with    Altered Mental Status     Patient from [facility], sent in because she was \"unable to sit up on her own\" and wasn't responding verbally; patient resting quietly during triage, shaking head no to questions but will not open eyes, yells out to painful stimulus             HISTORY OF PRESENT ILLNESS:   History from : Patient and EMS and daughters  Limitations to history : Altered Mental Status     Susan Singh is a 88 y.o. female who presents to the emergency department from [facility].  Patient has history of mild dementia.  She was reportedly within normal limits yesterday and even had dinner with her daughters prior to returning to [facility].  Patient was last known normal approximately 10 PM last night by caregiver and noted to be extremely confused this morning.  No fevers or chills noted.  Patient arrives into the emergency department somnolent but arousable.  Confused.    Nursing Notes were all reviewed and agreed with, or any disagreements were addressed in the HPI.     REVIEW OF SYSTEMS :    Positives and Pertinent negatives as per HPI.      MEDICAL HISTORY   has a past medical history of Arthritis, Chicken pox, Chills, CKD (chronic kidney disease) stage 3, GFR 30-59 ml/min ([facility]) (1/10/2019), Current moderate episode of major depressive disorder without prior episode ([facility]) (4/28/2022), Fever, Hip pain, Hyperlipidemia, Hypertension, Measles, Miscarriage, Osteoarthritis, Pneumonia, Poor circulation, Ringing in ears, Sacroiliitis ([facility]) (4/15/2016), Sensorineural hearing loss, bilateral (10/20/2020), Swelling of both ankles, and Tonsillitis.    Past Surgical History:   Procedure

## 2024-01-29 LAB
ALBUMIN SERPL-MCNC: 4 G/DL (ref 3.4–5)
ALBUMIN/GLOB SERPL: 1.3 {RATIO} (ref 1.1–2.2)
ALP SERPL-CCNC: 76 U/L (ref 40–129)
ALT SERPL-CCNC: 9 U/L (ref 10–40)
ANION GAP SERPL CALCULATED.3IONS-SCNC: 11 MMOL/L (ref 3–16)
AST SERPL-CCNC: 17 U/L (ref 15–37)
BASOPHILS # BLD: 0 K/UL (ref 0–0.2)
BASOPHILS NFR BLD: 0.7 %
BILIRUB SERPL-MCNC: 0.3 MG/DL (ref 0–1)
BUN SERPL-MCNC: 34 MG/DL (ref 7–20)
C DIFF TOX A+B STL QL IA: NORMAL
CALCIUM SERPL-MCNC: 9.2 MG/DL (ref 8.3–10.6)
CHLORIDE SERPL-SCNC: 105 MMOL/L (ref 99–110)
CO2 SERPL-SCNC: 21 MMOL/L (ref 21–32)
CREAT SERPL-MCNC: 1.3 MG/DL (ref 0.6–1.2)
DEPRECATED RDW RBC AUTO: 15.9 % (ref 12.4–15.4)
EOSINOPHIL # BLD: 0.1 K/UL (ref 0–0.6)
EOSINOPHIL NFR BLD: 3.2 %
GFR SERPLBLD CREATININE-BSD FMLA CKD-EPI: 40 ML/MIN/{1.73_M2}
GLUCOSE SERPL-MCNC: 79 MG/DL (ref 70–99)
HCT VFR BLD AUTO: 33.2 % (ref 36–48)
HGB BLD-MCNC: 10.6 G/DL (ref 12–16)
LYMPHOCYTES # BLD: 1.3 K/UL (ref 1–5.1)
LYMPHOCYTES NFR BLD: 27.5 %
MCH RBC QN AUTO: 29.3 PG (ref 26–34)
MCHC RBC AUTO-ENTMCNC: 31.9 G/DL (ref 31–36)
MCV RBC AUTO: 91.7 FL (ref 80–100)
MONOCYTES # BLD: 0.4 K/UL (ref 0–1.3)
MONOCYTES NFR BLD: 8.3 %
NEUTROPHILS # BLD: 2.8 K/UL (ref 1.7–7.7)
NEUTROPHILS NFR BLD: 60.3 %
PLATELET # BLD AUTO: 177 K/UL (ref 135–450)
PMV BLD AUTO: 8.5 FL (ref 5–10.5)
POTASSIUM SERPL-SCNC: 4.8 MMOL/L (ref 3.5–5.1)
POTASSIUM SERPL-SCNC: 5 MMOL/L (ref 3.5–5.1)
PROT SERPL-MCNC: 7.2 G/DL (ref 6.4–8.2)
RBC # BLD AUTO: 3.62 M/UL (ref 4–5.2)
SODIUM SERPL-SCNC: 137 MMOL/L (ref 136–145)
WBC # BLD AUTO: 4.6 K/UL (ref 4–11)

## 2024-01-29 PROCEDURE — 87324 CLOSTRIDIUM AG IA: CPT

## 2024-01-29 PROCEDURE — 1200000000 HC SEMI PRIVATE

## 2024-01-29 PROCEDURE — 6370000000 HC RX 637 (ALT 250 FOR IP): Performed by: STUDENT IN AN ORGANIZED HEALTH CARE EDUCATION/TRAINING PROGRAM

## 2024-01-29 PROCEDURE — 6360000002 HC RX W HCPCS: Performed by: NURSE PRACTITIONER

## 2024-01-29 PROCEDURE — 6370000000 HC RX 637 (ALT 250 FOR IP): Performed by: NURSE PRACTITIONER

## 2024-01-29 PROCEDURE — 85025 COMPLETE CBC W/AUTO DIFF WBC: CPT

## 2024-01-29 PROCEDURE — 80053 COMPREHEN METABOLIC PANEL: CPT

## 2024-01-29 PROCEDURE — 2580000003 HC RX 258: Performed by: NURSE PRACTITIONER

## 2024-01-29 PROCEDURE — 84132 ASSAY OF SERUM POTASSIUM: CPT

## 2024-01-29 PROCEDURE — 87449 NOS EACH ORGANISM AG IA: CPT

## 2024-01-29 PROCEDURE — 87040 BLOOD CULTURE FOR BACTERIA: CPT

## 2024-01-29 PROCEDURE — 51798 US URINE CAPACITY MEASURE: CPT

## 2024-01-29 PROCEDURE — 36415 COLL VENOUS BLD VENIPUNCTURE: CPT

## 2024-01-29 RX ORDER — HYDRALAZINE HYDROCHLORIDE 20 MG/ML
10 INJECTION INTRAMUSCULAR; INTRAVENOUS EVERY 6 HOURS PRN
Status: DISCONTINUED | OUTPATIENT
Start: 2024-01-29 | End: 2024-01-30 | Stop reason: HOSPADM

## 2024-01-29 RX ORDER — MECOBALAMIN 5000 MCG
5 TABLET,DISINTEGRATING ORAL NIGHTLY
Status: DISCONTINUED | OUTPATIENT
Start: 2024-01-29 | End: 2024-01-30 | Stop reason: HOSPADM

## 2024-01-29 RX ORDER — ROPINIROLE 0.25 MG/1
0.25 TABLET, FILM COATED ORAL NIGHTLY
COMMUNITY

## 2024-01-29 RX ORDER — SERTRALINE HYDROCHLORIDE 100 MG/1
100 TABLET, FILM COATED ORAL EVERY EVENING
COMMUNITY

## 2024-01-29 RX ORDER — ALPRAZOLAM 0.25 MG/1
0.25 TABLET ORAL 3 TIMES DAILY PRN
COMMUNITY

## 2024-01-29 RX ORDER — HYDROXYZINE HYDROCHLORIDE 10 MG/1
10 TABLET, FILM COATED ORAL 3 TIMES DAILY PRN
Status: DISCONTINUED | OUTPATIENT
Start: 2024-01-29 | End: 2024-01-30 | Stop reason: HOSPADM

## 2024-01-29 RX ORDER — MIRTAZAPINE 15 MG/1
15 TABLET, FILM COATED ORAL NIGHTLY
COMMUNITY

## 2024-01-29 RX ORDER — ALPRAZOLAM 0.25 MG/1
0.25 TABLET ORAL NIGHTLY
Status: ON HOLD | COMMUNITY
End: 2024-01-30 | Stop reason: HOSPADM

## 2024-01-29 RX ADMIN — Medication 5 MG: at 21:05

## 2024-01-29 RX ADMIN — SERTRALINE HYDROCHLORIDE 100 MG: 50 TABLET ORAL at 17:36

## 2024-01-29 RX ADMIN — HYDROXYZINE HYDROCHLORIDE 10 MG: 10 TABLET, FILM COATED ORAL at 22:43

## 2024-01-29 RX ADMIN — DONEPEZIL HYDROCHLORIDE 10 MG: 5 TABLET, FILM COATED ORAL at 00:48

## 2024-01-29 RX ADMIN — DONEPEZIL HYDROCHLORIDE 10 MG: 5 TABLET, FILM COATED ORAL at 17:36

## 2024-01-29 RX ADMIN — ENOXAPARIN SODIUM 30 MG: 100 INJECTION SUBCUTANEOUS at 10:25

## 2024-01-29 RX ADMIN — Medication 10 ML: at 20:15

## 2024-01-29 ASSESSMENT — PAIN SCALES - GENERAL
PAINLEVEL_OUTOF10: 0

## 2024-01-29 NOTE — PROGRESS NOTES
Comprehensive Nutrition Assessment    Type and Reason for Visit:  Positive Nutrition Screen    Nutrition Recommendations/Plan:   Liberalize diet to VINCENT diet to encourage PO intakes.  Continue Ensure TID.     Malnutrition Assessment:  Malnutrition Status:  At risk for malnutrition (Comment) (01/29/24 1048)    Context:  Acute Illness     Findings of the 6 clinical characteristics of malnutrition:  Energy Intake:  Mild decrease in energy intake (Comment)  Weight Loss:  No significant weight loss     Body Fat Loss:  Unable to assess     Muscle Mass Loss:  Unable to assess    Fluid Accumulation:  No significant fluid accumulation     Strength:  Not Performed    Nutrition Assessment:    +nutrition screen. Pt with PMH of CKD, HLD, HTN, and underlying dementia, who presented with confusion from ECF. Pt has not been eating much lately and since admission. Pt currently with diarrhea and Cdiff r/o. IVF running @ 100 mL/hr. ONS just started this AM, no intakes yet. Per EMR, HNR=257-113#, current wt 123#. Will liberalize diet to encourage PO Intakes. Continue ONS TID.    Nutrition Related Findings:    no edema note;d BM 1/29- loose; Labs reviewed Wound Type: None       Current Nutrition Intake & Therapies:    Average Meal Intake: 0%  Average Supplements Intake: Unable to assess  ADULT DIET; Regular; Low Fat/Low Chol/High Fiber/2 gm Na  ADULT ORAL NUTRITION SUPPLEMENT; Breakfast, Lunch, Dinner; Standard High Calorie/High Protein Oral Supplement    Anthropometric Measures:  Height: 152.4 cm (5')  Ideal Body Weight (IBW): 100 lbs (45 kg)       Current Body Weight: 55.9 kg (123 lb 3.8 oz), 123.2 % IBW. Weight Source: Bed Scale  Current BMI (kg/m2): 24.1  Usual Body Weight:  (115-120#)                       BMI Categories: Normal Weight (BMI 22.0 to 24.9) age over 65    Estimated Daily Nutrient Needs:        Energy (kcal/day): 6594-9067 kcal (25-30 kcal/kg 56 kg CBW)     Protein (g/day): 56-72 gm (1-1.3 gm/kg 56 kg CBW)     Fluid

## 2024-01-29 NOTE — PROGRESS NOTES
Per night shift RN, patient unable to void during night shift. Bladder scan this morning showed 349 ml in patients bladder. Provider notified.     Order received for intermittent straight cath.     Straight cath performed using sterile technique. x3 attempts due to patient movement. 450 ml removed.

## 2024-01-29 NOTE — PROGRESS NOTES
Pt arrived to unit at about 2300. She is alert to self. Confusion at baseline. Pt oriented to call light and room. Pt denies pain from staff at the moment. Pt had small BM, loose with mucus. Placed on contact plus per protocol. Call bell in reach.

## 2024-01-29 NOTE — PROGRESS NOTES
Called [facility] and spoke to Amanda SMITH LPN, who normally cares for the patient to get further details on baselines for the patient, since she is acutely confused. Per LPN, pt is normally attempting to elope the LTC at night and sometimes during the day as a baseline behavior. She takes her medications whole. Pt had recently not been eating as much, the nurse also provided a weight of 125.02 lbs from 1/24/24. Ordered nutritional supplement per protocol. Patient placed on avasure as well for added safety. Current med list was faxed to the unit as well and placed in the chart.

## 2024-01-29 NOTE — PROGRESS NOTES
V2.0    [facility] [place]      Name:  Susan Singh /Age/Sex: 1935  (88 y.o. female)   MRN & CSN:  [id number] & [id number] Encounter Date/Time: 2024 2:15 PM EST   Location:  0339/0339-01 PCP: Osmar Brandon DO     Attending:Pritesh Segura MD       Hospital Day: 2    Assessment and Recommendations   Susan Singh is a 88 y.o. female with pmh of HTN, HLD, CKD, Alzheimer's who presents with Acute encephalopathy    Acute Encephalopathy  - likely 2/2 dementia  - appears better this morning  - routine labs, UDS, infectious w/u negative  - CT head neg  - re-eval mental status tomorrow  - continue donepezil, melatonin nightly  - avoid anticholinergics    DELMAR on CKD  - Cr baseline appears to be ~1.3  - Cr improved to 1.3 from 1.5  - likely 2/2 dehydration from poor PO intake  - discontinued IVF, continue oral hydration  - will resume lisinopril if Cr stables tomorrow    HTN  - holding lisinopril d/t DELMAR above  - hydralazine 10 mg IV prn      Diet ADULT ORAL NUTRITION SUPPLEMENT; Breakfast, Lunch, Dinner; Standard High Calorie/High Protein Oral Supplement  ADULT DIET; Regular; No Added Salt (3-4 gm)   DVT Prophylaxis [x] Lovenox, []  Heparin, [] SCDs, [] Ambulation,  [] Eliquis, [] Xarelto  [] Coumadin   Code Status DNR-CCA   Disposition From: ECF  Expected Disposition: ECF  Estimated Date of Discharge: tomorrow  Patient requires continued admission due to encephalopathy   Surrogate Decision Maker/ POA  daughter     Personally reviewed Lab Studies and Imaging       Subjective:     Chief Complaint: encephalopathy    Susan Singh is a/an 88 y.o. female with a significant past medical history of hypertension, hyperlipidemia, CKD, and underlying dementia who presents to [facility] part from a local ECF due to concerns from daughter that patient has been more somnolent and confused over baseline.  The onset was approximate 24 hours ago, last known well 10 AM 2024.

## 2024-01-29 NOTE — H&P
[]Home w/ assist  []HHC  []SNF  []Acute Rehab    Anticipated Discharge Day/Date:  01/30/2024    Anticipated Discharge Location: [x]Home  []HHC  []SNF  []Acute Rehab  []ECF  []LTAC  []Hospice    Consults:      IP CONSULT TO HOSPITALIST      This patient has a high likelihood of being discharged tomorrow and is appropriate for A1 Discharge Unit in AM pending clinical course overnight: []Yes  [x]No    --------------------------------------------------------------------------------------------------------------------------------------------------------------------    Imaging:     CT HEAD WO CONTRAST    Result Date: 1/28/2024  EXAMINATION: CT OF THE HEAD WITHOUT CONTRAST  1/28/2024 6:36 pm TECHNIQUE: CT of the head was performed without the administration of intravenous contrast. Automated exposure control, iterative reconstruction, and/or weight based adjustment of the mA/kV was utilized to reduce the radiation dose to as low as reasonably achievable. COMPARISON: 10/17/2023 HISTORY: ORDERING SYSTEM PROVIDED HISTORY: AMS TECHNOLOGIST PROVIDED HISTORY: Has a \"code stroke\" or \"stroke alert\" been called?->No Reason for exam:->AMS Decision Support Exception - unselect if not a suspected or confirmed emergency medical condition->Emergency Medical Condition (MA) Reason for Exam: AMS today per family FINDINGS: BRAIN/VENTRICLES: The ventricles and sulci are diffusely enlarged.  Low attenuation is seen in the periventricular and subcortical white matter.  No acute intracranial hemorrhage or acute infarct is identified ORBITS: The visualized portion of the orbits demonstrate no acute abnormality. SINUSES: The visualized paranasal sinuses and mastoid air cells demonstrate no acute abnormality. SOFT TISSUES/SKULL:  No acute abnormality of the visualized skull or soft tissues.     No acute intracranial abnormality.     XR CHEST PORTABLE    Result Date: 1/28/2024  EXAMINATION: ONE XRAY VIEW OF THE CHEST 1/28/2024 6:17 pm COMPARISON:

## 2024-01-29 NOTE — CARE COORDINATION
Case Management Assessment  Initial Evaluation    Date/Time of Evaluation: 1/29/2024 3:53 PM  Assessment Completed by: Marisela Peres RN    If patient is discharged prior to next notation, then this note serves as note for discharge by case management.    Patient Name: Susan Singh                   YOB: 1935  Diagnosis: Acute encephalopathy [G93.40]                   Date / Time: 1/28/2024  5:52 PM    Patient Admission Status: Inpatient   Readmission Risk (Low < 19, Mod (19-27), High > 27): Readmission Risk Score: 11.7    Current PCP: Osmar Brandon, DO  PCP verified by CM? Yes (facility MD)    Chart Reviewed: Yes      History Provided by: Child/Family  Patient Orientation: Alert and Oriented, Person    Patient Cognition: Alert    Hospitalization in the last 30 days (Readmission):  No    If yes, Readmission Assessment in  Navigator will be completed.    Advance Directives:      Code Status: Limited   Patient's Primary Decision Maker is: Legal Next of Kin    Primary Decision Maker: Arely Dickerson  Child - [phone]    Primary Decision Maker: [facility] - [phone]    Discharge Planning:    Patient lives with: Alone Type of Home: Assisted living  Primary Care Giver: Other (Comment) (from [facility])  Patient Support Systems include: Children   Current Financial resources: Medicare  Current community resources: Assisted Living  Current services prior to admission: Extended Care Facility ([facility])            Current DME:              Type of Home Care services:  OT, PT, Nursing Services    ADLS  Prior functional level: Assistance with the following:, Bathing, Dressing, Mobility, Housework, Cooking, Toileting  Current functional level: Assistance with the following:, Bathing, Dressing, Mobility, Housework, Cooking, Toileting    PT AM-PAC:   /24  OT AM-PAC:   /24    Family can provide assistance at DC: Yes  Would you like Case Management to discuss the discharge

## 2024-01-29 NOTE — ED NOTES
Per orders, completed straight cath to obtain urine sample. Used sterile techniques,pt tolerated well. Urine sent to lab

## 2024-01-29 NOTE — PROGRESS NOTES
4 Eyes Skin Assessment     The patient is being assess for  Admission    I agree that 2 RN's have performed a thorough Head to Toe Skin Assessment on the patient. ALL assessment sites listed below have been assessed.       Areas assessed by both nurses:   [x]   Head, Face, and Ears   [x]   Shoulders, Back, and Chest  [x]   Arms, Elbows, and Hands   [x]   Coccyx, Sacrum, and IschIum  [x]   Legs, Feet, and Heels        Does the Patient have Skin Breakdown?  No         Emeka Prevention initiated:  Yes   Wound Care Orders initiated:  NA      [facility] nurse consulted for Pressure Injury (Stage 3,4, Unstageable, DTI, NWPT, and Complex wounds), New and Established Ostomies:  NA      Nurse 1 eSignature: Electronically signed by Marcos Kim RN on 1/28/24 at 11:37 PM EST    **SHARE this note so that the co-signing nurse is able to place an eSignature**    Nurse 2 eSignature: Electronically signed by CASPER OAKLEY LPN on 1/29/24 at 5:21 AM EST

## 2024-01-29 NOTE — CONSULTS
Advance Care Planning     Non-Provider Advance Care Planning (ACP) Note    Date of ACP Conversation: 1/29/2024  Persons included in Conversation:  Both dtr's, Michelle and Marry  Length of ACP Conversation in minutes: 20 minutes    Conversation requested by:    CODE STATUS CLARIFICATION    Authorized Decision Maker (if patient is incapable of making informed decisions):   This person is:  Named in Advance Directive or Healthcare Power of       Primary Decision Maker: Arely Dickerson - Child - [phone]    General ACP for ALL Patients with Decision Making Capacity:    Review of Existing Advance Directive: (Select questions covered)  Pt has AD's in this EMR from 2016 & 2017.  The 2017 document designated pts spouse, Melanie Singh as her healthcare proxy and dtr, Michelle Dickerson, as the first alternate.   Have message into Michelle to return writer's call and will verify AD's at that time.      ADDENDUM:  Michelle returned call.  She shared that pts spouse has predeceased pt and that mom executed a newer HCPOA, designating Marry as the healthcare proxy.   Arely said that she does not wish to have pt intubated, even for the short term and believes Marry is of the same mindset.    Interventions Provided:  Reached out to Marry who confirmed pt having designated a newer HCPOA.   D/w Marry our need to better clarify pts wishes around short term intubation/MV support and she was clear to echo Michelle, that pt would not want intubation or being placed on vent.  Have amended to better reflect DNR/DNI

## 2024-01-30 VITALS
OXYGEN SATURATION: 99 % | WEIGHT: 123.24 LBS | BODY MASS INDEX: 24.19 KG/M2 | RESPIRATION RATE: 16 BRPM | HEART RATE: 63 BPM | TEMPERATURE: 98.1 F | DIASTOLIC BLOOD PRESSURE: 80 MMHG | HEIGHT: 60 IN | SYSTOLIC BLOOD PRESSURE: 145 MMHG

## 2024-01-30 LAB
ALBUMIN SERPL-MCNC: 4 G/DL (ref 3.4–5)
ALBUMIN/GLOB SERPL: 1.3 {RATIO} (ref 1.1–2.2)
ALP SERPL-CCNC: 76 U/L (ref 40–129)
ALT SERPL-CCNC: 9 U/L (ref 10–40)
ANION GAP SERPL CALCULATED.3IONS-SCNC: 9 MMOL/L (ref 3–16)
AST SERPL-CCNC: 15 U/L (ref 15–37)
BASOPHILS # BLD: 0 K/UL (ref 0–0.2)
BASOPHILS NFR BLD: 0.5 %
BILIRUB SERPL-MCNC: <0.2 MG/DL (ref 0–1)
BUN SERPL-MCNC: 34 MG/DL (ref 7–20)
CALCIUM SERPL-MCNC: 9.3 MG/DL (ref 8.3–10.6)
CHLORIDE SERPL-SCNC: 108 MMOL/L (ref 99–110)
CO2 SERPL-SCNC: 22 MMOL/L (ref 21–32)
CREAT SERPL-MCNC: 1.5 MG/DL (ref 0.6–1.2)
DEPRECATED RDW RBC AUTO: 15.4 % (ref 12.4–15.4)
EOSINOPHIL # BLD: 0.1 K/UL (ref 0–0.6)
EOSINOPHIL NFR BLD: 2.8 %
GFR SERPLBLD CREATININE-BSD FMLA CKD-EPI: 34 ML/MIN/{1.73_M2}
GLUCOSE SERPL-MCNC: 98 MG/DL (ref 70–99)
HCT VFR BLD AUTO: 31.7 % (ref 36–48)
HGB BLD-MCNC: 10.4 G/DL (ref 12–16)
LYMPHOCYTES # BLD: 1.1 K/UL (ref 1–5.1)
LYMPHOCYTES NFR BLD: 22.3 %
MCH RBC QN AUTO: 29.7 PG (ref 26–34)
MCHC RBC AUTO-ENTMCNC: 32.7 G/DL (ref 31–36)
MCV RBC AUTO: 90.8 FL (ref 80–100)
MONOCYTES # BLD: 0.4 K/UL (ref 0–1.3)
MONOCYTES NFR BLD: 8.8 %
NEUTROPHILS # BLD: 3.2 K/UL (ref 1.7–7.7)
NEUTROPHILS NFR BLD: 65.6 %
PLATELET # BLD AUTO: 170 K/UL (ref 135–450)
PMV BLD AUTO: 9 FL (ref 5–10.5)
POTASSIUM SERPL-SCNC: 5.1 MMOL/L (ref 3.5–5.1)
PROT SERPL-MCNC: 7 G/DL (ref 6.4–8.2)
RBC # BLD AUTO: 3.49 M/UL (ref 4–5.2)
SODIUM SERPL-SCNC: 139 MMOL/L (ref 136–145)
WBC # BLD AUTO: 4.8 K/UL (ref 4–11)

## 2024-01-30 PROCEDURE — 6360000002 HC RX W HCPCS: Performed by: NURSE PRACTITIONER

## 2024-01-30 PROCEDURE — 85025 COMPLETE CBC W/AUTO DIFF WBC: CPT

## 2024-01-30 PROCEDURE — 36415 COLL VENOUS BLD VENIPUNCTURE: CPT

## 2024-01-30 PROCEDURE — 80053 COMPREHEN METABOLIC PANEL: CPT

## 2024-01-30 PROCEDURE — 6370000000 HC RX 637 (ALT 250 FOR IP): Performed by: STUDENT IN AN ORGANIZED HEALTH CARE EDUCATION/TRAINING PROGRAM

## 2024-01-30 RX ORDER — LISINOPRIL 20 MG/1
20 TABLET ORAL DAILY
Status: DISCONTINUED | OUTPATIENT
Start: 2024-01-30 | End: 2024-01-30 | Stop reason: HOSPADM

## 2024-01-30 RX ADMIN — LISINOPRIL 20 MG: 20 TABLET ORAL at 11:46

## 2024-01-30 RX ADMIN — ENOXAPARIN SODIUM 30 MG: 100 INJECTION SUBCUTANEOUS at 10:26

## 2024-01-30 NOTE — PLAN OF CARE
Problem: Pain  Goal: Verbalizes/displays adequate comfort level or baseline comfort level  Outcome: Adequate for Discharge     Problem: Skin/Tissue Integrity  Goal: Absence of new skin breakdown  Description: 1.  Monitor for areas of redness and/or skin breakdown  2.  Assess vascular access sites hourly  3.  Every 4-6 hours minimum:  Change oxygen saturation probe site  4.  Every 4-6 hours:  If on nasal continuous positive airway pressure, respiratory therapy assess nares and determine need for appliance change or resting period.  Outcome: Adequate for Discharge     Problem: ABCDS Injury Assessment  Goal: Absence of physical injury  Outcome: Adequate for Discharge     Problem: Safety - Adult  Goal: Free from fall injury  Outcome: Adequate for Discharge     Problem: Risk for Elopement  Goal: Patient will not exit the unit/facility without proper excort  Outcome: Adequate for Discharge  Flowsheets (Taken 1/30/2024 1028)  Nursing Interventions for Elopement Risk:   Assist with personal care needs such as toileting, eating, dressing, as needed to reduce the risk of wandering   Collaborate with family members/caregivers to mitigate the elopement risk   Communicate/escalate to charge nurse the risk of elopement   Communicate/escalate to /other team member the risk of elopement   Reduce environmental triggers   Place patient in room far away from exits and stairways     Problem: Nutrition Deficit:  Goal: Optimize nutritional status  Outcome: Adequate for Discharge

## 2024-01-30 NOTE — CARE COORDINATION
CASE MANAGEMENT DISCHARGE SUMMARY      Discharge to: home at [facility] will resume [facility] therapy      IMM given: 1/30/24    New Durable Medical Equipment ordered/agency: none    Transportation: private   Family/car:   Medical Transport explained to pt/family. Pt/family voice no agency preference.    Agency used:   time:   Ambulance form completed: Yes    Confirmed discharge plan with:     Patient: yes/no     Family:  yes at bedside.      Facility/Agency, name:  [facility] faxed [phone]   Phone number for report to facility: [phone]     RN, name: Cole Peres RN

## 2024-01-30 NOTE — PROGRESS NOTES
Pt assessment completed and charted. VSS. Pt a/o to self. Bed in lowest position and wheels locked. Avasys in place. Call light within reach. Bedside table within reach. Non-skid footwear in place. Pt continues to get out of bed without using call light. PCA and I have tried to reorient pt several times so far. Ambulated pt in hallway.

## 2024-01-30 NOTE — PROGRESS NOTES
Discharge: Pt discharged to home as per order.  IV removed. Prescriptions & instructions reviewed.  Pt verbalized understanding.  Denied questions.  Taken out to private vehicle via w\c in stable & ambulatory condition.

## 2024-01-30 NOTE — DISCHARGE INSTR - COC
Continuity of Care Form    Patient Name: Susan Singh   :  1935  MRN:  [id number]    Admit date:  2024  Discharge date:  24    Code Status Order: Limited   Advance Directives:     Admitting Physician:  Jose Hercules MD  PCP: Osmar Brandon DO    Discharging Nurse: Cole Rosales RN  Discharging Hospital Unit/Room#: 0339/0339-01  Discharging Unit Phone Number: [phone]    Emergency Contact:   Extended Emergency Contact Information  Primary Emergency Contact: Arely Dickerson  Home Phone: [phone]  Work Phone: [phone]  Relation: Child  Secondary Emergency Contact: mary patrick  Mobile Phone: [phone]  Relation: Child    Past Surgical History:  Past Surgical History:   Procedure Laterality Date    COLONOSCOPY  2016    normal/ hemmoroids    COLONOSCOPY N/A 5/3/2021    COLONOSCOPY WITH BIOPSY performed by [facility]yes Slaughter MD at [facility] ENDOSCOPY    OTHER SURGICAL HISTORY      COLONOSCOPY WITH BIOPSY (N/A )    SHOULDER SURGERY         Immunization History:   Immunization History   Administered Date(s) Administered    COVID-19, MODERNA BLUE border, Primary or Immunocompromised, (age 12y+), IM, 100 mcg/0.5mL 2021, 2021, 2021    COVID-19, MODERNA Booster BLUE border, (age 18y+), IM, 50mcg/0.25mL 2022    COVID-19, PFIZER Bivalent, DO NOT Dilute, (age 12y+), IM, 30 mcg/0.3 mL 10/18/2022    COVID-19, PFIZER, ( formula), (age 12y+), IM, 30mcg/0.3mL 10/25/2023    Influenza Vaccine, unspecified formulation 10/05/2015, 2016    Influenza, AFLURIA (age 3 yrs+), FLUZONE, (age 6 mo+), MDV, 0.5mL 10/05/2017    Influenza, FLUAD, (age 65 y+), Adjuvanted, 0.5mL 10/01/2020, 2021, 10/18/2022    Influenza, High Dose (Fluzone 65 yrs and older) 10/11/2018    Influenza, Triv, inactivated, subunit, adjuvanted, IM (Fluad 65 yrs and older) 10/15/2019    Pneumococcal, PCV-13, PREVNAR 13, (age 6w+), IM, 0.5mL 2016    Pneumococcal, PPSV23,

## 2024-01-30 NOTE — DISCHARGE SUMMARY
V2.0  Discharge Summary    Name:  Susan Singh /Age/Sex: 1935 (88 y.o. female)   Admit Date: 2024  Discharge Date: 24    MRN & CSN:  [id number] & [id number] Encounter Date and Time 24 1:37 PM EST    Attending:  Pritesh Segura MD Discharging Provider: Tristian Murry MD PhD       Hospital Course:     Brief HPI    Susan Singh is an 88 y.o. female with a significant past medical history of hypertension, hyperlipidemia, CKD, and underlying dementia who presents to [facility]'s emergency department from a local F due to concerns from daughter that patient has been more somnolent and confused over baseline.  The onset was approximate 24 hours PTA, last known well 10 AM 2024. Patient was arousable in Emergency Department, slightly drowsy, minimally conversive.  Evaluation emergency room included laboratory studies, CT of the head, chest x-ray.  Radiology studies were negative for any acute findings.  Pertinent findings on laboratory studies include slightly elevated creatinine 1.5 which is slightly above baseline at 1.3, potassium 5.5, magnesium 2.5, normal LFT, normal WBC at 4.9, hemoglobin 11.0, and platelets 195.  Drug screen was negative.  Urinalysis showed clear urine without any nitrites or leukocyte esterase; microscopy was not performed due to normal urine.  Flu and COVID is negative.  Patient did not receive any treatment in the emergency department.  She was admitted for acute encephalopathy of unknown etiology.      Brief Problem Based Course    Acute Encephalopathy  - likely 2/2 existing dementia  - patient spontaneously returned to her baseline  - routine labs, UDS, infectious w/u negative  - CT head neg  - continue donepezil, melatonin nightly  - avoid anticholinergics     CKD  - stable at her baseline Cr of ~1.3  - continue home lisinopril     HTN  - continue home lisinopril      The patient expressed appropriate understanding of, and agreement with the

## 2024-01-30 NOTE — PROGRESS NOTES
BRODY Balbuena V: Pt has is continuously picking/scratching at her skin. Would you order benadryl PRN? Thanks.    Read 9:44 PM     Rx'd Atarax 10mg see MAR

## 2024-01-30 NOTE — PLAN OF CARE
Problem: Pain  Goal: Verbalizes/displays adequate comfort level or baseline comfort level  Recent Flowsheet Documentation  Taken 1/29/2024 2000 by Shira Lorenzo, RN  Verbalizes/displays adequate comfort level or baseline comfort level: Encourage patient to monitor pain and request assistance     Problem: Skin/Tissue Integrity  Goal: Absence of new skin breakdown  Description: 1.  Monitor for areas of redness and/or skin breakdown  2.  Assess vascular access sites hourly  3.  Every 4-6 hours minimum:  Change oxygen saturation probe site  4.  Every 4-6 hours:  If on nasal continuous positive airway pressure, respiratory therapy assess nares and determine need for appliance change or resting period.  Outcome: Progressing     Problem: ABCDS Injury Assessment  Goal: Absence of physical injury  Outcome: Progressing     Problem: Risk for Elopement  Goal: Patient will not exit the unit/facility without proper excort  Recent Flowsheet Documentation  Taken 1/29/2024 2000 by Shira Lorenzo, RN  Nursing Interventions for Elopement Risk:   Assist with personal care needs such as toileting, eating, dressing, as needed to reduce the risk of wandering   Collaborate with family members/caregivers to mitigate the elopement risk   Communicate/escalate to charge nurse the risk of elopement   Communicate/escalate to /other team member the risk of elopement   Reduce environmental triggers   Place patient in room far away from exits and stairways

## 2024-01-31 ENCOUNTER — TELEPHONE (OUTPATIENT)
Dept: FAMILY MEDICINE CLINIC | Age: 89
End: 2024-01-31

## 2024-01-31 NOTE — TELEPHONE ENCOUNTER
Care Transitions Initial Follow Up Call    Outreach made within 2 business days of discharge: Yes    Patient: Susan Singh Patient : 1935   MRN: [id number]  Reason for Admission: There are no discharge diagnoses documented for the most recent discharge.  Discharge Date: 24       Spoke with: Called SNF [facility] and spoke with the Nurse Bernarda patient admitted to SNF, fax sent for request of information.  I will schedule a hospital follow up once the patient is discharged from the SNF.    Discharge department/facility: [facility]      Scheduled appointment with PCP within 7-14 days    Follow Up  No future appointments.    Christine Souza LPN

## 2024-02-01 LAB — BACTERIA BLD CULT: NORMAL

## 2024-02-02 LAB — BACTERIA BLD CULT ORG #2: NORMAL

## 2024-04-16 ENCOUNTER — APPOINTMENT (OUTPATIENT)
Dept: CT IMAGING | Age: 89
End: 2024-04-16
Payer: MEDICARE

## 2024-04-16 ENCOUNTER — HOSPITAL ENCOUNTER (EMERGENCY)
Age: 89
Discharge: HOME | End: 2024-04-16
Payer: MEDICARE

## 2024-04-16 ENCOUNTER — APPOINTMENT (OUTPATIENT)
Dept: GENERAL RADIOLOGY | Age: 89
End: 2024-04-16
Payer: MEDICARE

## 2024-04-16 VITALS
HEIGHT: 60 IN | HEART RATE: 64 BPM | DIASTOLIC BLOOD PRESSURE: 50 MMHG | WEIGHT: 127 LBS | OXYGEN SATURATION: 99 % | BODY MASS INDEX: 24.94 KG/M2 | RESPIRATION RATE: 18 BRPM | SYSTOLIC BLOOD PRESSURE: 113 MMHG | TEMPERATURE: 98 F

## 2024-04-16 DIAGNOSIS — K64.8 INTERNAL HEMORRHOIDS: Primary | ICD-10-CM

## 2024-04-16 LAB
ALBUMIN SERPL-MCNC: 3.6 G/DL (ref 3.4–5)
ALBUMIN/GLOB SERPL: 1.2 {RATIO} (ref 1.1–2.2)
ALP SERPL-CCNC: 76 U/L (ref 40–129)
ALT SERPL-CCNC: 11 U/L (ref 10–40)
ANION GAP SERPL CALCULATED.3IONS-SCNC: 12 MMOL/L (ref 3–16)
AST SERPL-CCNC: 16 U/L (ref 15–37)
BASOPHILS # BLD: 0 K/UL (ref 0–0.2)
BASOPHILS NFR BLD: 0.7 %
BILIRUB SERPL-MCNC: <0.2 MG/DL (ref 0–1)
BUN SERPL-MCNC: 34 MG/DL (ref 7–20)
CALCIUM SERPL-MCNC: 9 MG/DL (ref 8.3–10.6)
CHLORIDE SERPL-SCNC: 102 MMOL/L (ref 99–110)
CO2 SERPL-SCNC: 23 MMOL/L (ref 21–32)
CREAT SERPL-MCNC: 1.6 MG/DL (ref 0.6–1.2)
DEPRECATED RDW RBC AUTO: 15.1 % (ref 12.4–15.4)
EOSINOPHIL # BLD: 0.3 K/UL (ref 0–0.6)
EOSINOPHIL NFR BLD: 5.1 %
GFR SERPLBLD CREATININE-BSD FMLA CKD-EPI: 31 ML/MIN/{1.73_M2}
GLUCOSE SERPL-MCNC: 95 MG/DL (ref 70–99)
HCT VFR BLD AUTO: 28.9 % (ref 36–48)
HEMOCCULT SP1 STL QL: NORMAL
HGB BLD-MCNC: 9.2 G/DL (ref 12–16)
LIPASE SERPL-CCNC: 37 U/L (ref 13–60)
LYMPHOCYTES # BLD: 1.5 K/UL (ref 1–5.1)
LYMPHOCYTES NFR BLD: 28.8 %
MCH RBC QN AUTO: 29.3 PG (ref 26–34)
MCHC RBC AUTO-ENTMCNC: 31.7 G/DL (ref 31–36)
MCV RBC AUTO: 92.5 FL (ref 80–100)
MONOCYTES # BLD: 0.5 K/UL (ref 0–1.3)
MONOCYTES NFR BLD: 9.6 %
NEUTROPHILS # BLD: 2.8 K/UL (ref 1.7–7.7)
NEUTROPHILS NFR BLD: 55.8 %
PLATELET # BLD AUTO: 139 K/UL (ref 135–450)
PMV BLD AUTO: 8.2 FL (ref 5–10.5)
POTASSIUM SERPL-SCNC: 4.9 MMOL/L (ref 3.5–5.1)
PROT SERPL-MCNC: 6.7 G/DL (ref 6.4–8.2)
RBC # BLD AUTO: 3.12 M/UL (ref 4–5.2)
SODIUM SERPL-SCNC: 137 MMOL/L (ref 136–145)
WBC # BLD AUTO: 5.1 K/UL (ref 4–11)

## 2024-04-16 PROCEDURE — 82270 OCCULT BLOOD FECES: CPT

## 2024-04-16 PROCEDURE — 83540 ASSAY OF IRON: CPT

## 2024-04-16 PROCEDURE — 82746 ASSAY OF FOLIC ACID SERUM: CPT

## 2024-04-16 PROCEDURE — 82607 VITAMIN B-12: CPT

## 2024-04-16 PROCEDURE — 83690 ASSAY OF LIPASE: CPT

## 2024-04-16 PROCEDURE — 99284 EMERGENCY DEPT VISIT MOD MDM: CPT

## 2024-04-16 PROCEDURE — 36415 COLL VENOUS BLD VENIPUNCTURE: CPT

## 2024-04-16 PROCEDURE — 85025 COMPLETE CBC W/AUTO DIFF WBC: CPT

## 2024-04-16 PROCEDURE — 80053 COMPREHEN METABOLIC PANEL: CPT

## 2024-04-16 PROCEDURE — 71045 X-RAY EXAM CHEST 1 VIEW: CPT

## 2024-04-16 PROCEDURE — 83550 IRON BINDING TEST: CPT

## 2024-04-16 PROCEDURE — 74176 CT ABD & PELVIS W/O CONTRAST: CPT

## 2024-04-16 ASSESSMENT — PAIN - FUNCTIONAL ASSESSMENT: PAIN_FUNCTIONAL_ASSESSMENT: 0-10

## 2024-04-16 ASSESSMENT — PAIN SCALES - GENERAL: PAINLEVEL_OUTOF10: 4

## 2024-04-16 ASSESSMENT — PAIN DESCRIPTION - PAIN TYPE: TYPE: ACUTE PAIN

## 2024-04-16 ASSESSMENT — PAIN DESCRIPTION - LOCATION: LOCATION: RECTUM

## 2024-04-16 ASSESSMENT — PAIN DESCRIPTION - DESCRIPTORS: DESCRIPTORS: ACHING

## 2024-04-16 NOTE — DISCHARGE INSTRUCTIONS
Hemoglobin was 9.2.  This is within her baseline.  Hemoccult negative for blood.  Exam reveals questionable internal hemorrhoid follow-up position.  Medication sent to [facility] pharmacy.  Kidney function at baseline with a BUN 34, creatinine 1.6 and GFR 31.  Recommend follow-up with healthcare provider.  Recommend follow with GI specialist Dr. Marks later this week.  We discussed the use of MiraLAX and Benefiber.

## 2024-04-16 NOTE — ED PROVIDER NOTES
Relation Age of Onset    Heart Disease Mother     Stroke Mother     High Blood Pressure Mother     Arthritis Mother     High Blood Pressure Brother         SOCIAL HISTORY       Social History     Tobacco Use    Smoking status: Never    Smokeless tobacco: Never   Substance Use Topics    Alcohol use: No    Drug use: No       SCREENINGS          Hope Coma Scale  Eye Opening: Spontaneous  Best Verbal Response: Confused  Best Motor Response: Obeys commands  Hope Coma Scale Score: 14                        CIWA Assessment  BP: (!) 113/50  Pulse: 64             PHYSICAL EXAM  1 or more Elements     ED Triage Vitals [04/16/24 1725]   BP Temp Temp Source Pulse Respirations SpO2 Height Weight - Scale   (!) 113/50 98 °F (36.7 °C) Oral 64 18 99 % 1.524 m (5') 57.6 kg (127 lb)       Physical Exam  Vitals and nursing note reviewed.   Constitutional:       Appearance: Normal appearance. She is well-developed and normal weight.   HENT:      Head: Normocephalic and atraumatic.      Right Ear: External ear normal.      Left Ear: External ear normal.   Eyes:      General: No scleral icterus.        Right eye: No discharge.         Left eye: No discharge.      Conjunctiva/sclera: Conjunctivae normal.      Comments: Pale conjunctiva noted bilaterally.   Cardiovascular:      Rate and Rhythm: Normal rate and regular rhythm.      Heart sounds: Normal heart sounds.   Pulmonary:      Effort: Pulmonary effort is normal.      Breath sounds: Normal breath sounds.   Abdominal:      General: Abdomen is flat. Bowel sounds are normal.      Palpations: Abdomen is soft.      Tenderness: There is abdominal tenderness.      Comments: My examination x 2 the patient does grimace with pressure applied in the left lower quadrant.   Genitourinary:     Comments: YEIMY performed with nurse in the room.  No external lesions.  Small hemorrhoid noted.  Upon entering the anal area she does have what appears to be tenderness and perhaps hemorrhoid at the 12

## 2024-04-17 LAB
FOLATE SERPL-MCNC: 17.14 NG/ML (ref 4.78–24.2)
IRON SATN MFR SERPL: 20 % (ref 15–50)
IRON SERPL-MCNC: 42 UG/DL (ref 37–145)
TIBC SERPL-MCNC: 207 UG/DL (ref 260–445)
VIT B12 SERPL-MCNC: 291 PG/ML (ref 211–911)

## 2024-04-18 ENCOUNTER — TELEPHONE (OUTPATIENT)
Dept: FAMILY MEDICINE CLINIC | Age: 89
End: 2024-04-18

## 2024-04-18 NOTE — TELEPHONE ENCOUNTER
Called the patient and the patient's daughter Arely to confirm patient care for the patient and to confirm that she is still living in assisted living.  No answer left a message for the patient and her daughter to return the call to the office.

## 2024-04-18 NOTE — TELEPHONE ENCOUNTER
Micki states that she will have to call her sister, Marry, to see if her mother is still under the care of Dr. Brandon.

## 2024-05-14 ENCOUNTER — HOSPITAL ENCOUNTER (INPATIENT)
Age: 89
LOS: 8 days | Discharge: NURSING, REHAB OR LONG-TERM CARE | DRG: 522 | End: 2024-05-22
Attending: EMERGENCY MEDICINE | Admitting: INTERNAL MEDICINE
Payer: MEDICARE

## 2024-05-14 ENCOUNTER — APPOINTMENT (OUTPATIENT)
Dept: GENERAL RADIOLOGY | Age: 89
DRG: 522 | End: 2024-05-14
Payer: MEDICARE

## 2024-05-14 ENCOUNTER — APPOINTMENT (OUTPATIENT)
Dept: CT IMAGING | Age: 89
DRG: 522 | End: 2024-05-14
Payer: MEDICARE

## 2024-05-14 ENCOUNTER — ANESTHESIA EVENT (OUTPATIENT)
Dept: OPERATING ROOM | Age: 89
End: 2024-05-14
Payer: MEDICARE
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
## 2024-05-14 DIAGNOSIS — S72.001A CLOSED DISPLACED FRACTURE OF RIGHT FEMORAL NECK (HCC): Primary | ICD-10-CM

## 2024-05-14 LAB
ABO + RH BLD: NORMAL
ALBUMIN SERPL-MCNC: 4.1 G/DL (ref 3.4–5)
ALBUMIN/GLOB SERPL: 1.4 {RATIO} (ref 1.1–2.2)
ALP SERPL-CCNC: 67 U/L (ref 40–129)
ALT SERPL-CCNC: 15 U/L (ref 10–40)
ANION GAP SERPL CALCULATED.3IONS-SCNC: 10 MMOL/L (ref 3–16)
APTT BLD: 26.3 SEC (ref 22.1–36.4)
AST SERPL-CCNC: 19 U/L (ref 15–37)
BASOPHILS # BLD: 0 K/UL (ref 0–0.2)
BASOPHILS NFR BLD: 0.4 %
BILIRUB SERPL-MCNC: <0.2 MG/DL (ref 0–1)
BILIRUB UR QL STRIP.AUTO: NEGATIVE
BLD GP AB SCN SERPL QL: NORMAL
BUN SERPL-MCNC: 32 MG/DL (ref 7–20)
CALCIUM SERPL-MCNC: 9.5 MG/DL (ref 8.3–10.6)
CHLORIDE SERPL-SCNC: 102 MMOL/L (ref 99–110)
CLARITY UR: CLEAR
CO2 SERPL-SCNC: 24 MMOL/L (ref 21–32)
COLOR UR: YELLOW
CREAT SERPL-MCNC: 1.2 MG/DL (ref 0.6–1.2)
DEPRECATED RDW RBC AUTO: 16.3 % (ref 12.4–15.4)
EOSINOPHIL # BLD: 0.2 K/UL (ref 0–0.6)
EOSINOPHIL NFR BLD: 3.1 %
GFR SERPLBLD CREATININE-BSD FMLA CKD-EPI: 44 ML/MIN/{1.73_M2}
GLUCOSE SERPL-MCNC: 100 MG/DL (ref 70–99)
GLUCOSE UR STRIP.AUTO-MCNC: NEGATIVE MG/DL
HCT VFR BLD AUTO: 30.2 % (ref 36–48)
HGB BLD-MCNC: 9.7 G/DL (ref 12–16)
HGB UR QL STRIP.AUTO: NEGATIVE
INR PPP: 1.02 (ref 0.85–1.15)
KETONES UR STRIP.AUTO-MCNC: NEGATIVE MG/DL
LEUKOCYTE ESTERASE UR QL STRIP.AUTO: NEGATIVE
LYMPHOCYTES # BLD: 1.2 K/UL (ref 1–5.1)
LYMPHOCYTES NFR BLD: 20.2 %
MCH RBC QN AUTO: 29.6 PG (ref 26–34)
MCHC RBC AUTO-ENTMCNC: 32.1 G/DL (ref 31–36)
MCV RBC AUTO: 92.2 FL (ref 80–100)
MONOCYTES # BLD: 0.4 K/UL (ref 0–1.3)
MONOCYTES NFR BLD: 7.7 %
NEUTROPHILS # BLD: 4 K/UL (ref 1.7–7.7)
NEUTROPHILS NFR BLD: 68.6 %
NITRITE UR QL STRIP.AUTO: NEGATIVE
NT-PROBNP SERPL-MCNC: 913 PG/ML (ref 0–449)
PH UR STRIP.AUTO: 6 [PH] (ref 5–8)
PLATELET # BLD AUTO: 203 K/UL (ref 135–450)
PMV BLD AUTO: 8.3 FL (ref 5–10.5)
POTASSIUM SERPL-SCNC: 4.7 MMOL/L (ref 3.5–5.1)
PROT SERPL-MCNC: 7.1 G/DL (ref 6.4–8.2)
PROT UR STRIP.AUTO-MCNC: NEGATIVE MG/DL
PROTHROMBIN TIME: 13.6 SEC (ref 11.9–14.9)
RBC # BLD AUTO: 3.28 M/UL (ref 4–5.2)
SODIUM SERPL-SCNC: 136 MMOL/L (ref 136–145)
SP GR UR STRIP.AUTO: 1.01 (ref 1–1.03)
TROPONIN, HIGH SENSITIVITY: 32 NG/L (ref 0–14)
UA COMPLETE W REFLEX CULTURE PNL UR: NORMAL
UA DIPSTICK W REFLEX MICRO PNL UR: NORMAL
URN SPEC COLLECT METH UR: NORMAL
UROBILINOGEN UR STRIP-ACNC: 0.2 E.U./DL
WBC # BLD AUTO: 5.8 K/UL (ref 4–11)

## 2024-05-14 PROCEDURE — 6370000000 HC RX 637 (ALT 250 FOR IP): Performed by: INTERNAL MEDICINE

## 2024-05-14 PROCEDURE — 86900 BLOOD TYPING SEROLOGIC ABO: CPT

## 2024-05-14 PROCEDURE — 96375 TX/PRO/DX INJ NEW DRUG ADDON: CPT

## 2024-05-14 PROCEDURE — 86850 RBC ANTIBODY SCREEN: CPT

## 2024-05-14 PROCEDURE — 99223 1ST HOSP IP/OBS HIGH 75: CPT | Performed by: ORTHOPAEDIC SURGERY

## 2024-05-14 PROCEDURE — 36415 COLL VENOUS BLD VENIPUNCTURE: CPT

## 2024-05-14 PROCEDURE — 86901 BLOOD TYPING SEROLOGIC RH(D): CPT

## 2024-05-14 PROCEDURE — 51702 INSERT TEMP BLADDER CATH: CPT

## 2024-05-14 PROCEDURE — 70450 CT HEAD/BRAIN W/O DYE: CPT

## 2024-05-14 PROCEDURE — 2700000000 HC OXYGEN THERAPY PER DAY

## 2024-05-14 PROCEDURE — 84484 ASSAY OF TROPONIN QUANT: CPT

## 2024-05-14 PROCEDURE — 99285 EMERGENCY DEPT VISIT HI MDM: CPT

## 2024-05-14 PROCEDURE — 80053 COMPREHEN METABOLIC PANEL: CPT

## 2024-05-14 PROCEDURE — 1200000000 HC SEMI PRIVATE

## 2024-05-14 PROCEDURE — 85610 PROTHROMBIN TIME: CPT

## 2024-05-14 PROCEDURE — 94761 N-INVAS EAR/PLS OXIMETRY MLT: CPT

## 2024-05-14 PROCEDURE — 6360000002 HC RX W HCPCS: Performed by: INTERNAL MEDICINE

## 2024-05-14 PROCEDURE — 71045 X-RAY EXAM CHEST 1 VIEW: CPT

## 2024-05-14 PROCEDURE — 72125 CT NECK SPINE W/O DYE: CPT

## 2024-05-14 PROCEDURE — 2580000003 HC RX 258: Performed by: INTERNAL MEDICINE

## 2024-05-14 PROCEDURE — 93005 ELECTROCARDIOGRAM TRACING: CPT | Performed by: PHYSICIAN ASSISTANT

## 2024-05-14 PROCEDURE — 96374 THER/PROPH/DIAG INJ IV PUSH: CPT

## 2024-05-14 PROCEDURE — 81003 URINALYSIS AUTO W/O SCOPE: CPT

## 2024-05-14 PROCEDURE — 85025 COMPLETE CBC W/AUTO DIFF WBC: CPT

## 2024-05-14 PROCEDURE — 83880 ASSAY OF NATRIURETIC PEPTIDE: CPT

## 2024-05-14 PROCEDURE — 6360000002 HC RX W HCPCS: Performed by: PHYSICIAN ASSISTANT

## 2024-05-14 PROCEDURE — 73502 X-RAY EXAM HIP UNI 2-3 VIEWS: CPT

## 2024-05-14 PROCEDURE — 85730 THROMBOPLASTIN TIME PARTIAL: CPT

## 2024-05-14 RX ORDER — LANOLIN ALCOHOL/MO/W.PET/CERES
400 CREAM (GRAM) TOPICAL NIGHTLY
Status: DISCONTINUED | OUTPATIENT
Start: 2024-05-14 | End: 2024-05-16

## 2024-05-14 RX ORDER — LISINOPRIL 10 MG/1
10 TABLET ORAL DAILY
Status: DISCONTINUED | OUTPATIENT
Start: 2024-05-15 | End: 2024-05-21

## 2024-05-14 RX ORDER — ONDANSETRON 2 MG/ML
4 INJECTION INTRAMUSCULAR; INTRAVENOUS ONCE
Status: COMPLETED | OUTPATIENT
Start: 2024-05-14 | End: 2024-05-14

## 2024-05-14 RX ORDER — MORPHINE SULFATE 2 MG/ML
2 INJECTION, SOLUTION INTRAMUSCULAR; INTRAVENOUS EVERY 4 HOURS PRN
Status: DISCONTINUED | OUTPATIENT
Start: 2024-05-14 | End: 2024-05-15

## 2024-05-14 RX ORDER — MIRTAZAPINE 15 MG/1
15 TABLET, FILM COATED ORAL NIGHTLY
Status: DISCONTINUED | OUTPATIENT
Start: 2024-05-14 | End: 2024-05-16

## 2024-05-14 RX ORDER — MORPHINE SULFATE 4 MG/ML
4 INJECTION, SOLUTION INTRAMUSCULAR; INTRAVENOUS EVERY 4 HOURS PRN
Status: DISCONTINUED | OUTPATIENT
Start: 2024-05-14 | End: 2024-05-15

## 2024-05-14 RX ORDER — DIPHENHYDRAMINE HYDROCHLORIDE 50 MG/ML
12.5 INJECTION INTRAMUSCULAR; INTRAVENOUS ONCE
Status: COMPLETED | OUTPATIENT
Start: 2024-05-14 | End: 2024-05-14

## 2024-05-14 RX ORDER — ONDANSETRON 4 MG/1
4 TABLET, ORALLY DISINTEGRATING ORAL EVERY 8 HOURS PRN
Status: DISCONTINUED | OUTPATIENT
Start: 2024-05-14 | End: 2024-05-22 | Stop reason: HOSPADM

## 2024-05-14 RX ORDER — SODIUM CHLORIDE 0.9 % (FLUSH) 0.9 %
5-40 SYRINGE (ML) INJECTION EVERY 12 HOURS SCHEDULED
Status: DISCONTINUED | OUTPATIENT
Start: 2024-05-14 | End: 2024-05-16 | Stop reason: SDUPTHER

## 2024-05-14 RX ORDER — ROPINIROLE 0.5 MG/1
0.25 TABLET, FILM COATED ORAL NIGHTLY
Status: DISCONTINUED | OUTPATIENT
Start: 2024-05-14 | End: 2024-05-16

## 2024-05-14 RX ORDER — OXYCODONE HYDROCHLORIDE 5 MG/1
5 TABLET ORAL EVERY 4 HOURS PRN
Status: DISCONTINUED | OUTPATIENT
Start: 2024-05-14 | End: 2024-05-22 | Stop reason: HOSPADM

## 2024-05-14 RX ORDER — OXYCODONE HYDROCHLORIDE 5 MG/1
10 TABLET ORAL EVERY 4 HOURS PRN
Status: DISCONTINUED | OUTPATIENT
Start: 2024-05-14 | End: 2024-05-22 | Stop reason: HOSPADM

## 2024-05-14 RX ORDER — MECOBALAMIN 5000 MCG
5 TABLET,DISINTEGRATING ORAL NIGHTLY
Status: DISCONTINUED | OUTPATIENT
Start: 2024-05-14 | End: 2024-05-22 | Stop reason: HOSPADM

## 2024-05-14 RX ORDER — POLYETHYLENE GLYCOL 3350 17 G/17G
17 POWDER, FOR SOLUTION ORAL DAILY PRN
Status: DISCONTINUED | OUTPATIENT
Start: 2024-05-14 | End: 2024-05-16

## 2024-05-14 RX ORDER — ACETAMINOPHEN 650 MG/1
650 SUPPOSITORY RECTAL EVERY 6 HOURS PRN
Status: DISCONTINUED | OUTPATIENT
Start: 2024-05-14 | End: 2024-05-22 | Stop reason: HOSPADM

## 2024-05-14 RX ORDER — ONDANSETRON 2 MG/ML
4 INJECTION INTRAMUSCULAR; INTRAVENOUS EVERY 6 HOURS PRN
Status: DISCONTINUED | OUTPATIENT
Start: 2024-05-14 | End: 2024-05-22 | Stop reason: HOSPADM

## 2024-05-14 RX ORDER — SODIUM CHLORIDE 0.9 % (FLUSH) 0.9 %
5-40 SYRINGE (ML) INJECTION PRN
Status: DISCONTINUED | OUTPATIENT
Start: 2024-05-14 | End: 2024-05-16 | Stop reason: SDUPTHER

## 2024-05-14 RX ORDER — MORPHINE SULFATE 4 MG/ML
4 INJECTION, SOLUTION INTRAMUSCULAR; INTRAVENOUS ONCE
Status: COMPLETED | OUTPATIENT
Start: 2024-05-14 | End: 2024-05-14

## 2024-05-14 RX ORDER — DONEPEZIL HYDROCHLORIDE 5 MG/1
10 TABLET, FILM COATED ORAL NIGHTLY
Status: DISCONTINUED | OUTPATIENT
Start: 2024-05-14 | End: 2024-05-22 | Stop reason: HOSPADM

## 2024-05-14 RX ORDER — ALPRAZOLAM 0.25 MG/1
0.25 TABLET ORAL 3 TIMES DAILY PRN
Status: DISCONTINUED | OUTPATIENT
Start: 2024-05-14 | End: 2024-05-22 | Stop reason: HOSPADM

## 2024-05-14 RX ORDER — ACETAMINOPHEN 325 MG/1
650 TABLET ORAL EVERY 6 HOURS PRN
Status: DISCONTINUED | OUTPATIENT
Start: 2024-05-14 | End: 2024-05-16

## 2024-05-14 RX ORDER — SODIUM CHLORIDE 9 MG/ML
INJECTION, SOLUTION INTRAVENOUS PRN
Status: DISCONTINUED | OUTPATIENT
Start: 2024-05-14 | End: 2024-05-22 | Stop reason: HOSPADM

## 2024-05-14 RX ADMIN — MORPHINE SULFATE 4 MG: 4 INJECTION, SOLUTION INTRAMUSCULAR; INTRAVENOUS at 21:17

## 2024-05-14 RX ADMIN — DONEPEZIL HYDROCHLORIDE 10 MG: 5 TABLET, FILM COATED ORAL at 22:37

## 2024-05-14 RX ADMIN — OXYCODONE HYDROCHLORIDE 5 MG: 5 TABLET ORAL at 22:37

## 2024-05-14 RX ADMIN — ROPINIROLE HYDROCHLORIDE 0.25 MG: 0.5 TABLET, FILM COATED ORAL at 22:38

## 2024-05-14 RX ADMIN — Medication 10 ML: at 22:38

## 2024-05-14 RX ADMIN — SERTRALINE HYDROCHLORIDE 100 MG: 50 TABLET ORAL at 22:37

## 2024-05-14 RX ADMIN — ONDANSETRON 4 MG: 2 INJECTION INTRAMUSCULAR; INTRAVENOUS at 21:18

## 2024-05-14 RX ADMIN — HYDROMORPHONE HYDROCHLORIDE 0.5 MG: 1 INJECTION, SOLUTION INTRAMUSCULAR; INTRAVENOUS; SUBCUTANEOUS at 17:46

## 2024-05-14 RX ADMIN — Medication 5 MG: at 22:38

## 2024-05-14 RX ADMIN — DIPHENHYDRAMINE HYDROCHLORIDE 12.5 MG: 50 INJECTION INTRAMUSCULAR; INTRAVENOUS at 16:50

## 2024-05-14 RX ADMIN — ONDANSETRON 4 MG: 2 INJECTION INTRAMUSCULAR; INTRAVENOUS at 16:50

## 2024-05-14 RX ADMIN — MORPHINE SULFATE 4 MG: 4 INJECTION, SOLUTION INTRAMUSCULAR; INTRAVENOUS at 16:50

## 2024-05-14 ASSESSMENT — PAIN - FUNCTIONAL ASSESSMENT
PAIN_FUNCTIONAL_ASSESSMENT: INTOLERABLE, UNABLE TO DO ANY ACTIVE OR PASSIVE ACTIVITIES
PAIN_FUNCTIONAL_ASSESSMENT: 0-10

## 2024-05-14 ASSESSMENT — PAIN SCALES - GENERAL
PAINLEVEL_OUTOF10: 6
PAINLEVEL_OUTOF10: 8
PAINLEVEL_OUTOF10: 4

## 2024-05-14 ASSESSMENT — PAIN DESCRIPTION - ORIENTATION: ORIENTATION: RIGHT

## 2024-05-14 ASSESSMENT — PAIN DESCRIPTION - LOCATION: LOCATION: HIP

## 2024-05-14 ASSESSMENT — PAIN DESCRIPTION - DESCRIPTORS: DESCRIPTORS: SHARP

## 2024-05-14 ASSESSMENT — PAIN DESCRIPTION - DIRECTION: RADIATING_TOWARDS: LEG

## 2024-05-14 ASSESSMENT — PAIN DESCRIPTION - FREQUENCY: FREQUENCY: CONTINUOUS

## 2024-05-14 ASSESSMENT — PAIN DESCRIPTION - PAIN TYPE: TYPE: ACUTE PAIN

## 2024-05-14 ASSESSMENT — PAIN DESCRIPTION - ONSET: ONSET: ON-GOING

## 2024-05-14 NOTE — ED NOTES
Pt resting comfortably in bed after receiving pain medication. Pt no longer grimacing in discomfort

## 2024-05-14 NOTE — CONSULTS
[facility] Orthopedic Surgery  Consult Note         This patient is seen in consultation at the request of Romina Gagnon PA-C     Reason for Consult:  Right hip fracture.    CHIEF COMPLAINT:  Right hip pain/ fall    History Obtained From:  patient, family member - daughter, electronic medical record    HISTORY OF PRESENT ILLNESS:    Ms. Singh 89 y.o.  female with dementia seen today for consultation and evaluation of a right hip pain which occurred when she fell at home.   She is complaining of right hip pain.  This is better with rest and worse with bearing any wt.  The pain is sharp and not radiating. No numbness or tingling sensation.  No other complaint. She was seen 1st at [facility], came via EMS, where she was x-rayed and I was consulted.     Past Medical History:        Diagnosis Date    Arthritis     Chicken pox     Chills     CKD (chronic kidney disease) stage 3, GFR 30-59 ml/min ([facility]) 1/10/2019    Current moderate episode of major depressive disorder without prior episode ([facility]) 4/28/2022    Fever     Hip pain     Hyperlipidemia     Hypertension     Measles     Miscarriage     Osteoarthritis     Pneumonia     Poor circulation     Ringing in ears     Sacroiliitis ([facility]) 4/15/2016    Sensorineural hearing loss, bilateral 10/20/2020    Swelling of both ankles     Tonsillitis        Past Surgical History:        Procedure Laterality Date    COLONOSCOPY  11/09/2016    normal/ hemmoroids    COLONOSCOPY N/A 5/3/2021    COLONOSCOPY WITH BIOPSY performed by Reyes Slaughter MD at [facility] ENDOSCOPY    OTHER SURGICAL HISTORY      COLONOSCOPY WITH BIOPSY (N/A )    SHOULDER SURGERY         Medications prior to admission:   Prior to Admission medications    Medication Sig Start Date End Date Taking? Authorizing Provider   hydrocortisone 2.5 % cream Apply topically 2 times daily. 4/16/24  Yes Marcos Rubalcava PA-C   ALPRAZolam (XANAX) 0.25 MG tablet Take 1 tablet by mouth 3 times daily as needed for Sleep.

## 2024-05-14 NOTE — ED PROVIDER NOTES
[place] [id number] - MED SURG/ORTHO  EMERGENCY DEPARTMENT ENCOUNTER        Pt Name: Susan Singh  MRN: [id number]  Birthdate 1935  Date of evaluation: 5/14/2024  Provider: Romina Gagnon PA-C  PCP: No primary care provider on file.  Note Started: 4:29 PM EDT 5/14/24       I have seen and evaluated this patient with my supervising physician Jamel Jacobs MD.      CHIEF COMPLAINT       Chief Complaint   Patient presents with    Fall     Right hip pain, t-pod placed, pt was confused at scene, does not remember falling, lives at home alone       HISTORY OF PRESENT ILLNESS: 1 or more Elements     History from EMS and patients daughter     Limitations to history : Altered Mental Status    Susan Singh is a 89 y.o. female with a pMh of CKD, hypertension, hyperlipidemia, colitis, prediabetes, dementia patient brought in by EMS for evaluation of hip pain, witnessed fall at her home as she was attempting to go up the steps of her house.  She fell into the mulch on her right side but no pain medicine administered prior to coming into the ER.  Initially thought the patient was altered however when patient's daughter arrived at bedside she advised that mother is at baseline mental status.    Patient comes from home.     Nursing Notes were all reviewed and agreed with or any disagreements were addressed in the HPI.    REVIEW OF SYSTEMS :      Review of Systems   Unable to perform ROS: Dementia       Positives and Pertinent negatives as per HPI.     SURGICAL HISTORY     Past Surgical History:   Procedure Laterality Date    COLONOSCOPY  11/09/2016    normal/ hemmoroids    COLONOSCOPY N/A 5/3/2021    COLON[facility] WITH [facility]SY [facility]    OTHER SURGICAL HISTORY      COLONOSCOPY WITH BIOPSY (N/A )    SHOULDER SURGERY         CURRENTMEDICATIONS       Current Discharge Medication List        CONTINUE these medications which have NOT CHANGED    Details   hydrocortisone 2.5 %  the hospitalist who accepted the patient in stable condition.  I am the Primary Clinician of Record.    FINAL IMPRESSION      1. Closed displaced fracture of right femoral neck (HCC)    2. Fall, initial encounter          DISPOSITION/PLAN     DISPOSITION Admitted 05/14/2024 06:58:24 PM      PATIENT REFERRED TO:  No follow-up provider specified.    DISCHARGE MEDICATIONS:  Current Discharge Medication List          DISCONTINUED MEDICATIONS:  Current Discharge Medication List                 (Please note that portions of this note were completed with a voice recognition program.  Efforts were made to edit the dictations but occasionally words are mis-transcribed.)    Romina Gagnon PA-C (electronically signed)            Romina Gagnon PA-C  05/14/24 [id number]       Romina Gagnon PA-C  05/14/24 [id number]     Rituxan Pregnancy And Lactation Text: This medication is Pregnancy Category C and it isn't know if it is safe during pregnancy. It is unknown if this medication is excreted in breast milk but similar antibodies are known to be excreted.

## 2024-05-14 NOTE — H&P
Hospital Medicine History & Physical      Date of Admission: 5/14/2024    Date of Service:  Pt seen/examined on 14 May 2024     [x]Admitted to Inpatient with expected LOS greater than two midnights due to medical therapy.  []Placed in Observation status.    Chief Admission Complaint:  R hip pain      Presenting Admission History:     89 y.o. female who lives independently at home w/ hx HTN/CKD and mild dementia who presented to [facility] with acute severe R hip pain after witnessed mechanical fall w/out head trauma or LOC.  Pain is worse w/ movement and is unable to bear weight.     She denies any CP or SOB suggestive of active ischemia/failure.       The patient denies any fever/chills or other signs/sxs of systemic illness or identifiable aggravating/alleviating factors\"      Assessment/Plan:      Current Principal Problem:  Hip fracture requiring operative repair, right, closed, initial encounter ([facility])      R Hip Fracture - Acute fx 2nd to mechanical fall.  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  Ortho consulted and appreciated in advance.  OK to OR w/out further Cardiopulmonary evaluation w/out signs/sxs of active ischemia/failure.  Continue post-op mgt/rehab plan per ortho.  PO/IV Narcotic analgesia as ordered.      Anemia - etiology clinically unable to determine, w/out evidence of active bleeding/hemolysis. Asymptomatic w/out indication for transfusion. Follow serial labs - documented and reviewed.     CKD - baseline stage 2.  Follow serial labs.  Reviewed and documented.    HTN - w/out known CAD and no evidence of active signs/sxs of ischemia/failure. Currently controlled on home meds w/ vitals documented and reviewed.    Dementia - w/out behavioral disturbance.  Controlled on home medication regimen - continued.  Continue supportive care and redirection  4.7      CO2 24   BUN 32*   CREATININE 1.2   CALCIUM 9.5     No results for input(s): \"PROBNP\", \"TROPHS\" in the last 72 hours.  No results for input(s): \"LABA1C\" in the last 72 hours.  Recent Labs     05/14/24  1628   AST 19   ALT 15   BILITOT <0.2   ALKPHOS 67     No results for input(s): \"INR\", \"LACTA\", \"TSH\" in the last 72 hours.     Trevon Sahu MD

## 2024-05-14 NOTE — ED PROVIDER NOTES
THIS IS MY JENNIFER SUPERVISORY AND SHARED VISIT NOTE:    I personally saw the patient and made/approved the management plan and take responsibility for the patient management.    I independently performed a history and physical on Susan Singh.   All diagnostic, treatment, and disposition decisions were made by myself in conjunction with the advanced practice provider. I personally saw the patient and performed a substantive portion of the visit including all aspects of the medical decision making.      For further details of Susan Singh's emergency department encounter, please see DESI Orr's documentation.      History: Patient is an 89-year-old female presenting today due to concern for falling and ultimately having right hip pain.  She has a history of dementia at baseline and history is limited.  There was concern for confusion on arrival although by the time I saw her, a family member was at the bedside and states she has had her baseline mentation.  History was limited although she did appear to be in pain related to her hip.    Physical exam:   Gen: Mild acute distress.  Alert.  Psych: Anxious mood  HEENT: NCAT, PERRL, MMM  Neck: supple, nontender to palpation  Cardiac: RRR, pulses 2+ in all 4 extremities including bilateral radial and dorsalis pedis pulses  Lungs: C2AB, no R/R/W  Abdomen: soft and nontender with no R/D/G  Neuro: no focal neuro deficits with strength 5/5 in all 4 extremities involving  strength bilaterally and movement of the left foot although limited with the right foot since does complain of hip pain  MSK: Normal range of motion of bilateral shoulders/elbows/wrists along with left hip/knee/ankle nontender to palpation to these joints, tenderness to palpation the right hip, no tenderness to the right knee or right ankle although no range of motion of these       The Ekg interpreted by me shows  sinus bradycardia, rate=57    Axis is   Normal  QTc is  normal  Intervals and  Durations are unremarkable.      ST Segments: no acute change and normal  No significant change from prior EKG dated - 1/24/23  No STEMI         I independently interpreted the following study(s) labs which show creatinine was 1.2 which is her baseline related to chronic kidney disease.  The patient was anemic which is her baseline.  White blood cell count was normal range.  Urinalysis was negative for infection.  Troponin was slightly elevated although no reported chest pain and this can be trended in the hospital.              I was the primary provider for the patient along with physician assistant Chelsi.          MDM: Patient was evaluated due to concern for fall and complaining of right hip pain.  She has baseline confusion with dementia and no reported change per family member at the bedside.  CT of the head was negative for intracranial hemorrhage per radiologist and CT of the cervical spine was negative for any fracture.  Right hip x-ray confirmed fracture needing orthopedic evaluation.  She was stable for the floor with telemetry.  If there is any concern for any additional change in mentation, neurology consider in the hospital.  She had good strength in upper extremities and at this time I do feel that TIA/stroke is less likely, but again lower extremity exam limited due to pain but she could wiggle her right toes/foot to command.               Jamel Jacobs MD  05/15/24 1300

## 2024-05-14 NOTE — ED NOTES
Pts oxygen dropped down the 80s after pain medication administered. Pt placed on 2L NC current oxygen sats 100%

## 2024-05-15 ENCOUNTER — APPOINTMENT (OUTPATIENT)
Dept: GENERAL RADIOLOGY | Age: 89
DRG: 522 | End: 2024-05-15
Payer: MEDICARE

## 2024-05-15 ENCOUNTER — ANESTHESIA (OUTPATIENT)
Dept: OPERATING ROOM | Age: 89
End: 2024-05-15
Payer: MEDICARE

## 2024-05-15 LAB
ALBUMIN SERPL-MCNC: 3.5 G/DL (ref 3.4–5)
ANION GAP SERPL CALCULATED.3IONS-SCNC: 11 MMOL/L (ref 3–16)
BUN SERPL-MCNC: 29 MG/DL (ref 7–20)
CALCIUM SERPL-MCNC: 8.6 MG/DL (ref 8.3–10.6)
CHLORIDE SERPL-SCNC: 103 MMOL/L (ref 99–110)
CO2 SERPL-SCNC: 24 MMOL/L (ref 21–32)
CREAT SERPL-MCNC: 1.1 MG/DL (ref 0.6–1.2)
DEPRECATED RDW RBC AUTO: 15.8 % (ref 12.4–15.4)
EKG ATRIAL RATE: 57 BPM
EKG DIAGNOSIS: NORMAL
EKG P AXIS: 75 DEGREES
EKG P-R INTERVAL: 188 MS
EKG Q-T INTERVAL: 422 MS
EKG QRS DURATION: 78 MS
EKG QTC CALCULATION (BAZETT): 410 MS
EKG R AXIS: 27 DEGREES
EKG T AXIS: 61 DEGREES
EKG VENTRICULAR RATE: 57 BPM
GFR SERPLBLD CREATININE-BSD FMLA CKD-EPI: 49 ML/MIN/{1.73_M2}
GLUCOSE SERPL-MCNC: 125 MG/DL (ref 70–99)
HCT VFR BLD AUTO: 28.5 % (ref 36–48)
HGB BLD-MCNC: 9.3 G/DL (ref 12–16)
MAGNESIUM SERPL-MCNC: 2.1 MG/DL (ref 1.8–2.4)
MCH RBC QN AUTO: 29.9 PG (ref 26–34)
MCHC RBC AUTO-ENTMCNC: 32.4 G/DL (ref 31–36)
MCV RBC AUTO: 92.1 FL (ref 80–100)
PHOSPHATE SERPL-MCNC: 4.1 MG/DL (ref 2.5–4.9)
PLATELET # BLD AUTO: 166 K/UL (ref 135–450)
PMV BLD AUTO: 7.9 FL (ref 5–10.5)
POTASSIUM SERPL-SCNC: 5 MMOL/L (ref 3.5–5.1)
RBC # BLD AUTO: 3.1 M/UL (ref 4–5.2)
SODIUM SERPL-SCNC: 138 MMOL/L (ref 136–145)
WBC # BLD AUTO: 6.1 K/UL (ref 4–11)

## 2024-05-15 PROCEDURE — 7100000000 HC PACU RECOVERY - FIRST 15 MIN: Performed by: ORTHOPAEDIC SURGERY

## 2024-05-15 PROCEDURE — 27236 TREAT THIGH FRACTURE: CPT | Performed by: ORTHOPAEDIC SURGERY

## 2024-05-15 PROCEDURE — 94761 N-INVAS EAR/PLS OXIMETRY MLT: CPT

## 2024-05-15 PROCEDURE — 3600000015 HC SURGERY LEVEL 5 ADDTL 15MIN: Performed by: ORTHOPAEDIC SURGERY

## 2024-05-15 PROCEDURE — 0SRR0JA REPLACEMENT OF RIGHT HIP JOINT, FEMORAL SURFACE WITH SYNTHETIC SUBSTITUTE, UNCEMENTED, OPEN APPROACH: ICD-10-PCS | Performed by: ORTHOPAEDIC SURGERY

## 2024-05-15 PROCEDURE — 6370000000 HC RX 637 (ALT 250 FOR IP): Performed by: INTERNAL MEDICINE

## 2024-05-15 PROCEDURE — 6370000000 HC RX 637 (ALT 250 FOR IP): Performed by: ORTHOPAEDIC SURGERY

## 2024-05-15 PROCEDURE — 6370000000 HC RX 637 (ALT 250 FOR IP): Performed by: ANESTHESIOLOGY

## 2024-05-15 PROCEDURE — 2500000003 HC RX 250 WO HCPCS: Performed by: NURSE ANESTHETIST, CERTIFIED REGISTERED

## 2024-05-15 PROCEDURE — 3700000001 HC ADD 15 MINUTES (ANESTHESIA): Performed by: ORTHOPAEDIC SURGERY

## 2024-05-15 PROCEDURE — 2580000003 HC RX 258: Performed by: ORTHOPAEDIC SURGERY

## 2024-05-15 PROCEDURE — 85027 COMPLETE CBC AUTOMATED: CPT

## 2024-05-15 PROCEDURE — 7100000001 HC PACU RECOVERY - ADDTL 15 MIN: Performed by: ORTHOPAEDIC SURGERY

## 2024-05-15 PROCEDURE — C1776 JOINT DEVICE (IMPLANTABLE): HCPCS | Performed by: ORTHOPAEDIC SURGERY

## 2024-05-15 PROCEDURE — 1200000000 HC SEMI PRIVATE

## 2024-05-15 PROCEDURE — 83735 ASSAY OF MAGNESIUM: CPT

## 2024-05-15 PROCEDURE — 6360000002 HC RX W HCPCS: Performed by: INTERNAL MEDICINE

## 2024-05-15 PROCEDURE — 2580000003 HC RX 258: Performed by: NURSE ANESTHETIST, CERTIFIED REGISTERED

## 2024-05-15 PROCEDURE — 6360000002 HC RX W HCPCS: Performed by: ORTHOPAEDIC SURGERY

## 2024-05-15 PROCEDURE — 6360000002 HC RX W HCPCS: Performed by: NURSE ANESTHETIST, CERTIFIED REGISTERED

## 2024-05-15 PROCEDURE — 73502 X-RAY EXAM HIP UNI 2-3 VIEWS: CPT

## 2024-05-15 PROCEDURE — 3700000000 HC ANESTHESIA ATTENDED CARE: Performed by: ORTHOPAEDIC SURGERY

## 2024-05-15 PROCEDURE — 2709999900 HC NON-CHARGEABLE SUPPLY: Performed by: ORTHOPAEDIC SURGERY

## 2024-05-15 PROCEDURE — 36415 COLL VENOUS BLD VENIPUNCTURE: CPT

## 2024-05-15 PROCEDURE — 2700000000 HC OXYGEN THERAPY PER DAY

## 2024-05-15 PROCEDURE — 80069 RENAL FUNCTION PANEL: CPT

## 2024-05-15 PROCEDURE — 6360000002 HC RX W HCPCS: Performed by: SPECIALIST/TECHNOLOGIST

## 2024-05-15 PROCEDURE — 93010 ELECTROCARDIOGRAM REPORT: CPT | Performed by: INTERNAL MEDICINE

## 2024-05-15 PROCEDURE — 3600000005 HC SURGERY LEVEL 5 BASE: Performed by: ORTHOPAEDIC SURGERY

## 2024-05-15 PROCEDURE — 51702 INSERT TEMP BLADDER CATH: CPT

## 2024-05-15 DEVICE — LFIT V40 FEMORAL HEAD
Type: IMPLANTABLE DEVICE | Site: HIP | Status: FUNCTIONAL
Brand: V40 HEAD

## 2024-05-15 DEVICE — 127 DEGREE NECK ANGLE HIP STEM
Type: IMPLANTABLE DEVICE | Site: HIP | Status: FUNCTIONAL
Brand: ACCOLADE

## 2024-05-15 DEVICE — BIPOLAR COMPONENT
Type: IMPLANTABLE DEVICE | Site: HIP | Status: FUNCTIONAL
Brand: UHR

## 2024-05-15 RX ORDER — SODIUM CHLORIDE 450 MG/100ML
INJECTION, SOLUTION INTRAVENOUS CONTINUOUS
Status: DISCONTINUED | OUTPATIENT
Start: 2024-05-15 | End: 2024-05-18

## 2024-05-15 RX ORDER — SODIUM CHLORIDE 0.9 % (FLUSH) 0.9 %
5-40 SYRINGE (ML) INJECTION EVERY 12 HOURS SCHEDULED
Status: DISCONTINUED | OUTPATIENT
Start: 2024-05-15 | End: 2024-05-22 | Stop reason: HOSPADM

## 2024-05-15 RX ORDER — EPHEDRINE SULFATE 50 MG/ML
INJECTION INTRAVENOUS PRN
Status: DISCONTINUED | OUTPATIENT
Start: 2024-05-15 | End: 2024-05-15 | Stop reason: SDUPTHER

## 2024-05-15 RX ORDER — SODIUM CHLORIDE 9 MG/ML
INJECTION, SOLUTION INTRAVENOUS PRN
Status: DISCONTINUED | OUTPATIENT
Start: 2024-05-15 | End: 2024-05-15 | Stop reason: HOSPADM

## 2024-05-15 RX ORDER — ONDANSETRON 2 MG/ML
4 INJECTION INTRAMUSCULAR; INTRAVENOUS EVERY 30 MIN PRN
Status: DISCONTINUED | OUTPATIENT
Start: 2024-05-15 | End: 2024-05-15 | Stop reason: HOSPADM

## 2024-05-15 RX ORDER — NALOXONE HYDROCHLORIDE 0.4 MG/ML
INJECTION, SOLUTION INTRAMUSCULAR; INTRAVENOUS; SUBCUTANEOUS PRN
Status: DISCONTINUED | OUTPATIENT
Start: 2024-05-15 | End: 2024-05-15 | Stop reason: HOSPADM

## 2024-05-15 RX ORDER — PROPOFOL 10 MG/ML
INJECTION, EMULSION INTRAVENOUS PRN
Status: DISCONTINUED | OUTPATIENT
Start: 2024-05-15 | End: 2024-05-15 | Stop reason: SDUPTHER

## 2024-05-15 RX ORDER — HYDROMORPHONE HYDROCHLORIDE 1 MG/ML
0.25 INJECTION, SOLUTION INTRAMUSCULAR; INTRAVENOUS; SUBCUTANEOUS
Status: DISCONTINUED | OUTPATIENT
Start: 2024-05-15 | End: 2024-05-22 | Stop reason: HOSPADM

## 2024-05-15 RX ORDER — SODIUM CHLORIDE, SODIUM LACTATE, POTASSIUM CHLORIDE, CALCIUM CHLORIDE 600; 310; 30; 20 MG/100ML; MG/100ML; MG/100ML; MG/100ML
INJECTION, SOLUTION INTRAVENOUS CONTINUOUS PRN
Status: DISCONTINUED | OUTPATIENT
Start: 2024-05-15 | End: 2024-05-15 | Stop reason: SDUPTHER

## 2024-05-15 RX ORDER — OXYCODONE HYDROCHLORIDE 5 MG/1
10 TABLET ORAL PRN
Status: DISCONTINUED | OUTPATIENT
Start: 2024-05-15 | End: 2024-05-15 | Stop reason: HOSPADM

## 2024-05-15 RX ORDER — ONDANSETRON 2 MG/ML
INJECTION INTRAMUSCULAR; INTRAVENOUS PRN
Status: DISCONTINUED | OUTPATIENT
Start: 2024-05-15 | End: 2024-05-15 | Stop reason: SDUPTHER

## 2024-05-15 RX ORDER — ENOXAPARIN SODIUM 100 MG/ML
40 INJECTION SUBCUTANEOUS DAILY
Status: DISCONTINUED | OUTPATIENT
Start: 2024-05-15 | End: 2024-05-17

## 2024-05-15 RX ORDER — FENTANYL CITRATE 50 UG/ML
INJECTION, SOLUTION INTRAMUSCULAR; INTRAVENOUS PRN
Status: DISCONTINUED | OUTPATIENT
Start: 2024-05-15 | End: 2024-05-15 | Stop reason: SDUPTHER

## 2024-05-15 RX ORDER — SODIUM CHLORIDE 9 MG/ML
INJECTION, SOLUTION INTRAVENOUS PRN
Status: DISCONTINUED | OUTPATIENT
Start: 2024-05-15 | End: 2024-05-22 | Stop reason: HOSPADM

## 2024-05-15 RX ORDER — CEFAZOLIN SODIUM IN 0.9 % NACL 2 G/100 ML
2000 PLASTIC BAG, INJECTION (ML) INTRAVENOUS EVERY 8 HOURS
Status: COMPLETED | OUTPATIENT
Start: 2024-05-15 | End: 2024-05-16

## 2024-05-15 RX ORDER — LIDOCAINE HYDROCHLORIDE 20 MG/ML
INJECTION, SOLUTION EPIDURAL; INFILTRATION; INTRACAUDAL; PERINEURAL PRN
Status: DISCONTINUED | OUTPATIENT
Start: 2024-05-15 | End: 2024-05-15 | Stop reason: SDUPTHER

## 2024-05-15 RX ORDER — ROCURONIUM BROMIDE 10 MG/ML
INJECTION, SOLUTION INTRAVENOUS PRN
Status: DISCONTINUED | OUTPATIENT
Start: 2024-05-15 | End: 2024-05-15 | Stop reason: SDUPTHER

## 2024-05-15 RX ORDER — SODIUM CHLORIDE 0.9 % (FLUSH) 0.9 %
5-40 SYRINGE (ML) INJECTION PRN
Status: DISCONTINUED | OUTPATIENT
Start: 2024-05-15 | End: 2024-05-15 | Stop reason: HOSPADM

## 2024-05-15 RX ORDER — DEXAMETHASONE SODIUM PHOSPHATE 4 MG/ML
INJECTION, SOLUTION INTRA-ARTICULAR; INTRALESIONAL; INTRAMUSCULAR; INTRAVENOUS; SOFT TISSUE PRN
Status: DISCONTINUED | OUTPATIENT
Start: 2024-05-15 | End: 2024-05-15 | Stop reason: SDUPTHER

## 2024-05-15 RX ORDER — SODIUM CHLORIDE 0.9 % (FLUSH) 0.9 %
5-40 SYRINGE (ML) INJECTION PRN
Status: DISCONTINUED | OUTPATIENT
Start: 2024-05-15 | End: 2024-05-22 | Stop reason: HOSPADM

## 2024-05-15 RX ORDER — SODIUM CHLORIDE 0.9 % (FLUSH) 0.9 %
5-40 SYRINGE (ML) INJECTION EVERY 12 HOURS SCHEDULED
Status: DISCONTINUED | OUTPATIENT
Start: 2024-05-15 | End: 2024-05-15 | Stop reason: HOSPADM

## 2024-05-15 RX ORDER — CEFAZOLIN SODIUM IN 0.9 % NACL 2 G/100 ML
2000 PLASTIC BAG, INJECTION (ML) INTRAVENOUS
Status: COMPLETED | OUTPATIENT
Start: 2024-05-15 | End: 2024-05-15

## 2024-05-15 RX ORDER — OXYCODONE HYDROCHLORIDE 5 MG/1
5 TABLET ORAL PRN
Status: DISCONTINUED | OUTPATIENT
Start: 2024-05-15 | End: 2024-05-15 | Stop reason: HOSPADM

## 2024-05-15 RX ADMIN — SERTRALINE HYDROCHLORIDE 75 MG: 50 TABLET ORAL at 21:40

## 2024-05-15 RX ADMIN — ENOXAPARIN SODIUM 40 MG: 100 INJECTION SUBCUTANEOUS at 21:41

## 2024-05-15 RX ADMIN — EPHEDRINE SULFATE 10 MG: 50 INJECTION INTRAVENOUS at 17:26

## 2024-05-15 RX ADMIN — DEXAMETHASONE SODIUM PHOSPHATE 4 MG: 4 INJECTION, SOLUTION INTRAMUSCULAR; INTRAVENOUS at 16:42

## 2024-05-15 RX ADMIN — ROCURONIUM BROMIDE 20 MG: 50 INJECTION, SOLUTION INTRAVENOUS at 17:18

## 2024-05-15 RX ADMIN — SUGAMMADEX 200 MG: 100 INJECTION, SOLUTION INTRAVENOUS at 18:10

## 2024-05-15 RX ADMIN — SODIUM CHLORIDE: 4.5 INJECTION, SOLUTION INTRAVENOUS at 20:10

## 2024-05-15 RX ADMIN — DONEPEZIL HYDROCHLORIDE 10 MG: 5 TABLET, FILM COATED ORAL at 21:36

## 2024-05-15 RX ADMIN — Medication 2000 MG: at 20:18

## 2024-05-15 RX ADMIN — EPHEDRINE SULFATE 10 MG: 50 INJECTION INTRAVENOUS at 17:22

## 2024-05-15 RX ADMIN — MORPHINE SULFATE 4 MG: 4 INJECTION, SOLUTION INTRAMUSCULAR; INTRAVENOUS at 10:11

## 2024-05-15 RX ADMIN — LISINOPRIL 10 MG: 10 TABLET ORAL at 10:25

## 2024-05-15 RX ADMIN — Medication 2 AMPULE: at 16:30

## 2024-05-15 RX ADMIN — Medication 2000 MG: at 16:42

## 2024-05-15 RX ADMIN — PROPOFOL 100 MG: 10 INJECTION, EMULSION INTRAVENOUS at 16:42

## 2024-05-15 RX ADMIN — LIDOCAINE HYDROCHLORIDE 60 MG: 20 INJECTION, SOLUTION EPIDURAL; INFILTRATION; INTRACAUDAL; PERINEURAL at 16:42

## 2024-05-15 RX ADMIN — MORPHINE SULFATE 4 MG: 4 INJECTION, SOLUTION INTRAMUSCULAR; INTRAVENOUS at 14:57

## 2024-05-15 RX ADMIN — SODIUM CHLORIDE, SODIUM LACTATE, POTASSIUM CHLORIDE, AND CALCIUM CHLORIDE: .6; .31; .03; .02 INJECTION, SOLUTION INTRAVENOUS at 16:39

## 2024-05-15 RX ADMIN — ROCURONIUM BROMIDE 50 MG: 50 INJECTION, SOLUTION INTRAVENOUS at 16:42

## 2024-05-15 RX ADMIN — OXYCODONE HYDROCHLORIDE 5 MG: 5 TABLET ORAL at 21:48

## 2024-05-15 RX ADMIN — ONDANSETRON 4 MG: 2 INJECTION INTRAMUSCULAR; INTRAVENOUS at 16:42

## 2024-05-15 RX ADMIN — FENTANYL CITRATE 50 MCG: 50 INJECTION, SOLUTION INTRAMUSCULAR; INTRAVENOUS at 17:18

## 2024-05-15 ASSESSMENT — PAIN SCALES - WONG BAKER
WONGBAKER_NUMERICALRESPONSE: NO HURT

## 2024-05-15 ASSESSMENT — PAIN SCALES - PAIN ASSESSMENT IN ADVANCED DEMENTIA (PAINAD)
CONSOLABILITY: DISTRACTED OR REASSURED BY VOICE/TOUCH
CONSOLABILITY: DISTRACTED OR REASSURED BY VOICE/TOUCH
BODYLANGUAGE: RELAXED
NEGVOCALIZATION: OCCASIONAL MOAN/GROAN, LOW SPEECH, NEGATIVE/DISAPPROVING QUALITY
BREATHING: NORMAL
TOTALSCORE: 3
BODYLANGUAGE: RELAXED
BREATHING: NORMAL
TOTALSCORE: 3
TOTALSCORE: 3
BODYLANGUAGE: RELAXED
NEGVOCALIZATION: OCCASIONAL MOAN/GROAN, LOW SPEECH, NEGATIVE/DISAPPROVING QUALITY
FACIALEXPRESSION: SAD, FRIGHTENED, FROWN
FACIALEXPRESSION: SAD, FRIGHTENED, FROWN
NEGVOCALIZATION: OCCASIONAL MOAN/GROAN, LOW SPEECH, NEGATIVE/DISAPPROVING QUALITY
FACIALEXPRESSION: SAD, FRIGHTENED, FROWN
BREATHING: NORMAL
CONSOLABILITY: DISTRACTED OR REASSURED BY VOICE/TOUCH

## 2024-05-15 ASSESSMENT — PAIN DESCRIPTION - ORIENTATION
ORIENTATION: RIGHT

## 2024-05-15 ASSESSMENT — PAIN DESCRIPTION - ONSET: ONSET: ON-GOING

## 2024-05-15 ASSESSMENT — PAIN SCALES - GENERAL
PAINLEVEL_OUTOF10: 8
PAINLEVEL_OUTOF10: 0
PAINLEVEL_OUTOF10: 3
PAINLEVEL_OUTOF10: 0
PAINLEVEL_OUTOF10: 8
PAINLEVEL_OUTOF10: 0
PAINLEVEL_OUTOF10: 5

## 2024-05-15 ASSESSMENT — PAIN - FUNCTIONAL ASSESSMENT
PAIN_FUNCTIONAL_ASSESSMENT: PREVENTS OR INTERFERES WITH MANY ACTIVE NOT PASSIVE ACTIVITIES
PAIN_FUNCTIONAL_ASSESSMENT: ACTIVITIES ARE NOT PREVENTED
PAIN_FUNCTIONAL_ASSESSMENT: PAIN ASSESSMENT IN ADVANCED DEMENTIA (PAINAD)
PAIN_FUNCTIONAL_ASSESSMENT: PREVENTS OR INTERFERES WITH MANY ACTIVE NOT PASSIVE ACTIVITIES

## 2024-05-15 ASSESSMENT — PAIN DESCRIPTION - DESCRIPTORS
DESCRIPTORS: SPASM;SHARP
DESCRIPTORS: ACHING;DISCOMFORT
DESCRIPTORS: ACHING;DISCOMFORT;THROBBING

## 2024-05-15 ASSESSMENT — PAIN DESCRIPTION - LOCATION
LOCATION: HIP

## 2024-05-15 ASSESSMENT — PAIN DESCRIPTION - PAIN TYPE: TYPE: ACUTE PAIN;SURGICAL PAIN

## 2024-05-15 ASSESSMENT — PAIN DESCRIPTION - FREQUENCY: FREQUENCY: CONTINUOUS

## 2024-05-15 NOTE — PROGRESS NOTES
Hospital Medicine Progress Note      Date of Admission: 5/14/2024  Hospital Day: 2    Chief Admission Complaint:   R hip pain     Subjective:  no new c/o    Presenting Admission History:         89 y.o. female who lives independently at home w/ hx HTN/CKD and mild dementia who presented to [facility] with acute severe R hip pain after witnessed mechanical fall w/out head trauma or LOC.  Pain is worse w/ movement and is unable to bear weight.      She denies any CP or SOB suggestive of active ischemia/failure.         The patient denies any fever/chills or other signs/sxs of systemic illness or identifiable aggravating/alleviating factors\"       Assessment/Plan:      Current Principal Problem:  Closed displaced fracture of right femoral neck (HCC)      R Hip Fracture - Acute fx 2nd to mechanical fall.  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  Ortho consulted and appreciated in advance.  OK to OR w/out further Cardiopulmonary evaluation w/out signs/sxs of active ischemia/failure.  Continue post-op mgt/rehab plan per ortho.  PO/IV Narcotic analgesia as ordered.  Closely following mental and respiratory status as well as vitals as documented in this note and the medical record for evidence ADR due to IV Narcotic analgesia     Anemia - etiology clinically unable to determine, w/out evidence of active bleeding/hemolysis. Stable and asymptomatic w/out indication for transfusion. Follow serial labs - documented and reviewed.      CKD - baseline stage 2.  Follow serial labs.  Reviewed and documented.     HTN - w/out known CAD and no evidence of active signs/sxs of ischemia/failure. Currently controlled on home meds w/ vitals documented and reviewed.     Dementia - w/out behavioral disturbance.  Controlled on home medication regimen - continued.  Continue supportive care and  Nightly    rOPINIRole  0.25 mg Oral Nightly    sertraline  100 mg Oral QPM    sodium chloride flush  5-40 mL IntraVENous 2 times per day     PRN Meds: ALPRAZolam, sodium chloride flush, sodium chloride, ondansetron **OR** ondansetron, polyethylene glycol, acetaminophen **OR** acetaminophen, oxyCODONE **OR** oxyCODONE, morphine **OR** morphine     Labs:  Personally reviewed and interpreted for clinical significance.     Recent Labs     05/14/24  1628 05/15/24  0549   WBC 5.8 6.1   HGB 9.7* 9.3*   HCT 30.2* 28.5*    166     Recent Labs     05/14/24  1628 05/15/24  0549    138   K 4.7 5.0    103   CO2 24 24   BUN 32* 29*   CREATININE 1.2 1.1   CALCIUM 9.5 8.6   MG  --  2.10   PHOS  --  4.1     Recent Labs     05/14/24  2131   PROBNP 913*   TROPHS 32*     No results for input(s): \"LABA1C\" in the last 72 hours.  Recent Labs     05/14/24  1628   AST 19   ALT 15   BILITOT <0.2   ALKPHOS 67     Recent Labs     05/14/24  2131   INR 1.02       Urine Cultures:   Lab Results   Component Value Date/Time    LABURIN No growth at 18 to 36 hours 10/01/2021 04:38 PM     Blood Cultures:   Lab Results   Component Value Date/Time    BC No Growth after 4 days of incubation. 01/28/2024 06:01 PM     Lab Results   Component Value Date/Time    BLOODCULT2 No Growth after 4 days of incubation. 01/29/2024 12:59 AM     Organism:   Lab Results   Component Value Date/Time    ORG Enterococcus faecalis 07/17/2018 10:36 AM         Trevon Sahu MD

## 2024-05-15 NOTE — CARE COORDINATION
Case Management Assessment  Initial Evaluation    Date/Time of Evaluation: 5/15/2024 10:51 AM  Assessment Completed by: Bernarda Alvarado RN    If patient is discharged prior to next notation, then this note serves as note for discharge by case management.    Patient Name: Susan Singh                   YOB: 1935  Diagnosis: Fall, initial encounter [W19.XXXA]  Hip fracture requiring operative repair, right, closed, initial encounter ([facility]) [S72.001A]  Closed displaced fracture of right femoral neck ([facility]) [S72.001A]                   Date / Time: 5/14/2024  4:18 PM    Patient Admission Status: Inpatient   Readmission Risk (Low < 19, Mod (19-27), High > 27): Readmission Risk Score: 14.1    Current PCP: No primary care provider on file.  PCP verified by CM? Yes    Chart Reviewed: Yes      History Provided by: Child/Family  Patient Orientation: Unable to Assess (not responding to questions)    Patient Cognition: Dementia / Early Alzheimer's    Hospitalization in the last 30 days (Readmission):  No    If yes, Readmission Assessment in CM Navigator will be completed.    Advance Directives:      Code Status: Full Code   Patient's Primary Decision Maker is: Named in Scanned ACP Document    Primary Decision Maker: Arely Dickerson - Child - [phone]    Primary Decision Maker: mary patrick - Child - [phone]    Discharge Planning:    Patient lives with: Children, Family Members Type of Home: House  Primary Care Giver: Family  Patient Support Systems include: Children, Family Members, Friends/Neighbors   Current Financial resources: Medicare  Current community resources: None  Current services prior to admission: Other (Comment), Private Duty Homecare ([facility] )            Current DME:              Type of Home Care services:  Aide Services    ADLS  Prior functional level: Assistance with the following:, Housework, Cooking, Shopping, Bathing  Current functional level: Assistance with

## 2024-05-15 NOTE — PLAN OF CARE
Problem: Safety - Adult  Goal: Free from fall injury  Outcome: Progressing     Problem: Discharge Planning  Goal: Discharge to home or other facility with appropriate resources  Outcome: Progressing  Flowsheets (Taken 5/14/2024 2310)  Discharge to home or other facility with appropriate resources: Identify barriers to discharge with patient and caregiver     Problem: Pain  Goal: Verbalizes/displays adequate comfort level or baseline comfort level  Outcome: Progressing  Flowsheets (Taken 5/14/2024 2100)  Verbalizes/displays adequate comfort level or baseline comfort level: Encourage patient to monitor pain and request assistance

## 2024-05-15 NOTE — ANESTHESIA PRE PROCEDURE
Department of Anesthesiology  Preprocedure Note       Name:  Susan Singh   Age:  89 y.o.  :  1935                                          MRN:  [id number]         Date:  5/15/2024      Surgeon: Surgeon(s):  Heidi Mosley MD    Procedure: Procedure(s):  RIGHT HIP HEMIARTHROPLASTY    Medications prior to admission:   Prior to Admission medications    Medication Sig Start Date End Date Taking? Authorizing Provider   hydrocortisone 2.5 % cream Apply topically 2 times daily. 24  Yes Marcos uRbalcava PA-C   ALPRAZolam (XANAX) 0.25 MG tablet Take 1 tablet by mouth 3 times daily as needed for Sleep.   Yes Nereida Huerta MD   sertraline (ZOLOFT) 100 MG tablet Take 75 mg by mouth every evening   Yes Nereida Huerat MD   rOPINIRole (REQUIP) 0.25 MG tablet Take 1 tablet by mouth nightly  Patient not taking: Reported on 2024   Yes Nereida Huerta MD   mirtazapine (REMERON) 15 MG tablet Take 1 tablet by mouth nightly  Patient not taking: Reported on 2024   Yes ProviderNereida MD   Multiple Vitamins-Minerals (THERAPEUTIC MULTIVITAMIN-MINERALS) tablet Take 1 tablet by mouth daily   Yes ProviderNereida MD   melatonin 5 MG TABS tablet Take 1 tablet by mouth at bedtime   Yes ProviderNereida MD   magnesium (MAGNESIUM-OXIDE) 250 MG TABS tablet Take 1 tablet by mouth at bedtime  Patient not taking: Reported on 2024   Yes Nereida Huerta MD   diclofenac sodium (VOLTAREN) 1 % GEL Apply 4 g topically 4 times daily as needed for Pain 22  Yes Osmar Brandon DO   donepezil (ARICEPT) 10 MG tablet 1 tablet 22  Yes ProviderNereida MD   fluticasone (FLONASE) 50 MCG/ACT nasal spray 1 spray by Each Nostril route daily 10/18/22  Yes Osmar Brandon DO   lisinopril (PRINIVIL;ZESTRIL) 20 MG tablet Take 1 tablet by mouth once daily  Patient taking differently: Take 0.5 tablets by mouth daily 10/17/22  Yes Osmar Brandon DO   Handicap Placard MISC by

## 2024-05-15 NOTE — PROGRESS NOTES
Pt arrived to unit, A&O x 1-2 (baseline per pt daughter at bedside), oriented to room and use of call light. Pt c/o pain in R hip/leg 8/10, prn morphine administered per order, pt satisfied, now resting comfortably in bed, call light in reach, bed alarm on for safety. Will cont to monitor.

## 2024-05-15 NOTE — PROGRESS NOTES
Patient arrived to PACU bay 9, phase one initiated. Placed on bedside monitor, VSS. Report obtained from OR RN and anesthesia. Patient on O2 via nasal cannula at 4L. See flowsheets for assessment. Warm blankets applied.Pt's hand cold; warm pack placed and wrapped in warm blankets.  Side rails in place, will monitor patient closely.

## 2024-05-15 NOTE — ED NOTES
Ms. Singh is a 89 y.o. female who had concerns including Fall (Right hip pain, t-pod placed, pt was confused at scene, does not remember falling, lives at home alone).    Chief Complaint   Patient presents with    Fall     Right hip pain, t-pod placed, pt was confused at scene, does not remember falling, lives at home alone       She is being admitted for:    Closed displaced fracture of right femoral neck (HCC)    Her ED problem list included:    1. Closed displaced fracture of right femoral neck (HCC)    2. Fall, initial encounter        Past Medical History:   Diagnosis Date    Arthritis     Chicken pox     Chills     CKD (chronic kidney disease) stage 3, GFR 30-59 ml/min ([facility]) 1/10/2019    Current moderate episode of major depressive disorder without prior episode ([facility]) 4/28/2022    Fever     Hip pain     Hyperlipidemia     Hypertension     Measles     Miscarriage     Osteoarthritis     Pneumonia     Poor circulation     Ringing in ears     Sacroiliitis ([facility]) 4/15/2016    Sensorineural hearing loss, bilateral 10/20/2020    Swelling of both ankles     Tonsillitis        Past Surgical History:   Procedure Laterality Date    COLONOSCOPY  11/09/2016    normal/ hemmoroids    COLONOSCOPY N/A 5/3/2021    COLONOSCOPY WITH BIOPSY performed by Reyes Slaughter MD at [facility]    OTHER SURGICAL HISTORY      COLONOSCOPY WITH BIOPSY (N/A )    SHOULDER SURGERY         Her recent abnormal labs were:    Labs Reviewed   CBC WITH AUTO DIFFERENTIAL - Abnormal; Notable for the following components:       Result Value    RBC 3.28 (*)     Hemoglobin 9.7 (*)     Hematocrit 30.2 (*)     RDW 16.3 (*)     All other components within normal limits   COMPREHENSIVE METABOLIC PANEL - Abnormal; Notable for the following components:    Glucose 100 (*)     BUN 32 (*)     Est, Glom Filt Rate 44 (*)     All other components within normal limits   URINALYSIS WITH REFLEX TO CULTURE   TROPONIN   BRAIN NATRIURETIC PEPTIDE   APTT

## 2024-05-15 NOTE — PROGRESS NOTES
Informed procedure and anesthesia consents signed. Pt is only oriented to person at this time and has history of dementia. Pt's daughters Lucia and Arely at the bedside to sign forms. Pt has patent IV to RAC which is flushed and infusing LR IVF by gravity.

## 2024-05-15 NOTE — BRIEF OP NOTE
Brief Postoperative Note      Patient: Susan Singh  YOB: 1935  MRN: [id number]    Date of Procedure: 5/15/2024    Pre-Op Diagnosis Codes:      fracture of neck of right femur, initial encounter     Post-Op Diagnosis: Same       Procedure(s):  RIGHT HIP HEMIARTHROPLASTY    Surgeon(s):  Heidi Mosley MD    Assistant:  Surgical Assistant: Janay Nunez    Anesthesia: General    Estimated Blood Loss (mL): less than 50     Complications: None    Specimens:   * No specimens in log *    Implants:  Implant Name Type Inv. Item Serial No.  Lot No. LRB No. Used Action   HEAD FEM [id number] +0MM OFFSET HIP CO CHROM V40 TAPR LO FRIC - [id number]  HEAD FEM [id number] +0MM OFFSET HIP CO CHROM V40 TAPR LO FRIC  [employer] [facility] [id number] Right 1 Implanted   HEAD FEM OD47MM ID26MM HIP CO CHROM POLYETH BPLR CEMENTLESS - [id number]  HEAD FEM OD47MM ID26MM HIP CO CHROM POLYETH BPLR CEMENTLESS  [employer] [facility] [id number] Right 1 Implanted   STEM FEM SZ 3 L102MM NK L30MM 38MM OFFSET 127DEG HIP TI - [id number]  STEM FEM SZ 3 L102MM NK L30MM 38MM OFFSET 127DEG HIP TI  [employer] [facility] [id number] Right 1 Implanted         Drains:   Urinary Catheter 05/14/24 Enriquez (Active)   $ Urethral catheter insertion $ Not inserted for procedure 05/15/24 0939   Catheter Indications Prolonged immobilization (e.g. unstable thoracic or lumbar spine, multiple traumatic injuries such as pelvic fractures) 05/15/24 0939   Site Assessment No urethral drainage 05/15/24 0939   Urine Color Yellow 05/15/24 0939   Urine Appearance Clear 05/15/24 0939   Urine Odor Malodorous 05/15/24 0939   Collection Container Standard 05/15/24 0939   Securement Method Securing device (Describe) 05/15/24 0939   Catheter Care  Soap and water 05/15/24 0939   Catheter Best Practices  Drainage tube clipped to bed;Catheter secured to thigh;Tamper seal intact;Bag below

## 2024-05-16 LAB
ALBUMIN SERPL-MCNC: 3.2 G/DL (ref 3.4–5)
ANION GAP SERPL CALCULATED.3IONS-SCNC: 8 MMOL/L (ref 3–16)
BUN SERPL-MCNC: 27 MG/DL (ref 7–20)
CALCIUM SERPL-MCNC: 8.2 MG/DL (ref 8.3–10.6)
CHLORIDE SERPL-SCNC: 102 MMOL/L (ref 99–110)
CO2 SERPL-SCNC: 25 MMOL/L (ref 21–32)
CREAT SERPL-MCNC: 1.3 MG/DL (ref 0.6–1.2)
DEPRECATED RDW RBC AUTO: 15.4 % (ref 12.4–15.4)
GFR SERPLBLD CREATININE-BSD FMLA CKD-EPI: 40 ML/MIN/{1.73_M2}
GLUCOSE SERPL-MCNC: 119 MG/DL (ref 70–99)
HCT VFR BLD AUTO: 24.1 % (ref 36–48)
HGB BLD-MCNC: 7.8 G/DL (ref 12–16)
MCH RBC QN AUTO: 30 PG (ref 26–34)
MCHC RBC AUTO-ENTMCNC: 32.4 G/DL (ref 31–36)
MCV RBC AUTO: 92.6 FL (ref 80–100)
PHOSPHATE SERPL-MCNC: 3.6 MG/DL (ref 2.5–4.9)
PLATELET # BLD AUTO: 140 K/UL (ref 135–450)
PMV BLD AUTO: 8.2 FL (ref 5–10.5)
POTASSIUM SERPL-SCNC: 5.3 MMOL/L (ref 3.5–5.1)
RBC # BLD AUTO: 2.6 M/UL (ref 4–5.2)
SODIUM SERPL-SCNC: 135 MMOL/L (ref 136–145)
WBC # BLD AUTO: 5.8 K/UL (ref 4–11)

## 2024-05-16 PROCEDURE — 6360000002 HC RX W HCPCS: Performed by: ORTHOPAEDIC SURGERY

## 2024-05-16 PROCEDURE — 99024 POSTOP FOLLOW-UP VISIT: CPT | Performed by: SPECIALIST/TECHNOLOGIST

## 2024-05-16 PROCEDURE — 6370000000 HC RX 637 (ALT 250 FOR IP): Performed by: SPECIALIST/TECHNOLOGIST

## 2024-05-16 PROCEDURE — 97163 PT EVAL HIGH COMPLEX 45 MIN: CPT

## 2024-05-16 PROCEDURE — 6360000002 HC RX W HCPCS: Performed by: NURSE PRACTITIONER

## 2024-05-16 PROCEDURE — 97530 THERAPEUTIC ACTIVITIES: CPT

## 2024-05-16 PROCEDURE — 6370000000 HC RX 637 (ALT 250 FOR IP): Performed by: ORTHOPAEDIC SURGERY

## 2024-05-16 PROCEDURE — 1200000000 HC SEMI PRIVATE

## 2024-05-16 PROCEDURE — APPNB45 APP NON BILLABLE 31-45 MINUTES: Performed by: SPECIALIST/TECHNOLOGIST

## 2024-05-16 PROCEDURE — 97166 OT EVAL MOD COMPLEX 45 MIN: CPT

## 2024-05-16 PROCEDURE — 51798 US URINE CAPACITY MEASURE: CPT

## 2024-05-16 PROCEDURE — 85027 COMPLETE CBC AUTOMATED: CPT

## 2024-05-16 PROCEDURE — 80069 RENAL FUNCTION PANEL: CPT

## 2024-05-16 PROCEDURE — 94761 N-INVAS EAR/PLS OXIMETRY MLT: CPT

## 2024-05-16 PROCEDURE — 2580000003 HC RX 258: Performed by: ORTHOPAEDIC SURGERY

## 2024-05-16 PROCEDURE — 2700000000 HC OXYGEN THERAPY PER DAY

## 2024-05-16 RX ORDER — METHOCARBAMOL 500 MG/1
500 TABLET, FILM COATED ORAL 3 TIMES DAILY
Status: DISCONTINUED | OUTPATIENT
Start: 2024-05-16 | End: 2024-05-22 | Stop reason: HOSPADM

## 2024-05-16 RX ORDER — POLYETHYLENE GLYCOL 3350 17 G/17G
17 POWDER, FOR SOLUTION ORAL DAILY
Status: DISCONTINUED | OUTPATIENT
Start: 2024-05-16 | End: 2024-05-19

## 2024-05-16 RX ORDER — ACETAMINOPHEN 325 MG/1
650 TABLET ORAL EVERY 6 HOURS
Status: DISCONTINUED | OUTPATIENT
Start: 2024-05-16 | End: 2024-05-22 | Stop reason: HOSPADM

## 2024-05-16 RX ADMIN — DONEPEZIL HYDROCHLORIDE 10 MG: 5 TABLET, FILM COATED ORAL at 20:41

## 2024-05-16 RX ADMIN — OXYCODONE 10 MG: 5 TABLET ORAL at 14:53

## 2024-05-16 RX ADMIN — Medication 2000 MG: at 04:08

## 2024-05-16 RX ADMIN — ENOXAPARIN SODIUM 40 MG: 100 INJECTION SUBCUTANEOUS at 08:47

## 2024-05-16 RX ADMIN — HYDROMORPHONE HYDROCHLORIDE 0.25 MG: 1 INJECTION, SOLUTION INTRAMUSCULAR; INTRAVENOUS; SUBCUTANEOUS at 12:04

## 2024-05-16 RX ADMIN — SODIUM CHLORIDE: 4.5 INJECTION, SOLUTION INTRAVENOUS at 10:03

## 2024-05-16 RX ADMIN — ACETAMINOPHEN 650 MG: 325 TABLET ORAL at 20:41

## 2024-05-16 RX ADMIN — Medication 5 MG: at 20:41

## 2024-05-16 RX ADMIN — POLYETHYLENE GLYCOL 3350 17 G: 17 POWDER, FOR SOLUTION ORAL at 14:51

## 2024-05-16 RX ADMIN — METHOCARBAMOL 500 MG: 500 TABLET ORAL at 20:41

## 2024-05-16 RX ADMIN — ONDANSETRON 4 MG: 2 INJECTION INTRAMUSCULAR; INTRAVENOUS at 17:11

## 2024-05-16 RX ADMIN — LISINOPRIL 10 MG: 10 TABLET ORAL at 08:44

## 2024-05-16 RX ADMIN — OXYCODONE HYDROCHLORIDE 5 MG: 5 TABLET ORAL at 04:06

## 2024-05-16 RX ADMIN — ACETAMINOPHEN 650 MG: 325 TABLET ORAL at 14:51

## 2024-05-16 RX ADMIN — METHOCARBAMOL 500 MG: 500 TABLET ORAL at 15:25

## 2024-05-16 RX ADMIN — OXYCODONE HYDROCHLORIDE 5 MG: 5 TABLET ORAL at 10:51

## 2024-05-16 RX ADMIN — SERTRALINE HYDROCHLORIDE 75 MG: 50 TABLET ORAL at 23:01

## 2024-05-16 RX ADMIN — SODIUM CHLORIDE: 4.5 INJECTION, SOLUTION INTRAVENOUS at 23:01

## 2024-05-16 ASSESSMENT — PAIN SCALES - PAIN ASSESSMENT IN ADVANCED DEMENTIA (PAINAD)
BODYLANGUAGE: RELAXED
TOTALSCORE: 3
NEGVOCALIZATION: OCCASIONAL MOAN/GROAN, LOW SPEECH, NEGATIVE/DISAPPROVING QUALITY
TOTALSCORE: 3
BREATHING: NORMAL
NEGVOCALIZATION: OCCASIONAL MOAN/GROAN, LOW SPEECH, NEGATIVE/DISAPPROVING QUALITY
TOTALSCORE: 3
FACIALEXPRESSION: SAD, FRIGHTENED, FROWN
BODYLANGUAGE: RELAXED
FACIALEXPRESSION: SAD, FRIGHTENED, FROWN
BREATHING: NORMAL
CONSOLABILITY: DISTRACTED OR REASSURED BY VOICE/TOUCH
BODYLANGUAGE: RELAXED
FACIALEXPRESSION: SAD, FRIGHTENED, FROWN
BREATHING: NORMAL
NEGVOCALIZATION: OCCASIONAL MOAN/GROAN, LOW SPEECH, NEGATIVE/DISAPPROVING QUALITY
CONSOLABILITY: DISTRACTED OR REASSURED BY VOICE/TOUCH
CONSOLABILITY: DISTRACTED OR REASSURED BY VOICE/TOUCH

## 2024-05-16 ASSESSMENT — PAIN DESCRIPTION - ORIENTATION
ORIENTATION: RIGHT

## 2024-05-16 ASSESSMENT — PAIN - FUNCTIONAL ASSESSMENT
PAIN_FUNCTIONAL_ASSESSMENT: ACTIVITIES ARE NOT PREVENTED
PAIN_FUNCTIONAL_ASSESSMENT: INTOLERABLE, UNABLE TO DO ANY ACTIVE OR PASSIVE ACTIVITIES

## 2024-05-16 ASSESSMENT — PAIN SCALES - WONG BAKER
WONGBAKER_NUMERICALRESPONSE: NO HURT
WONGBAKER_NUMERICALRESPONSE: NO HURT
WONGBAKER_NUMERICALRESPONSE: HURTS WHOLE LOT
WONGBAKER_NUMERICALRESPONSE: HURTS LITTLE MORE
WONGBAKER_NUMERICALRESPONSE: NO HURT

## 2024-05-16 ASSESSMENT — PAIN DESCRIPTION - PAIN TYPE: TYPE: SURGICAL PAIN

## 2024-05-16 ASSESSMENT — PAIN SCALES - GENERAL
PAINLEVEL_OUTOF10: 5
PAINLEVEL_OUTOF10: 5
PAINLEVEL_OUTOF10: 0
PAINLEVEL_OUTOF10: 7

## 2024-05-16 ASSESSMENT — PAIN DESCRIPTION - DESCRIPTORS: DESCRIPTORS: SHARP

## 2024-05-16 ASSESSMENT — PAIN DESCRIPTION - LOCATION
LOCATION: HIP

## 2024-05-16 NOTE — OP NOTE
[address] [address]                            OPERATIVE REPORT      PATIENT NAME: CHANDNI GARCIA            : 1935  MED REC NO: [id number]                      ROOM: 0542  ACCOUNT NO: [id number]                       ADMIT DATE: 2024  PROVIDER: Heidi Mosley MD      DATE OF PROCEDURE:  05/15/2024    SURGEON:  Heidi Mosley MD    ASSISTANT:  Cheryl surgical assistant.    PREOPERATIVE DIAGNOSIS:  Right hip displaced femoral neck fracture.    POSTOPERATIVE DIAGNOSIS:  Right hip displaced femoral neck fracture.    PROCEDURE DONE:  Open treatment of right femoral neck fracture with bipolar press-fit hemiarthroplasty.    ANESTHESIA:  General anesthesia.    ESTIMATED BLOOD LOSS:  Less then 100 mL.    COMPLICATIONS:  None.    APPROACH:  Anterolateral approach.    IMPLANT USED:  [place] Accolade II stem size #3 with hip size 47 mm 0 offset.    INDICATIONS:  This is an 89-year-old white female with dementia who sustained a fall with right hip pain.  She was brought by EMS to [facility] from home where she lives with her daughter.  She was found to have a displaced right femoral neck fracture.  All risks, benefits, and alternatives were discussed with the patient's daughter and she agreed to proceed with surgical fixation with bipolar hemiarthroplasty.    DESCRIPTION OF THE PROCEDURE:  The patient's right hip was marked.  She received 2 g Ancef IV preoperatively.  The patient was then brought to the operating room, underwent general anesthesia.  She was then placed on the left lateral decubitus position with the right hip up.  The right hip and lower extremity were then prepped and draped in regular sterile routine fashion.    Time-out was called confirming the patient's name, site, and procedure.    We used an anterolateral approach.  Incision made over the lateral aspect of the right hip.  The fascia farhad was incised

## 2024-05-16 NOTE — PROGRESS NOTES
Colonoscopy (11/09/2016); other surgical history; Colonoscopy (N/A, 5/3/2021); and hip surgery (Right, 5/15/2024).    Assessment    Co-tx collaboration with PT  this date to safely meet goals and promote increased occupational performance outcomes with in a co-treatment than 1:1 session.  Pt admitted to [facility] with R hip fx s/p chelsea arthroplasty. Eval was limited today 2/2 pain and decreased alertness. Pt's family reports that pt requires intermittent assist with ADLs @ baseline depending on if she's having a \"good day\" or not. At baseline, pt IND for fxl mobility (no AD in home, walker outside of home). Pt would benefit from skilled OT services to further evaluate and address pt's fxn with ADLs and fxl mobility.      Performance deficits / Impairments: Decreased functional mobility ;Decreased safe awareness;Decreased balance;Decreased ADL status;Decreased cognition;Decreased endurance;Decreased strength  Prognosis: Fair  Decision Making: Medium Complexity  REQUIRES OT FOLLOW-UP: Yes  Activity Tolerance  Activity Tolerance: Patient limited by pain;Patient limited by fatigue;Treatment limited secondary to decreased cognition        Plan   Occupational Therapy Plan  Times Per Week: 4-6x per week  Current Treatment Recommendations: Balance training, Strengthening, Functional mobility training, Endurance training, Gait training, Safety education & training, Pain management, Patient/Caregiver education & training, Equipment evaluation, education, & procurement, Modalities, Positioning, Self-Care / ADL, Co-Treatment     Restrictions  Restrictions/Precautions  Restrictions/Precautions: Fall Risk, Weight Bearing  Lower Extremity Weight Bearing Restrictions  Right Lower Extremity Weight Bearing: Weight Bearing As Tolerated  Position Activity Restriction  Other position/activity restrictions: IV, tele    Subjective   General  Patient assessed for rehabilitation services?: Yes  denies pain @ rest, but pt verbalizing pain when  attempting bed mobility    Social/Functional History  Social/Functional History  Lives With: Daughter  Type of Home: House  Home Layout: One level  Home Access: Stairs to enter without rails  Bathroom Shower/Tub: Tub/Shower unit  Bathroom Toilet: Standard  Bathroom Equipment: Tub transfer bench  Home Equipment: Walker - Rolling  Has the patient had two or more falls in the past year or any fall with injury in the past year?: Yes  Receives Help From: Friend(s), Family  ADL Assistance: Needs assistance  Ambulation Assistance: Independent  Active : No       Objective   Temp: 98.6 °F (37 °C)  Pulse: (!) 107  Heart Rate Source: Monitor  Respirations: 14  SpO2: 95 %  O2 Device: Nasal cannula (1L)  BP: 108/62  MAP (Calculated): 77  BP Location: Left upper arm  BP Method: Automatic  Patient Position: Semi fowlers             Safety Devices  Type of Devices: Left in bed;Bed alarm in place;Call light within reach;Nurse notified;Gait belt;Patient at risk for falls    Bed Mobility Training  Bed Mobility Training: Yes  Supine to Sit:  (Attempted to transition from supine to EOB with assist at BLE and increased time; pt showing increased signs of pain and unable to participate in task therefore deferred further mobility)       Activity Tolerance  Activity Tolerance: Patient tolerated evaluation without incident;Patient limited by pain;Treatment limited secondary to decreased cognition     Orientation  Overall Orientation Status: Impaired (baseline dementia)  Orientation Level: Oriented to person     Education Given To: Patient  Education Provided: Role of Therapy;Plan of Care;Orientation  Education Method: Verbal  Barriers to Learning: Cognition  Education Outcome: Continued education needed    AM-PAC - ADL  AM-PAC Daily Activity - Inpatient   How much help is needed for putting on and taking off regular lower body clothing?: Total  How much help is needed for bathing (which includes washing, rinsing, drying)?: Total  How

## 2024-05-16 NOTE — ANESTHESIA POSTPROCEDURE EVALUATION
Department of Anesthesiology  Postprocedure Note    Patient: Susan Singh  MRN: [id number]  YOB: 1935  Date of evaluation: 5/15/2024    Procedure Summary       Date: 05/15/24 Room / Location: [address]    Anesthesia Start: 1639 Anesthesia Stop: 1822    Procedure: RIGHT HIP HEMIARTHROPLASTY (Right: Hip) Diagnosis:       Type I or II open fracture of neck of right femur, initial encounter ([facility])      (Type I or II open fracture of neck of right femur, initial encounter ([facility]) [S72.001B])    Surgeons: Heidi Mosley MD Responsible Provider: Gonzalez Esquivel MD    Anesthesia Type: general ASA Status: 3            Anesthesia Type: No value filed.    Willy Phase I: Willy Score: 9    Willy Phase II:      Anesthesia Post Evaluation    Patient location during evaluation: PACU  Level of consciousness: awake  Airway patency: patent  Nausea & Vomiting: no vomiting  Cardiovascular status: blood pressure returned to baseline  Respiratory status: acceptable  Hydration status: stable  Pain management: adequate    No notable events documented.

## 2024-05-16 NOTE — PROGRESS NOTES
Hospital Medicine Progress Note      Date of Admission: 5/14/2024  Hospital Day: 3    Chief Admission Complaint:   R hip pain     Subjective:  no new c/o    Presenting Admission History:         89 y.o. female who lives independently at home w/ hx HTN/CKD and mild dementia who presented to [facility] with acute severe R hip pain after witnessed mechanical fall w/out head trauma or LOC.  Pain is worse w/ movement and is unable to bear weight.      She denies any CP or SOB suggestive of active ischemia/failure.         The patient denies any fever/chills or other signs/sxs of systemic illness or identifiable aggravating/alleviating factors\"       Assessment/Plan:      Current Principal Problem:  Closed displaced fracture of right femoral neck (HCC)      R Hip Fracture - Acute fx 2nd to mechanical fall.  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  Ortho consulted and appreciated s/p hemiarthroplasty 15 May w/out complications.  Continue post-op mgt/rehab plan per ortho.  PO/IV Narcotic analgesia as ordered.  Closely following mental and respiratory status as well as vitals as documented in this note and the medical record for evidence ADR due to IV Narcotic analgesia     Anemia - etiology clinically unable to determine, w/out evidence of active bleeding/hemolysis. Stable and asymptomatic w/out indication for transfusion. Follow serial labs - documented and reviewed.      CKD - baseline stage 2.  Follow serial labs - stable.  Reviewed and documented.     HTN - w/out known CAD and no evidence of active signs/sxs of ischemia/failure. Currently controlled on home meds w/ vitals documented and reviewed.     Dementia - w/out behavioral disturbance.  Controlled on home medication regimen - continued.  Continue supportive care and redirection as needed.        Physical Exam  Oral Daily    magnesium oxide  400 mg Oral Nightly    melatonin  5 mg Oral Nightly    mirtazapine  15 mg Oral Nightly    rOPINIRole  0.25 mg Oral Nightly    sodium chloride flush  5-40 mL IntraVENous 2 times per day     PRN Meds: sodium chloride flush, sodium chloride, HYDROmorphone, ALPRAZolam, sodium chloride flush, sodium chloride, ondansetron **OR** ondansetron, polyethylene glycol, acetaminophen **OR** acetaminophen, oxyCODONE **OR** oxyCODONE     Labs:  Personally reviewed and interpreted for clinical significance.     Recent Labs     05/14/24  1628 05/15/24  0549 05/16/24  0549   WBC 5.8 6.1 5.8   HGB 9.7* 9.3* 7.8*   HCT 30.2* 28.5* 24.1*    166 140       Recent Labs     05/14/24  1628 05/15/24  0549 05/16/24  0549    138 135*   K 4.7 5.0 5.3*    103 102   CO2 24 24 25   BUN 32* 29* 27*   CREATININE 1.2 1.1 1.3*   CALCIUM 9.5 8.6 8.2*   MG  --  2.10  --    PHOS  --  4.1 3.6       Recent Labs     05/14/24  2131   PROBNP 913*   TROPHS 32*       No results for input(s): \"LABA1C\" in the last 72 hours.  Recent Labs     05/14/24  1628   AST 19   ALT 15   BILITOT <0.2   ALKPHOS 67       Recent Labs     05/14/24  2131   INR 1.02         Urine Cultures:   Lab Results   Component Value Date/Time    LABURIN No growth at 18 to 36 hours 10/01/2021 04:38 PM     Blood Cultures:   Lab Results   Component Value Date/Time    BC No Growth after 4 days of incubation. 01/28/2024 06:01 PM     Lab Results   Component Value Date/Time    BLOODCULT2 No Growth after 4 days of incubation. 01/29/2024 12:59 AM     Organism:   Lab Results   Component Value Date/Time    ORG Enterococcus faecalis 07/17/2018 10:36 AM         Trevon Sahu MD

## 2024-05-16 NOTE — PROGRESS NOTES
Physical Therapy  Facility/Department: [place] [address] - MED SURG/ORTHO  Physical Therapy Initial Assessment/treatment    Name: Susan Singh  : 1935  MRN: [id number]  Date of Service: 2024    Discharge Recommendations:  Subacute/Skilled Nursing Facility   PT Equipment Recommendations  Equipment Needed: No    Therapy discharge recommendations are subject to collaboration from the patient’s interdisciplinary healthcare team, including MD and case management recommendations.    Barriers to Home Discharge:   [x] Steps to access home entry or bed/bath:   [x] Unable to transfer, ambulate, or propel wheelchair household distances without assist   [x] Limited available assist at home upon discharge    [] Patient or family requests d/c to post-acute facility    [x] Poor cognition/safety awareness for d/c to home alone    [] Unable to maintain ordered weight bearing status    [x] Patient with salient signs of long-standing immobility   [x] Decreased independence with ADLs   [x] Increased risk for falls   [] Other:    If pt is unable to be seen after this session, please let this note serve as discharge summary.  Please see case management note for discharge disposition.  Thank you.      Patient Diagnosis(es): The primary encounter diagnosis was Closed displaced fracture of right femoral neck (HCC). A diagnosis of Fall, initial encounter was also pertinent to this visit.  Past Medical History:  has a past medical history of Arthritis, Chicken pox, Chills, CKD (chronic kidney disease) stage 3, GFR 30-59 ml/min (HCC), Current moderate episode of major depressive disorder without prior episode (HCC), Fever, Hip pain, Hyperlipidemia, Hypertension, Measles, Miscarriage, Osteoarthritis, Pneumonia, Poor circulation, Ringing in ears, Sacroiliitis (HCC), Sensorineural hearing loss, bilateral, Swelling of both ankles, and Tonsillitis.  Past Surgical History:  has a past surgical history that includes shoulder surgery;  and decreased alertness    AM-PAC - Mobility    AM-PAC Basic Mobility - Inpatient   How much help is needed turning from your back to your side while in a flat bed without using bedrails?: Total  How much help is needed moving from lying on your back to sitting on the side of a flat bed without using bedrails?: Total  How much help is needed moving to and from a bed to a chair?: Total  How much help is needed standing up from a chair using your arms?: Total  How much help is needed walking in hospital room?: Total  How much help is needed climbing 3-5 steps with a railing?: Total  AM-PAC Inpatient Mobility Raw Score : 6  AM-PAC Inpatient T-Scale Score : 23.55  Mobility Inpatient CMS 0-100% Score: 100  Mobility Inpatient CMS G-Code Modifier : CN    Goals  Short Term Goals  Time Frame for Short Term Goals: 7 days (5/23/24) unless otherwise noted  Short Term Goal 1: Pt will perform supine <> sit with min(A)  Short Term Goal 2: Pt will perform STS with RW and mod(A)  Short Term Goal 3: Pt will perform SPT from bed to chair and mod(A) with RW  Short Term Goal 4: Pt will tolerate ambulation to set appropriate goal  Short Term Goal 5: Pt will tolerate 10 reps of BLE exercises by 5/19/24  Patient Goals   Patient Goals : none stated by patient       Education  Patient Education  Education Given To: Patient  Education Provided: Role of Therapy;Plan of Care;Family Education  Education Method: Verbal  Barriers to Learning: Cognition  Education Outcome: Continued education needed;Unable to demonstrate understanding      Therapy Time   Individual Concurrent Group Co-treatment   Time In       1133   Time Out       1157   Minutes       24   Timed Code Treatment Minutes: 10 Minutes (14 min eval)       Anika Mondragon, PT

## 2024-05-16 NOTE — PLAN OF CARE
Problem: Safety - Adult  Goal: Free from fall injury  Outcome: Progressing   Pt and daughter instructed to use call light for assistance. Bed in low position, 2/4 side rails up, bed alarm engaged, and call light within reach.

## 2024-05-16 NOTE — CARE COORDINATION
Writer spoke with daughter at bedside about the SNF preferences, she would like referrals sent to [facility], and [facility], referrals sent this day. Waiting PT/OT notes.     Bernarda Alvarado RN

## 2024-05-16 NOTE — PROGRESS NOTES
Department of Orthopedic Surgery  Physician Assistant   Progress Note    Subjective:     Systemic or Specific Complaints:Pt pleasant but confused. Appears to be in pain. Enriquez has been removed, tolerating some PO but small appetitie. Daughter at bedside    Objective:     Patient Vitals for the past 24 hrs:   BP Temp Temp src Pulse Resp SpO2   05/16/24 1138 108/62 -- -- (!) 107 -- 95 %   05/16/24 0809 123/72 98.6 °F (37 °C) Oral 89 14 96 %   05/16/24 0406 -- -- -- -- 16 --   05/16/24 0333 103/61 98.6 °F (37 °C) Oral 73 16 96 %   05/15/24 2304 104/63 97.9 °F (36.6 °C) Oral 81 16 96 %   05/15/24 2148 112/68 98.6 °F (37 °C) Oral 69 16 95 %   05/15/24 2125 114/69 98.6 °F (37 °C) Oral 68 18 94 %   05/15/24 2010 116/64 97.6 °F (36.4 °C) Oral 66 16 96 %   05/15/24 1925 (!) 125/58 -- -- 74 -- 97 %   05/15/24 1910 (!) 137/49 -- -- 77 -- 98 %   05/15/24 1845 (!) 141/65 97.3 °F (36.3 °C) Temporal 82 -- 100 %   05/15/24 1840 108/75 -- -- 82 -- 92 %   05/15/24 1835 (!) 95/53 -- -- 81 -- (!) 89 %   05/15/24 1825 (!) 155/91 97.2 °F (36.2 °C) Temporal 85 14 93 %       General: alert, appears stated age, cooperative, and mild distress   Wound: Wound clean and dry no evidence of infection., No Erythema, No Drainage, and Positive for Edema   Motion: Painful range of Motion in affected extremity   DVT Exam: No evidence of DVT seen on physical exam.  No cords or calf tenderness.  No significant calf/ankle edema.     Additional exam: Patient seen sitting up in bed at time of interview  Leg lengths right shortened, rotational alignment neutral  Thigh moderate swelling as expected, compartments compressible  Tender to palpation  EHL, FHL, gastroc, and anterior tibialis motor intact  Sensation intact to light touch  DP and PT pulses 2+      Data Review  CBC:   Lab Results   Component Value Date/Time    WBC 5.8 05/16/2024 05:49 AM    RBC 2.60 05/16/2024 05:49 AM    HGB 7.8 05/16/2024 05:49 AM    HCT 24.1 05/16/2024 05:49 AM

## 2024-05-17 LAB
ABO + RH BLD: NORMAL
ANION GAP SERPL CALCULATED.3IONS-SCNC: 11 MMOL/L (ref 3–16)
BASOPHILS # BLD: 0 K/UL (ref 0–0.2)
BASOPHILS NFR BLD: 0.2 %
BLD GP AB SCN SERPL QL: NORMAL
BLOOD BANK DISPENSE STATUS: NORMAL
BLOOD BANK PRODUCT CODE: NORMAL
BPU ID: NORMAL
BUN SERPL-MCNC: 25 MG/DL (ref 7–20)
CALCIUM SERPL-MCNC: 7.7 MG/DL (ref 8.3–10.6)
CHLORIDE SERPL-SCNC: 93 MMOL/L (ref 99–110)
CO2 SERPL-SCNC: 23 MMOL/L (ref 21–32)
CREAT SERPL-MCNC: 1.3 MG/DL (ref 0.6–1.2)
DEPRECATED RDW RBC AUTO: 15.4 % (ref 12.4–15.4)
DESCRIPTION BLOOD BANK: NORMAL
EOSINOPHIL # BLD: 0.2 K/UL (ref 0–0.6)
EOSINOPHIL NFR BLD: 2.2 %
GFR SERPLBLD CREATININE-BSD FMLA CKD-EPI: 40 ML/MIN/{1.73_M2}
GLUCOSE SERPL-MCNC: 173 MG/DL (ref 70–99)
HCT VFR BLD AUTO: 19.9 % (ref 36–48)
HCT VFR BLD AUTO: 21.7 % (ref 36–48)
HGB BLD-MCNC: 6.3 G/DL (ref 12–16)
HGB BLD-MCNC: 7.1 G/DL (ref 12–16)
LYMPHOCYTES # BLD: 0.5 K/UL (ref 1–5.1)
LYMPHOCYTES NFR BLD: 6.4 %
MCH RBC QN AUTO: 30.1 PG (ref 26–34)
MCHC RBC AUTO-ENTMCNC: 32.7 G/DL (ref 31–36)
MCV RBC AUTO: 92 FL (ref 80–100)
MONOCYTES # BLD: 0.8 K/UL (ref 0–1.3)
MONOCYTES NFR BLD: 10.2 %
NEUTROPHILS # BLD: 6.2 K/UL (ref 1.7–7.7)
NEUTROPHILS NFR BLD: 81 %
PLATELET # BLD AUTO: 142 K/UL (ref 135–450)
PMV BLD AUTO: 8.4 FL (ref 5–10.5)
POTASSIUM SERPL-SCNC: 4.4 MMOL/L (ref 3.5–5.1)
RBC # BLD AUTO: 2.35 M/UL (ref 4–5.2)
SODIUM SERPL-SCNC: 127 MMOL/L (ref 136–145)
WBC # BLD AUTO: 7.6 K/UL (ref 4–11)

## 2024-05-17 PROCEDURE — 85014 HEMATOCRIT: CPT

## 2024-05-17 PROCEDURE — 99024 POSTOP FOLLOW-UP VISIT: CPT | Performed by: SPECIALIST/TECHNOLOGIST

## 2024-05-17 PROCEDURE — 94761 N-INVAS EAR/PLS OXIMETRY MLT: CPT

## 2024-05-17 PROCEDURE — 36430 TRANSFUSION BLD/BLD COMPNT: CPT

## 2024-05-17 PROCEDURE — 85025 COMPLETE CBC W/AUTO DIFF WBC: CPT

## 2024-05-17 PROCEDURE — 86850 RBC ANTIBODY SCREEN: CPT

## 2024-05-17 PROCEDURE — 86923 COMPATIBILITY TEST ELECTRIC: CPT

## 2024-05-17 PROCEDURE — 85018 HEMOGLOBIN: CPT

## 2024-05-17 PROCEDURE — 6370000000 HC RX 637 (ALT 250 FOR IP): Performed by: ORTHOPAEDIC SURGERY

## 2024-05-17 PROCEDURE — 36415 COLL VENOUS BLD VENIPUNCTURE: CPT

## 2024-05-17 PROCEDURE — 80048 BASIC METABOLIC PNL TOTAL CA: CPT

## 2024-05-17 PROCEDURE — 97110 THERAPEUTIC EXERCISES: CPT

## 2024-05-17 PROCEDURE — P9016 RBC LEUKOCYTES REDUCED: HCPCS

## 2024-05-17 PROCEDURE — 2700000000 HC OXYGEN THERAPY PER DAY

## 2024-05-17 PROCEDURE — 6360000002 HC RX W HCPCS: Performed by: ORTHOPAEDIC SURGERY

## 2024-05-17 PROCEDURE — 6370000000 HC RX 637 (ALT 250 FOR IP): Performed by: SPECIALIST/TECHNOLOGIST

## 2024-05-17 PROCEDURE — 97535 SELF CARE MNGMENT TRAINING: CPT

## 2024-05-17 PROCEDURE — 97530 THERAPEUTIC ACTIVITIES: CPT

## 2024-05-17 PROCEDURE — 1200000000 HC SEMI PRIVATE

## 2024-05-17 PROCEDURE — 6360000002 HC RX W HCPCS: Performed by: SPECIALIST/TECHNOLOGIST

## 2024-05-17 PROCEDURE — 86900 BLOOD TYPING SEROLOGIC ABO: CPT

## 2024-05-17 PROCEDURE — 30233N1 TRANSFUSION OF NONAUTOLOGOUS RED BLOOD CELLS INTO PERIPHERAL VEIN, PERCUTANEOUS APPROACH: ICD-10-PCS | Performed by: ORTHOPAEDIC SURGERY

## 2024-05-17 PROCEDURE — APPNB45 APP NON BILLABLE 31-45 MINUTES: Performed by: SPECIALIST/TECHNOLOGIST

## 2024-05-17 PROCEDURE — 2580000003 HC RX 258: Performed by: ORTHOPAEDIC SURGERY

## 2024-05-17 PROCEDURE — 86901 BLOOD TYPING SEROLOGIC RH(D): CPT

## 2024-05-17 RX ORDER — OXYCODONE HYDROCHLORIDE 5 MG/1
5 TABLET ORAL EVERY 6 HOURS PRN
Qty: 28 TABLET | Refills: 0 | Status: SHIPPED | OUTPATIENT
Start: 2024-05-17 | End: 2024-05-17

## 2024-05-17 RX ORDER — POLYETHYLENE GLYCOL 3350 17 G/17G
17 POWDER, FOR SOLUTION ORAL DAILY
Qty: 527 G | Refills: 0 | DISCHARGE
Start: 2024-05-18 | End: 2024-06-17

## 2024-05-17 RX ORDER — ENOXAPARIN SODIUM 100 MG/ML
30 INJECTION SUBCUTANEOUS DAILY
DISCHARGE
Start: 2024-05-18 | End: 2024-06-29

## 2024-05-17 RX ORDER — SODIUM CHLORIDE 9 MG/ML
INJECTION, SOLUTION INTRAVENOUS PRN
Status: DISCONTINUED | OUTPATIENT
Start: 2024-05-17 | End: 2024-05-22 | Stop reason: HOSPADM

## 2024-05-17 RX ORDER — ACETAMINOPHEN 325 MG/1
650 TABLET ORAL EVERY 6 HOURS
Qty: 240 TABLET | Refills: 0 | DISCHARGE
Start: 2024-05-17 | End: 2024-06-16

## 2024-05-17 RX ORDER — ENOXAPARIN SODIUM 100 MG/ML
30 INJECTION SUBCUTANEOUS DAILY
Status: DISCONTINUED | OUTPATIENT
Start: 2024-05-17 | End: 2024-05-22 | Stop reason: HOSPADM

## 2024-05-17 RX ORDER — OXYCODONE HYDROCHLORIDE 5 MG/1
5 TABLET ORAL EVERY 6 HOURS PRN
Qty: 28 TABLET | Refills: 0 | Status: SHIPPED | OUTPATIENT
Start: 2024-05-17 | End: 2024-05-24

## 2024-05-17 RX ORDER — METHOCARBAMOL 500 MG/1
500 TABLET, FILM COATED ORAL 3 TIMES DAILY
Qty: 30 TABLET | Refills: 0 | DISCHARGE
Start: 2024-05-17 | End: 2024-05-27

## 2024-05-17 RX ADMIN — Medication 5 MG: at 19:54

## 2024-05-17 RX ADMIN — METHOCARBAMOL 500 MG: 500 TABLET ORAL at 09:54

## 2024-05-17 RX ADMIN — ACETAMINOPHEN 650 MG: 325 TABLET ORAL at 14:32

## 2024-05-17 RX ADMIN — METHOCARBAMOL 500 MG: 500 TABLET ORAL at 19:54

## 2024-05-17 RX ADMIN — ACETAMINOPHEN 650 MG: 325 TABLET ORAL at 09:54

## 2024-05-17 RX ADMIN — ENOXAPARIN SODIUM 30 MG: 100 INJECTION SUBCUTANEOUS at 09:54

## 2024-05-17 RX ADMIN — ONDANSETRON 4 MG: 2 INJECTION INTRAMUSCULAR; INTRAVENOUS at 18:03

## 2024-05-17 RX ADMIN — SERTRALINE HYDROCHLORIDE 75 MG: 50 TABLET ORAL at 17:33

## 2024-05-17 RX ADMIN — OXYCODONE HYDROCHLORIDE 5 MG: 5 TABLET ORAL at 01:09

## 2024-05-17 RX ADMIN — METHOCARBAMOL 500 MG: 500 TABLET ORAL at 14:32

## 2024-05-17 RX ADMIN — ACETAMINOPHEN 650 MG: 325 TABLET ORAL at 19:54

## 2024-05-17 RX ADMIN — POLYETHYLENE GLYCOL 3350 17 G: 17 POWDER, FOR SOLUTION ORAL at 09:55

## 2024-05-17 RX ADMIN — SODIUM CHLORIDE: 4.5 INJECTION, SOLUTION INTRAVENOUS at 13:29

## 2024-05-17 RX ADMIN — ALPRAZOLAM 0.25 MG: 0.25 TABLET ORAL at 19:54

## 2024-05-17 RX ADMIN — DONEPEZIL HYDROCHLORIDE 10 MG: 5 TABLET, FILM COATED ORAL at 19:54

## 2024-05-17 ASSESSMENT — PAIN SCALES - WONG BAKER
WONGBAKER_NUMERICALRESPONSE: HURTS A LITTLE BIT
WONGBAKER_NUMERICALRESPONSE: HURTS A LITTLE BIT
WONGBAKER_NUMERICALRESPONSE: HURTS LITTLE MORE
WONGBAKER_NUMERICALRESPONSE: HURTS A LITTLE BIT
WONGBAKER_NUMERICALRESPONSE: HURTS A LITTLE BIT
WONGBAKER_NUMERICALRESPONSE: NO HURT

## 2024-05-17 ASSESSMENT — PAIN SCALES - PAIN ASSESSMENT IN ADVANCED DEMENTIA (PAINAD)
FACIALEXPRESSION: SAD, FRIGHTENED, FROWN
BODYLANGUAGE: RELAXED
BREATHING: NORMAL
CONSOLABILITY: DISTRACTED OR REASSURED BY VOICE/TOUCH
TOTALSCORE: 3
NEGVOCALIZATION: OCCASIONAL MOAN/GROAN, LOW SPEECH, NEGATIVE/DISAPPROVING QUALITY

## 2024-05-17 ASSESSMENT — PAIN SCALES - GENERAL
PAINLEVEL_OUTOF10: 0
PAINLEVEL_OUTOF10: 4
PAINLEVEL_OUTOF10: 0

## 2024-05-17 ASSESSMENT — PAIN DESCRIPTION - PAIN TYPE: TYPE: SURGICAL PAIN

## 2024-05-17 ASSESSMENT — PAIN DESCRIPTION - FREQUENCY: FREQUENCY: CONTINUOUS

## 2024-05-17 ASSESSMENT — PAIN DESCRIPTION - ORIENTATION
ORIENTATION: LEFT

## 2024-05-17 ASSESSMENT — PAIN DESCRIPTION - DIRECTION: RADIATING_TOWARDS: LEG

## 2024-05-17 ASSESSMENT — PAIN DESCRIPTION - LOCATION
LOCATION: HIP

## 2024-05-17 ASSESSMENT — PAIN DESCRIPTION - DESCRIPTORS: DESCRIPTORS: PATIENT UNABLE TO DESCRIBE

## 2024-05-17 NOTE — PROGRESS NOTES
[facility] Progress Note      Name:  Susan Singh /Age/Sex: 1935  (89 y.o. female)   MRN & CSN:  [id number] & [id number] Encounter Date/Time: 2024 10:25 AM EDT   Location:  [facility]- PCP: No primary care provider on file.     Attending:Warren Diaz MD         Summary     Hospital Day: 4    Ms. Susan Singh is a 89 y.o. female who was admitted on 2024 with a chief complaint of     Chief Complaint   Patient presents with    Fall     Right hip pain, t-pod placed, pt was confused at scene, does not remember falling, lives at home alone         Assessment and Recommendations     Right hip fracture: Secondary to mechanical fall and presence of osteoporosis. 2 Days Post-Op left hemiarthroplasty without complications.  Continue post-op mgt/rehab plan per ortho.  PO/IV Narcotic analgesia as ordered.      Postoperative versus acute blood loss anemia: Hemoglobin noted to be trending down, 7.1 today, was 7.8 yesterday.  Recheck H&H.  Transfuse to keep Hb >7    Hyperkalemia: K5.3 yesterday, 4.4 today, follow    CKD 2: SCR at baseline, follow    Hypertension: Well-controlled, continue lisinopril, monitor    Dementia without behavioral disturbance: At baseline, continue Aricept + Zoloft.  Continue supportive care and redirection      Diet ADULT DIET; Regular   DVT Prophylaxis [] Lovenox, []  Heparin, [] SCDs, [] Ambulation,  [] Eliquis, [] Xarelto  [] Coumadin   Code Status Full Code   Disposition From: Home  Expected Disposition: SNF  Estimated Date of Discharge: 24 hours  Patient requires continued admission due to pending clinical improvement, anemia   Surrogate Decision Maker/ POA    Primary Decision Maker: Arely Dickerson - Child - [phone]    Primary Decision Maker: mary patrick - Child - [phone]      Medical Decision Making:  The following items were considered in medical decision making:  Discussion of patient care with other providers  Reviewed clinical lab tests  Reviewed  36 hours 10/01/2021 04:38 PM     Blood Cultures:   Lab Results   Component Value Date/Time    BC No Growth after 4 days of incubation. 01/28/2024 06:01 PM     Lab Results   Component Value Date/Time    BLOODCULT2 No Growth after 4 days of incubation. 01/29/2024 12:59 AM     Organism:   Lab Results   Component Value Date/Time    ORG Enterococcus faecalis 07/17/2018 10:36 AM       Imaging    All imaging has been personally reviewed by me.   XR HIP 2-3 VW W PELVIS RIGHT    Result Date: 5/15/2024  EXAMINATION: ONE XRAY VIEW OF THE PELVIS AND TWO XRAY VIEWS RIGHT HIP 5/15/2024 7:09 pm COMPARISON: Yesterday HISTORY: ORDERING SYSTEM PROVIDED HISTORY: Right hip hemiarthroplasty TECHNOLOGIST PROVIDED HISTORY: Of operative side while in recovery room Reason for exam:->Right hip hemiarthroplasty Reason for Exam: post op right hip FINDINGS: Total hip arthroplasty is in place.  The acetabular component appears to have a horizontal lie but this may be related to the positioning of the patient. Gas and edema in the subcutaneous tissues.  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  No acute fracture or dislocation.  Additionally, the femoral stem is mildly medially placed.     Total hip arthroplasty        Electronically signed by Warren Diaz MD on 5/17/2024 at 10:25 AM

## 2024-05-17 NOTE — DISCHARGE INSTR - COC
Resp 16   Ht 1.524 m (5')   Wt 55.6 kg (122 lb 9.2 oz)   SpO2 99%   BMI 23.94 kg/m²     Last documented pain score (0-10 scale): Pain Level:  (pt is sleeping)  Last Weight:   Wt Readings from Last 1 Encounters:   05/15/24 55.6 kg (122 lb 9.2 oz)     Mental Status:  oriented, alert, and coherent    IV Access:  - None    Nursing Mobility/ADLs:  Walking   Dependent  Transfer  Dependent  Bathing  Dependent  Dressing  Dependent  Toileting  Dependent  Feeding  Independent  Med Admin  Assisted  Med Delivery   whole    Wound Care Documentation and Therapy:  Wound 05/14/24 Coccyx stage II to coccyx (Active)   Dressing Status New dressing applied 05/15/24 2128   Wound Cleansed Cleansed with saline 05/15/24 2128   Dressing/Treatment Foam 05/15/24 2128   Drainage Amount None (dry) 05/15/24 2128   Number of days: 2       Incision 05/15/24 Hip Proximal;Right (Active)   Dressing Status Clean;Dry;Intact 05/16/24 0847   Dressing/Treatment Dry dressing;Foam 05/16/24 0847   Drainage Amount None (dry) 05/16/24 0847   Number of days: 2        Elimination:  Continence:   Bowel: Yes  Bladder: Yes  Urinary Catheter: Insertion Date: 05/21/2024 and Indication for Use of Catheter: Acute urinary retention/obstruction   Colostomy/Ileostomy/Ileal Conduit: No       Date of Last BM: 05/21/2024      Intake/Output Summary (Last 24 hours) at 5/17/2024 0911  Last data filed at 5/16/2024 1524  Gross per 24 hour   Intake 1889 ml   Output 475 ml   Net 1414 ml     I/O last 3 completed shifts:  In: 1889 [P.O.:360; I.V.:1529]  Out: 475 [Urine:475]    Safety Concerns:     History of Falls (last 30 days) and At Risk for Falls    Impairments/Disabilities:      None    Nutrition Therapy:  Current Nutrition Therapy:   - Oral Diet:  General    Routes of Feeding: Oral  Liquids: Thin Liquids  Daily Fluid Restriction: no  Last Modified Barium Swallow with Video (Video Swallowing Test): not done    Treatments at the Time of Hospital Discharge:   Respiratory  above information and transfer of Susan Singh  is necessary for the continuing treatment of the diagnosis listed and that she requires Skilled Nursing Facility for less 30 days.     Update Admission H&P: No change in H&P    PHYSICIAN SIGNATURE:  Electronically signed by Chris Vines DO on 5/22/24 at 1:25 PM EDT

## 2024-05-17 NOTE — PROGRESS NOTES
Occupational Therapy  Facility/Department: [facility] C5 - MED SURG/ORTHO  Daily Treatment Note  NAME: Susan Singh  : 1935  MRN: [id number]  Date of Service: 2024    Discharge Recommendations:  Subacute/Skilled Nursing Facility  OT Equipment Recommendations  Equipment Needed: No    AM-PAC score  AM-PAC Inpatient Daily Activity Raw Score: 7 (24 1205)  AM-PAC Inpatient ADL T-Scale Score : 20.13 (24 1205)  ADL Inpatient CMS 0-100% Score: 92.44 (24 1205)  ADL Inpatient CMS G-Code Modifier : CM (24 120)    Therapy discharge recommendations take into account each patient's current medical complexities and are subject to input/oversight from the patient's healthcare team.   Barriers to Home Discharge:   [x] Steps to access home entry or bed/bath:   [x] Unable to transfer, ambulate, or propel wheelchair household distances without assist   [x] Limited available assist at home upon discharge    [x] Patient or family requests d/c to post-acute facility    [x] Poor cognition/safety awareness for d/c to home alone    []Unable to maintain ordered weight bearing status    [] Patient with salient signs of long-standing immobility   [x] Patient is at risk for falls    [x] Other: Decreased safety and independence w/ ADLs.     If pt is unable to be seen after this session, please let this note serve as discharge summary.  Please see case management note for discharge disposition.  Thank you.    Patient Diagnosis(es): The primary encounter diagnosis was Closed displaced fracture of right femoral neck (HCC). A diagnosis of Fall, initial encounter was also pertinent to this visit.     Assessment    Assessment: Pt received for OT session resting in bed with adult daughter at bedside to address current functional deficits that inhibit independence and safety with ADLs and functional mobility. Pt agreeable to session, does not report pain at rest but grimacing in pain while sitting EOB. During session,

## 2024-05-17 NOTE — PROGRESS NOTES
Department of Orthopedic Surgery  Physician Assistant   Progress Note    Subjective:     Systemic or Specific Complaints: no complaints, resting comfortably in bed. Did not work with therapy yet today. Pt's daughter at bedside    Objective:     Patient Vitals for the past 24 hrs:   BP Temp Temp src Pulse Resp SpO2   05/17/24 0845 (!) 81/51 -- -- 79 -- 99 %   05/17/24 0208 -- -- -- -- 16 --   05/17/24 0109 -- -- -- -- 14 --   05/16/24 2026 98/60 100 °F (37.8 °C) Oral 90 14 98 %   05/16/24 1138 108/62 -- -- (!) 107 -- 95 %       General: alert, appears stated age, cooperative, and mild distress   Wound: Wound clean and dry no evidence of infection., No Erythema, No Drainage, and Positive for Edema   Motion: Painful range of Motion in affected extremity   DVT Exam: No evidence of DVT seen on physical exam.  No cords or calf tenderness.  No significant calf/ankle edema.     Additional exam: Patient seen laying in bed at time of interview  Leg lengths right minimally shortened, rotational alignment neutral  Thigh moderate swelling as expected, compartments compressible  Tender to palpation, pt grimaces and awakens to pain  EHL, FHL, gastroc, and anterior tibialis motor intact  Sensation intact to light touch  DP and PT pulses 2+      Data Review  CBC:   Lab Results   Component Value Date/Time    WBC 5.8 05/16/2024 05:49 AM    RBC 2.60 05/16/2024 05:49 AM    HGB 7.8 05/16/2024 05:49 AM    HCT 24.1 05/16/2024 05:49 AM     05/16/2024 05:49 AM       Renal:   Lab Results   Component Value Date/Time     05/16/2024 05:49 AM    K 5.3 05/16/2024 05:49 AM    K 4.9 04/16/2024 05:39 PM     05/16/2024 05:49 AM    CO2 25 05/16/2024 05:49 AM    BUN 27 05/16/2024 05:49 AM    CREATININE 1.3 05/16/2024 05:49 AM    GLUCOSE 119 05/16/2024 05:49 AM    CALCIUM 8.2 05/16/2024 05:49 AM          Assessment:      right hip hemiarthroplasty, POD 2.  DOS 5/15/24 by Dr Mosley     Plan:      1:  WBAT to the RLE with assistive

## 2024-05-17 NOTE — PROGRESS NOTES
Physical Therapy  Facility/Department: [facility] C5 - MED SURG/ORTHO  Daily Treatment Note  NAME: Susan Singh  : 1935  MRN: [id number]    Date of Service: 2024    Discharge Recommendations:  Subacute/Skilled Nursing Facility      Therapy discharge recommendations are subject to collaboration from the patient’s interdisciplinary healthcare team, including MD and case management recommendations.     Barriers to Home Discharge:   [x] Steps to access home entry or bed/bath:   [x] Unable to transfer, ambulate, or propel wheelchair household distances without assist   [x] Limited available assist at home upon discharge    [] Patient or family requests d/c to post-acute facility    [x] Poor cognition/safety awareness for d/c to home alone    [] Unable to maintain ordered weight bearing status    [x] Patient with salient signs of long-standing immobility   [x] Decreased independence with ADLs   [x] Increased risk for falls   [] Other:    Patient Diagnosis(es): The primary encounter diagnosis was Closed displaced fracture of right femoral neck (HCC). A diagnosis of Fall, initial encounter was also pertinent to this visit.    Assessment   Assessment: Pt demos max A x 2 for bed mobility and sitting EOB min/mod A to brief moments of just cga. Continues limited by pain and decreased alertness. Pt would benefit from continued skilled PT to address current deficits. Recommend SNF upon d/c  Activity Tolerance: Treatment limited secondary to decreased cognition;Patient limited by fatigue;Patient limited by endurance . Co-tx collaboration this date with OT staff to safely progress pt toward goals. Pt will have better performance outcomes within a co-treatment than 1:1 session.    Plan    Physical Therapy Plan  General Plan: 1 time a day 7 days a week  Current Treatment Recommendations: Strengthening;Balance training;Functional mobility training;ROM;Transfer training;Endurance training;Pain management;Gait training;Home  BLE exercises by 5/19/24  -5/17 on-going  Patient Goals   Patient Goals : none stated by patient    Education  Patient Education  Education Given To: Patient;Family  Education Provided: Role of Therapy;Plan of Care;Family Education  Education Method: Verbal  Barriers to Learning: Cognition  Education Outcome: Continued education needed;Unable to demonstrate understanding    AM-PAC - Mobility    AM-PAC Basic Mobility - Inpatient   How much help is needed turning from your back to your side while in a flat bed without using bedrails?: A Lot  How much help is needed moving from lying on your back to sitting on the side of a flat bed without using bedrails?: A Lot  How much help is needed moving to and from a bed to a chair?: Total  How much help is needed standing up from a chair using your arms?: Total  How much help is needed walking in hospital room?: Total  How much help is needed climbing 3-5 steps with a railing?: Total  AM-PAC Inpatient Mobility Raw Score : 8  AM-PAC Inpatient T-Scale Score : 28.52  Mobility Inpatient CMS 0-100% Score: 86.62  Mobility Inpatient CMS G-Code Modifier : CM         Therapy Time   Individual Concurrent Group Co-treatment   Time In 0841         Time Out 0919         Minutes 38         Timed Code Treatment Minutes: 38 Minutes       Simeon العراقي

## 2024-05-17 NOTE — DISCHARGE INSTRUCTIONS
Keep dressing clean and dry.  Change Mepilex every 3-5 days or as needed for excess drainage.  Please call physician if increased redness around incision, increased drainage, fevers, or pain becomes significantly worse.  Do not immerse in water such as baths but okay to shower with dressing on to protect wound.    F/U with Dr Mosley in 2-3 weeks.  Call [facility] for appointment time and date for follow up at [phone].        Weight Bearing on Surgical Side: full    Continue DVT Prophylaxis:  Lovenox 30mg daily for 6 weeks post op    Thigh high compression stockings to be worn on both legs for 30 days post-op. Put the compression stockings on in the morning, take off before you go to bed    Pain Medications  You were given oxycodone (Oxycontin, Oxyir)  Wean off pain medications as you deem appropriate as long as pain is under control  Be sure to drink plenty of fluids (recommend water) while taking narcotic pain medications to prevent constipation   You may take an over the counter laxative or stool softener as needed to prevent/treat constipation as well, we recommend miralax/glycolax.  We recommend that you consider taking these medications the entire time you are taking pain medication.  Cold packs/Ice packs/Machine  May be used as much as necessary to control swelling/inflammation/soreness  Be sure to have a barrier (cloth, clothing, towel) between the site and the ice pack to prevent frostbite  Contact [facility] office [phone] if  Increased redness, swelling, drainage of any kind, and/or pain to surgery site.  As well as new onset fevers and or chills.  These could signify an infection.  Calf or thigh tenderness to touch as well as increased swelling or redness.  This could signify a clot formation.  Numbness or tingling to an area around the incision site or below the incision site (toes).  Any rash appears, increased  or new onset nausea/vomiting occur.  This may

## 2024-05-17 NOTE — CARE COORDINATION
Writer spoke with Edilma @ [employer], she has started cert this day.     Cert is approved can DC when medically ready.    Bernarda Alvarado RN

## 2024-05-17 NOTE — PLAN OF CARE
Problem: Safety - Adult  Goal: Free from fall injury  5/16/2024 2147 by Alejo Milligan, RN  Outcome: Progressing  5/16/2024 1515 by Prudence Nicholson, RN  Outcome: Progressing     Problem: Discharge Planning  Goal: Discharge to home or other facility with appropriate resources  Outcome: Progressing     Problem: Pain  Goal: Verbalizes/displays adequate comfort level or baseline comfort level  Outcome: Progressing     Problem: Skin/Tissue Integrity  Goal: Absence of new skin breakdown  Description: 1.  Monitor for areas of redness and/or skin breakdown  2.  Assess vascular access sites hourly  3.  Every 4-6 hours minimum:  Change oxygen saturation probe site  4.  Every 4-6 hours:  If on nasal continuous positive airway pressure, respiratory therapy assess nares and determine need for appliance change or resting period.  Outcome: Progressing     Problem: ABCDS Injury Assessment  Goal: Absence of physical injury  Outcome: Progressing

## 2024-05-18 LAB
ANION GAP SERPL CALCULATED.3IONS-SCNC: 10 MMOL/L (ref 3–16)
BASOPHILS # BLD: 0 K/UL (ref 0–0.2)
BASOPHILS NFR BLD: 0.3 %
BUN SERPL-MCNC: 26 MG/DL (ref 7–20)
CA-I BLD-SCNC: 1.04 MMOL/L (ref 1.12–1.32)
CALCIUM SERPL-MCNC: 7.7 MG/DL (ref 8.3–10.6)
CHLORIDE SERPL-SCNC: 96 MMOL/L (ref 99–110)
CO2 SERPL-SCNC: 21 MMOL/L (ref 21–32)
CREAT SERPL-MCNC: 1.1 MG/DL (ref 0.6–1.2)
DEPRECATED RDW RBC AUTO: 15 % (ref 12.4–15.4)
EOSINOPHIL # BLD: 0.4 K/UL (ref 0–0.6)
EOSINOPHIL NFR BLD: 4.9 %
GFR SERPLBLD CREATININE-BSD FMLA CKD-EPI: 49 ML/MIN/{1.73_M2}
GLUCOSE SERPL-MCNC: 106 MG/DL (ref 70–99)
HCT VFR BLD AUTO: 23.6 % (ref 36–48)
HCT VFR BLD AUTO: 27.8 % (ref 36–48)
HCT VFR BLD AUTO: 27.8 % (ref 36–48)
HGB BLD-MCNC: 7.9 G/DL (ref 12–16)
HGB BLD-MCNC: 9 G/DL (ref 12–16)
HGB BLD-MCNC: 9.2 G/DL (ref 12–16)
LYMPHOCYTES # BLD: 1.1 K/UL (ref 1–5.1)
LYMPHOCYTES NFR BLD: 14.7 %
MCH RBC QN AUTO: 30.4 PG (ref 26–34)
MCHC RBC AUTO-ENTMCNC: 33.4 G/DL (ref 31–36)
MCV RBC AUTO: 91.1 FL (ref 80–100)
MONOCYTES # BLD: 0.7 K/UL (ref 0–1.3)
MONOCYTES NFR BLD: 10.4 %
NEUTROPHILS # BLD: 5 K/UL (ref 1.7–7.7)
NEUTROPHILS NFR BLD: 69.7 %
PH BLDV: 7.42 [PH] (ref 7.35–7.45)
PLATELET # BLD AUTO: 128 K/UL (ref 135–450)
PMV BLD AUTO: 8.7 FL (ref 5–10.5)
POTASSIUM SERPL-SCNC: 4.7 MMOL/L (ref 3.5–5.1)
RBC # BLD AUTO: 2.59 M/UL (ref 4–5.2)
SODIUM SERPL-SCNC: 127 MMOL/L (ref 136–145)
WBC # BLD AUTO: 7.2 K/UL (ref 4–11)

## 2024-05-18 PROCEDURE — 1200000000 HC SEMI PRIVATE

## 2024-05-18 PROCEDURE — 85014 HEMATOCRIT: CPT

## 2024-05-18 PROCEDURE — 97530 THERAPEUTIC ACTIVITIES: CPT

## 2024-05-18 PROCEDURE — 36415 COLL VENOUS BLD VENIPUNCTURE: CPT

## 2024-05-18 PROCEDURE — 36430 TRANSFUSION BLD/BLD COMPNT: CPT

## 2024-05-18 PROCEDURE — 85018 HEMOGLOBIN: CPT

## 2024-05-18 PROCEDURE — 85025 COMPLETE CBC W/AUTO DIFF WBC: CPT

## 2024-05-18 PROCEDURE — 2580000003 HC RX 258: Performed by: ORTHOPAEDIC SURGERY

## 2024-05-18 PROCEDURE — 2700000000 HC OXYGEN THERAPY PER DAY

## 2024-05-18 PROCEDURE — 6370000000 HC RX 637 (ALT 250 FOR IP): Performed by: ORTHOPAEDIC SURGERY

## 2024-05-18 PROCEDURE — 82330 ASSAY OF CALCIUM: CPT

## 2024-05-18 PROCEDURE — 97535 SELF CARE MNGMENT TRAINING: CPT

## 2024-05-18 PROCEDURE — 80048 BASIC METABOLIC PNL TOTAL CA: CPT

## 2024-05-18 PROCEDURE — 92523 SPEECH SOUND LANG COMPREHEN: CPT

## 2024-05-18 PROCEDURE — 94761 N-INVAS EAR/PLS OXIMETRY MLT: CPT

## 2024-05-18 PROCEDURE — 6370000000 HC RX 637 (ALT 250 FOR IP): Performed by: SPECIALIST/TECHNOLOGIST

## 2024-05-18 RX ORDER — CALCIUM CARBONATE 500 MG/1
500 TABLET, CHEWABLE ORAL 3 TIMES DAILY PRN
Status: DISCONTINUED | OUTPATIENT
Start: 2024-05-18 | End: 2024-05-22 | Stop reason: HOSPADM

## 2024-05-18 RX ADMIN — ACETAMINOPHEN 650 MG: 325 TABLET ORAL at 14:10

## 2024-05-18 RX ADMIN — DONEPEZIL HYDROCHLORIDE 10 MG: 5 TABLET, FILM COATED ORAL at 20:45

## 2024-05-18 RX ADMIN — LISINOPRIL 10 MG: 10 TABLET ORAL at 09:02

## 2024-05-18 RX ADMIN — SODIUM CHLORIDE: 4.5 INJECTION, SOLUTION INTRAVENOUS at 04:24

## 2024-05-18 RX ADMIN — ACETAMINOPHEN 650 MG: 325 TABLET ORAL at 09:02

## 2024-05-18 RX ADMIN — ACETAMINOPHEN 650 MG: 325 TABLET ORAL at 20:46

## 2024-05-18 RX ADMIN — SERTRALINE HYDROCHLORIDE 75 MG: 50 TABLET ORAL at 16:57

## 2024-05-18 RX ADMIN — METHOCARBAMOL 500 MG: 500 TABLET ORAL at 20:47

## 2024-05-18 RX ADMIN — Medication 10 ML: at 19:58

## 2024-05-18 RX ADMIN — METHOCARBAMOL 500 MG: 500 TABLET ORAL at 09:02

## 2024-05-18 RX ADMIN — OXYCODONE HYDROCHLORIDE 5 MG: 5 TABLET ORAL at 19:22

## 2024-05-18 RX ADMIN — METHOCARBAMOL 500 MG: 500 TABLET ORAL at 14:11

## 2024-05-18 RX ADMIN — POLYETHYLENE GLYCOL 3350 17 G: 17 POWDER, FOR SOLUTION ORAL at 09:02

## 2024-05-18 RX ADMIN — Medication 5 MG: at 20:47

## 2024-05-18 ASSESSMENT — PAIN DESCRIPTION - DESCRIPTORS
DESCRIPTORS: ACHING
DESCRIPTORS: ACHING

## 2024-05-18 ASSESSMENT — PAIN SCALES - GENERAL
PAINLEVEL_OUTOF10: 3
PAINLEVEL_OUTOF10: 6
PAINLEVEL_OUTOF10: 1
PAINLEVEL_OUTOF10: 1

## 2024-05-18 ASSESSMENT — PAIN DESCRIPTION - ORIENTATION
ORIENTATION: RIGHT
ORIENTATION: LEFT
ORIENTATION: RIGHT

## 2024-05-18 ASSESSMENT — PAIN DESCRIPTION - LOCATION
LOCATION: HIP

## 2024-05-18 NOTE — PROGRESS NOTES
Occupational Therapy  Facility/Department: [facility] C5 - MED SURG/ORTHO  Daily Treatment Note  NAME: Susan Singh  : 1935  MRN: [id number]    Date of Service: 2024    Discharge Recommendations:  Subacute/Skilled Nursing Facility  OT Equipment Recommendations  Equipment Needed: No  Other: defer    Therapy discharge recommendations are subject to collaboration from the patient’s interdisciplinary healthcare team, including MD and case management recommendations.     Barriers to Home Discharge:   [x] Steps to access home entry or bed/bath:   [x] Unable to transfer, ambulate, or propel wheelchair household distances without assist   [x] Limited available assist at home upon discharge    [] Patient or family requests d/c to post-acute facility    [x] Poor cognition/safety awareness for d/c to home alone    [] Unable to maintain ordered weight bearing status    [x] Patient with salient signs of long-standing immobility   [x] Decreased independence with ADLs   [x] Increased risk for falls   [] Other:     If pt is unable to be seen after this session, please let this note serve as discharge summary.  Please see case management note for discharge disposition.  Thank you.    Patient Diagnosis(es): The primary encounter diagnosis was Closed displaced fracture of right femoral neck (HCC). A diagnosis of Fall, initial encounter was also pertinent to this visit.     Assessment    Assessment: Pt supine in bed at start of session. Pt tolerates OT session fair, minimally verbal throughout session and poor participation. Pt completes bed mobility with total assist x2. Once seated EOB pt requires total assist for sitting balance but progresses to Tamir with cues for BUE placement and weight shifting. Pt requires maxAx2 to stand from bed and toilet, total assist for mobility via SaraStedy. Pt is dependent for LB dressing and toileting needs. Pt is limited by cognition, pain, endurance, activity tolerance - OT recommends SNF

## 2024-05-18 NOTE — PLAN OF CARE
SLP Evaluation Completed. All note are found in EMR    Keyana Guerra MA, CCC-SLP  Speech Pathologist  [phone]

## 2024-05-18 NOTE — PLAN OF CARE
Pt resting in bed quietly. Bed in lowest position, wheels locked, side rails up X2, non skid socks on. Bed check alarm engaged. Pt instructed not to get out of bed on own, to use call light for staff assistance when ambulating or other needs. Pt verbalizes understanding. Call light within reach. Will continue to monitor.     Problem: ABCDS Injury Assessment  Goal: Absence of physical injury  5/18/2024 1313 by Debbie Zimmerman, RN  Outcome: Progressing    Problem: Safety - Adult  Goal: Free from fall injury  5/18/2024 1313 by Debbie Zimmerman, RN  Outcome: Progressing       Pt rates pain level using 0-10 pain scale. Pain meds administered as ordered per MAR. Pt instructed to use call light for increasing pain or ineffective pain management. Pt verbalizes understanding. Call light within reach. Will continue to monitor.     Problem: Pain  Goal: Verbalizes/displays adequate comfort level or baseline comfort level  5/18/2024 1313 by Debbie Zimmerman, RN  Outcome: Progressing

## 2024-05-18 NOTE — PROGRESS NOTES
[facility] Orthopedic Surgery  Progress Note        POD # 3, s/p right hip hemiarthroplasty.    Pt comfortable, no c/o.  Drsg right hip D/C/I,  Mild pain with right hip ROM, NVI    CBC:   Lab Results   Component Value Date/Time    WBC 7.2 05/18/2024 04:18 AM    RBC 2.59 05/18/2024 04:18 AM    HGB 7.9 05/18/2024 04:18 AM    HCT 23.6 05/18/2024 04:18 AM    MCV 91.1 05/18/2024 04:18 AM    MCH 30.4 05/18/2024 04:18 AM    MCHC 33.4 05/18/2024 04:18 AM    RDW 15.0 05/18/2024 04:18 AM     05/18/2024 04:18 AM    MPV 8.7 05/18/2024 04:18 AM     PT/INR:    Lab Results   Component Value Date/Time    PROTIME 13.6 05/14/2024 09:31 PM    INR 1.02 05/14/2024 09:31 PM     PTT:    Lab Results   Component Value Date/Time    APTT 26.3 05/14/2024 09:31 PM   [APTT    A/P: s/p right hip hemiarthroplasty.  - Stable  - PT/OT, WBAT  - Ok to D/C to ECF from ortho standpoint.  - F/U Dr Mosley in 2 weeks.      Heidi Mosley MD 5/18/2024 1:39 PM

## 2024-05-18 NOTE — PROGRESS NOTES
[facility] Progress Note      Name:  Susan Singh /Age/Sex: 1935  (89 y.o. female)   MRN & CSN:  [id number] & [id number] Encounter Date/Time: 2024 10:25 AM EDT   Location:  [facility]- PCP: No primary care provider on file.     Attending:Warren Diaz MD         Summary     Hospital Day: 5    Ms. Susan Singh is a 89 y.o. female who was admitted on 2024 with a chief complaint of     Chief Complaint   Patient presents with    Fall     Right hip pain, t-pod placed, pt was confused at scene, does not remember falling, lives at home alone         Assessment and Recommendations     Right hip fracture: Secondary to mechanical fall and presence of osteoporosis. 3 Days Post-Op left hemiarthroplasty without complications.  Continue post-op mgt/rehab plan per ortho.  PO/IV Narcotic analgesia as ordered.      Postoperative versus acute blood loss anemia: Hemoglobin was 6.3 last night and got 1x PRBC with improvement to 7.9.  Check H&H every 6 hours for 3 episodes.  Transfuse to keep Hb >7. Check FOBT.      Electrolyte abnormalities: Hypokalemia has resolved; ionized calcium low, Tums initiated.  Hyponatremia with sodium 127.  Hypertonic saline infusion stopped.  Encourage oral intake.  Follow BMP.    CKD 2: SCR at baseline, follow    Hypertension: Well-controlled, continue lisinopril, monitor    Dementia without behavioral disturbance: At baseline, continue Aricept + Zoloft.  Continue supportive care and redirection      Diet ADULT DIET; Regular   DVT Prophylaxis [] Lovenox, []  Heparin, [] SCDs, [] Ambulation,  [] Eliquis, [] Xarelto  [] Coumadin   Code Status Full Code   Disposition From: Home  Expected Disposition: SNF  Estimated Date of Discharge: 24 hours  Patient requires continued admission due to pending clinical improvement, anemia   Surrogate Decision Maker/ POA    Primary Decision Maker: Arely Dickerson - Child - [phone]    Primary Decision Maker: mary patrick - Child -  day    sertraline  75 mg Oral QPM    donepezil  10 mg Oral Nightly    lisinopril  10 mg Oral Daily    melatonin  5 mg Oral Nightly      Infusions:    sodium chloride      sodium chloride 75 mL/hr at 05/18/24 0424    sodium chloride      sodium chloride       PRN Meds: sodium chloride, , PRN  sodium chloride flush, 5-40 mL, PRN  sodium chloride, , PRN  HYDROmorphone, 0.25 mg, Q3H PRN  ALPRAZolam, 0.25 mg, TID PRN  sodium chloride, , PRN  ondansetron, 4 mg, Q8H PRN   Or  ondansetron, 4 mg, Q6H PRN  acetaminophen, 650 mg, Q6H PRN  oxyCODONE, 5 mg, Q4H PRN   Or  oxyCODONE, 10 mg, Q4H PRN        Labs    CBC:   Recent Labs     05/16/24  0549 05/17/24  1110 05/17/24  2141 05/18/24  0418   WBC 5.8 7.6  --  7.2   HGB 7.8* 7.1* 6.3* 7.9*    142  --  128*       BMP:    Recent Labs     05/16/24  0549 05/17/24  1110 05/18/24  0418   * 127* 127*   K 5.3* 4.4 4.7    93* 96*   CO2 25 23 21   BUN 27* 25* 26*   CREATININE 1.3* 1.3* 1.1   GLUCOSE 119* 173* 106*       Hepatic:   No results for input(s): \"AST\", \"ALT\", \"BILITOT\", \"ALKPHOS\" in the last 72 hours.    Invalid input(s): \"ALB\"    Lipids:   Lab Results   Component Value Date/Time    CHOL 125 01/25/2023 05:13 AM    HDL 49 01/25/2023 05:13 AM    TRIG 102 01/25/2023 05:13 AM     Hemoglobin A1C:   Lab Results   Component Value Date/Time    LABA1C 5.9 07/21/2021 11:24 AM     TSH:   Lab Results   Component Value Date/Time    TSH 2.42 06/17/2022 02:20 PM     Troponin: No results found for: \"TROPONINT\"  Lactic Acid: No results for input(s): \"LACTA\" in the last 72 hours.  BNP:   No results for input(s): \"PROBNP\" in the last 72 hours.    UA:  Lab Results   Component Value Date/Time    NITRU Negative 05/14/2024 06:02 PM    COLORU Yellow 05/14/2024 06:02 PM    PHUR 6.0 05/14/2024 06:02 PM    PHUR 7.0 01/28/2024 08:28 PM    PHUR 7.0 01/28/2024 08:28 PM    WBCUA 0-2 10/17/2023 09:57 PM    RBCUA 0-2 10/17/2023 09:57 PM    MUCUS 1+ 02/18/2022 10:12 AM    BACTERIA Rare

## 2024-05-18 NOTE — CONSENT
Informed Consent for Blood Component Transfusion Note    I have discussed with the daughter the rationale for blood component transfusion; its benefits in treating or preventing fatigue, organ damage, or death; and its risk which includes mild transfusion reactions, rare risk of blood borne infection, or more serious but rare reactions. I have discussed the alternatives to transfusion, including the risk and consequences of not receiving transfusion. The daughter had an opportunity to ask questions and had agreed to proceed with transfusion of blood components.    Electronically signed by DUY Hills CNP on 5/17/24 at 10:22 PM EDT

## 2024-05-18 NOTE — PROGRESS NOTES
Speech Language Pathology  Facility/Department: [place] [address] - MED SURG/ORTHO  Initial Speech/Language/Cognitive Assessment       NAME:Susan Singh  : 1935 (89 y.o.)   MRN: [id number]  ROOM: [facility]42-01  ADMISSION DATE: 2024  PATIENT DIAGNOSIS(ES): Fall, initial encounter [W19.XXXA]  Hip fracture requiring operative repair, right, closed, initial encounter ([facility]) [S72.001A]  Closed displaced fracture of right femoral neck ([facility]) [S72.001A]  Patient Active Problem List    Diagnosis Date Noted    Thumb dislocation, unspecified laterality, initial encounter 2023    Open dislocation of interphalangeal joint of left thumb, initial encounter 2023    Chronic renal disease, stage III ([facility]) [206326] 2022    Current moderate episode of major depressive disorder without prior episode ([facility]) 2022    Closed displaced fracture of right femoral neck ([facility]) 2024    Acute encephalopathy 2024    Living in assisted living 2023    Microscopic colitis 2021    Diverticulosis of colon 2021    Diarrhea 2021    Anemia due to chronic kidney disease 2021    Pre-diabetes 2020    Sensorineural hearing loss, bilateral 10/20/2020    CKD (chronic kidney disease) stage 3, GFR 30-59 ml/min ([facility]) 01/10/2019    Osteopenia 2016    Gluteal tendinitis 10/02/2015    Pain in joint, pelvic region and thigh 2013    Hyperlipidemia 2012    Insomnia 2012    Hypertension     Ringing in ears     Arthritis      Past Medical History:   Diagnosis Date    Arthritis     Chicken pox     Chills     CKD (chronic kidney disease) stage 3, GFR 30-59 ml/min ([facility]) 1/10/2019    Current moderate episode of major depressive disorder without prior episode ([facility]) 2022    Fever     Hip pain     Hyperlipidemia     Hypertension     Measles     Miscarriage     Osteoarthritis     Pneumonia     Poor circulation     Ringing in ears     Sacroiliitis ([facility]) 4/15/2016     family/guests pt was experiencing cognitive changes from baseline post hip surgery. However, when discussing with family/guests this date they report that pt is mostly back to baseline. SLP conducted informal evaluation through pt history, orientation concepts, and conversation. Per family/guests, pt appears to be back to 'normal self'. No concerns with pt's speech/language abilities at this time. SLP to follow up with pt to ensure pt doesn't decline further from baseline/ no further cognitive concerns arise.         Barriers to home discharge:   [x] inability to manage medications, will need assist/supervision  [x] inability to manage finances, will need assist/supervision  [] inability to effectively communicate in emergent situations (ex: calling 911, stating name, reduced intelligibility, etc)  [x] severity of cognition (mild, mod, severe) with reduced insight negatively impacting safety/independence  [] inability to recall sternal precautions  [] inability to recall and adhere to safe swallow strategies placing pt at risk for aspiration  [] inability to adhere to diet recommendations regarding least restrictive diet  [x] limited assistance at home upon discharge  [] severity of neglect (left/right)      Formal Assessments: Pt was assessed informally through pt and family interview, basic orientation concepts, conversation. Pt disoriented to month, year, date, location. Pt's speech/language are judged to be WFL. Pt may benefit from further cognitive evaluation if further decrease from baseline is observed by family.    Goals:  Long Term Goals:   Time Frame for Long Term Goals: 7 Days (05/25/24)  Goal 1: Pt will demonstrate adequate cognitive linguistic abilities form safe discharge to home with supervision utilizing strategies to maximize safety and independence.    Short Term Goals:  Time Frame for Short Term Goals: 5 Days (05/23/24)  Goal 1: Pt will utilize visual aids to state orientation to time, place and

## 2024-05-18 NOTE — PROGRESS NOTES
Physical Therapy  Facility/Department: [facility] C5 - MED SURG/ORTHO  Daily Treatment Note  NAME: Susan Singh  : 1935  MRN: [id number]    Date of Service: 2024    Discharge Recommendations:  Subacute/Skilled Nursing Facility   PT Equipment Recommendations  Equipment Needed: No  Other: defer to next level of care.    Therapy discharge recommendations take into account each patient's current medical complexities and are subject to input/oversight from the patient's healthcare team.   Barriers to Home Discharge:   [x] Steps to access home entry or bed/bath:   [x] Unable to transfer, ambulate, or propel wheelchair household distances without assist   [x] Unable to perform ADLs without assist   [x] Limited available assist at home upon discharge    [] Patient or family requests d/c to post-acute facility    [x] Poor cognition/safety awareness for d/c to home alone    [] Unable to maintain ordered weight bearing status    [x] Patient with salient signs of long-standing immobility   [] Other: pt at high risk for falls.    Patient Diagnosis(es): The primary encounter diagnosis was Closed displaced fracture of right femoral neck (HCC). A diagnosis of Fall, initial encounter was also pertinent to this visit.    Assessment   Assessment: pt found in bed, agreeable to PT treatment, cleared for treatment by RN.  pt seen in conjunciton with OT to maximize pt safety and mobility and safely progress mobility.  pt demonstrates total A X2 for bed mobility, MAX A X2-TOTAL A X2 for transfers with stedy.  pt essentialy non-verbal throguhout treatment.  only intermittantly follows commands secondary to decreased cognition.  pt demonstrates notable deficits ins trength, balance, mobility, activity tolernace, and funcitonal capacity and would continue to benefit from skilled PT to address the above stated deficits during her stay in the acute care setting.  continue to recommend DC to SNF when medically stable.  Activity  patient    Education  Patient Education  Education Given To: Patient;Family  Education Provided: Role of Therapy;Plan of Care;Family Education  Education Provided Comments: pt essentially non-verbal throughout treatment, educated on proper transfer techinique. no evidence of carry over secondary to decreased cognition.  Education Method: Verbal  Barriers to Learning: Cognition  Education Outcome: Continued education needed;Unable to demonstrate understanding    AM-PAC - Mobility    AM-PAC Basic Mobility - Inpatient   How much help is needed turning from your back to your side while in a flat bed without using bedrails?: A Lot  How much help is needed moving from lying on your back to sitting on the side of a flat bed without using bedrails?: A Lot  How much help is needed moving to and from a bed to a chair?: Total  How much help is needed standing up from a chair using your arms?: Total  How much help is needed walking in hospital room?: Total  How much help is needed climbing 3-5 steps with a railing?: Total  AM-PAC Inpatient Mobility Raw Score : 8  AM-PAC Inpatient T-Scale Score : 28.52  Mobility Inpatient CMS 0-100% Score: 86.62  Mobility Inpatient CMS G-Code Modifier : CM         Therapy Time   Individual Concurrent Group Co-treatment   Time In       0911   Time Out       0953   Minutes       42   Timed Code Treatment Minutes: 42 Minutes       Weston Knowles PT, DPT  If pt is unable to be seen after this session, please let this note serve as discharge summary.  Please see case management note for discharge disposition.  Thank you.

## 2024-05-19 LAB
ALBUMIN SERPL-MCNC: 3.2 G/DL (ref 3.4–5)
ALBUMIN/GLOB SERPL: 0.9 {RATIO} (ref 1.1–2.2)
ALP SERPL-CCNC: 98 U/L (ref 40–129)
ALT SERPL-CCNC: 9 U/L (ref 10–40)
ANION GAP SERPL CALCULATED.3IONS-SCNC: 13 MMOL/L (ref 3–16)
AST SERPL-CCNC: 26 U/L (ref 15–37)
BILIRUB SERPL-MCNC: 0.3 MG/DL (ref 0–1)
BUN SERPL-MCNC: 23 MG/DL (ref 7–20)
CALCIUM SERPL-MCNC: 8.5 MG/DL (ref 8.3–10.6)
CHLORIDE SERPL-SCNC: 98 MMOL/L (ref 99–110)
CO2 SERPL-SCNC: 21 MMOL/L (ref 21–32)
CREAT SERPL-MCNC: 1.1 MG/DL (ref 0.6–1.2)
GFR SERPLBLD CREATININE-BSD FMLA CKD-EPI: 49 ML/MIN/{1.73_M2}
GLUCOSE SERPL-MCNC: 131 MG/DL (ref 70–99)
HCT VFR BLD AUTO: 25.7 % (ref 36–48)
HGB BLD-MCNC: 8.4 G/DL (ref 12–16)
POTASSIUM SERPL-SCNC: 5 MMOL/L (ref 3.5–5.1)
PROT SERPL-MCNC: 6.8 G/DL (ref 6.4–8.2)
SODIUM SERPL-SCNC: 132 MMOL/L (ref 136–145)

## 2024-05-19 PROCEDURE — 6370000000 HC RX 637 (ALT 250 FOR IP): Performed by: STUDENT IN AN ORGANIZED HEALTH CARE EDUCATION/TRAINING PROGRAM

## 2024-05-19 PROCEDURE — 6370000000 HC RX 637 (ALT 250 FOR IP): Performed by: ORTHOPAEDIC SURGERY

## 2024-05-19 PROCEDURE — 2580000003 HC RX 258: Performed by: ORTHOPAEDIC SURGERY

## 2024-05-19 PROCEDURE — 80053 COMPREHEN METABOLIC PANEL: CPT

## 2024-05-19 PROCEDURE — 85014 HEMATOCRIT: CPT

## 2024-05-19 PROCEDURE — 6370000000 HC RX 637 (ALT 250 FOR IP): Performed by: SPECIALIST/TECHNOLOGIST

## 2024-05-19 PROCEDURE — 97530 THERAPEUTIC ACTIVITIES: CPT

## 2024-05-19 PROCEDURE — 97110 THERAPEUTIC EXERCISES: CPT

## 2024-05-19 PROCEDURE — 1200000000 HC SEMI PRIVATE

## 2024-05-19 PROCEDURE — 85018 HEMOGLOBIN: CPT

## 2024-05-19 PROCEDURE — 99024 POSTOP FOLLOW-UP VISIT: CPT | Performed by: PHYSICIAN ASSISTANT

## 2024-05-19 PROCEDURE — 36415 COLL VENOUS BLD VENIPUNCTURE: CPT

## 2024-05-19 RX ORDER — LACTULOSE 10 G/15ML
20 SOLUTION ORAL 3 TIMES DAILY PRN
Status: DISCONTINUED | OUTPATIENT
Start: 2024-05-19 | End: 2024-05-22 | Stop reason: HOSPADM

## 2024-05-19 RX ORDER — POLYETHYLENE GLYCOL 3350 17 G/17G
17 POWDER, FOR SOLUTION ORAL DAILY PRN
Status: DISCONTINUED | OUTPATIENT
Start: 2024-05-19 | End: 2024-05-22 | Stop reason: HOSPADM

## 2024-05-19 RX ORDER — SENNA AND DOCUSATE SODIUM 50; 8.6 MG/1; MG/1
2 TABLET, FILM COATED ORAL DAILY PRN
Status: DISCONTINUED | OUTPATIENT
Start: 2024-05-19 | End: 2024-05-22 | Stop reason: HOSPADM

## 2024-05-19 RX ADMIN — OXYCODONE 10 MG: 5 TABLET ORAL at 08:57

## 2024-05-19 RX ADMIN — ACETAMINOPHEN 650 MG: 325 TABLET ORAL at 08:58

## 2024-05-19 RX ADMIN — Medication 5 MG: at 19:55

## 2024-05-19 RX ADMIN — METHOCARBAMOL 500 MG: 500 TABLET ORAL at 14:16

## 2024-05-19 RX ADMIN — DONEPEZIL HYDROCHLORIDE 10 MG: 5 TABLET, FILM COATED ORAL at 19:55

## 2024-05-19 RX ADMIN — ACETAMINOPHEN 650 MG: 325 TABLET ORAL at 02:18

## 2024-05-19 RX ADMIN — ACETAMINOPHEN 650 MG: 325 TABLET ORAL at 14:16

## 2024-05-19 RX ADMIN — SENNOSIDES AND DOCUSATE SODIUM 2 TABLET: 50; 8.6 TABLET ORAL at 12:37

## 2024-05-19 RX ADMIN — METHOCARBAMOL 500 MG: 500 TABLET ORAL at 08:58

## 2024-05-19 RX ADMIN — SERTRALINE HYDROCHLORIDE 75 MG: 50 TABLET ORAL at 17:17

## 2024-05-19 RX ADMIN — ACETAMINOPHEN 650 MG: 325 TABLET ORAL at 19:55

## 2024-05-19 RX ADMIN — METHOCARBAMOL 500 MG: 500 TABLET ORAL at 19:55

## 2024-05-19 RX ADMIN — Medication 10 ML: at 08:58

## 2024-05-19 RX ADMIN — POLYETHYLENE GLYCOL 3350 17 G: 17 POWDER, FOR SOLUTION ORAL at 08:57

## 2024-05-19 RX ADMIN — LISINOPRIL 10 MG: 10 TABLET ORAL at 08:58

## 2024-05-19 RX ADMIN — Medication 10 ML: at 19:40

## 2024-05-19 ASSESSMENT — PAIN DESCRIPTION - ORIENTATION
ORIENTATION: RIGHT

## 2024-05-19 ASSESSMENT — PAIN SCALES - GENERAL
PAINLEVEL_OUTOF10: 3
PAINLEVEL_OUTOF10: 6
PAINLEVEL_OUTOF10: 4
PAINLEVEL_OUTOF10: 6
PAINLEVEL_OUTOF10: 3
PAINLEVEL_OUTOF10: 4
PAINLEVEL_OUTOF10: 8
PAINLEVEL_OUTOF10: 3
PAINLEVEL_OUTOF10: 4

## 2024-05-19 ASSESSMENT — PAIN DESCRIPTION - LOCATION
LOCATION: HIP

## 2024-05-19 ASSESSMENT — PAIN DESCRIPTION - DESCRIPTORS
DESCRIPTORS: ACHING

## 2024-05-19 ASSESSMENT — PAIN DESCRIPTION - FREQUENCY: FREQUENCY: CONTINUOUS

## 2024-05-19 ASSESSMENT — PAIN DESCRIPTION - PAIN TYPE
TYPE: ACUTE PAIN;SURGICAL PAIN
TYPE: SURGICAL PAIN;ACUTE PAIN
TYPE: ACUTE PAIN;SURGICAL PAIN

## 2024-05-19 ASSESSMENT — PAIN - FUNCTIONAL ASSESSMENT: PAIN_FUNCTIONAL_ASSESSMENT: PREVENTS OR INTERFERES WITH MANY ACTIVE NOT PASSIVE ACTIVITIES

## 2024-05-19 ASSESSMENT — PAIN DESCRIPTION - ONSET: ONSET: ON-GOING

## 2024-05-19 NOTE — PROGRESS NOTES
[facility] Orthopedic Surgery  Progress Note        POD # 4, s/p right hip hemiarthroplasty.    Pt comfortable, no c/o.  Drsg right hip D/C/I,  Mild pain with right hip ROM, NVI    CBC:   Lab Results   Component Value Date/Time    WBC 7.2 05/18/2024 04:18 AM    RBC 2.59 05/18/2024 04:18 AM    HGB 8.4 05/19/2024 01:38 AM    HCT 25.7 05/19/2024 01:38 AM    MCV 91.1 05/18/2024 04:18 AM    MCH 30.4 05/18/2024 04:18 AM    MCHC 33.4 05/18/2024 04:18 AM    RDW 15.0 05/18/2024 04:18 AM     05/18/2024 04:18 AM    MPV 8.7 05/18/2024 04:18 AM     PT/INR:    Lab Results   Component Value Date/Time    PROTIME 13.6 05/14/2024 09:31 PM    INR 1.02 05/14/2024 09:31 PM     PTT:    Lab Results   Component Value Date/Time    APTT 26.3 05/14/2024 09:31 PM   [APTT    A/P: s/p right hip hemiarthroplasty.  - Stable  - PT/OT, WBAT  - Ok to D/C to ECF from ortho standpoint.  - F/U Dr Mosley in 2 weeks.      DESI Rivera 5/19/2024 9:33 AM

## 2024-05-19 NOTE — PROGRESS NOTES
[facility] Progress Note      Name:  Susan Singh /Age/Sex: 1935  (89 y.o. female)   MRN & CSN:  [id number] & [id number] Encounter Date/Time: 2024 10:25 AM EDT   Location:  [facility]0542- PCP: No primary care provider on file.     Attending:Warren Diaz MD         Summary     Hospital Day: 6    Ms. Susan Singh is a 89 y.o. female who was admitted on 2024 with a chief complaint of     Chief Complaint   Patient presents with    Fall     Right hip pain, t-pod placed, pt was confused at scene, does not remember falling, lives at home alone         Assessment and Recommendations     Right hip fracture: Secondary to mechanical fall and presence of osteoporosis. 4 Days Post-Op left hemiarthroplasty without complications.  Continue post-op mgt/rehab plan per ortho.  PO/IV Narcotic analgesia as ordered.      Postoperative versus acute blood loss anemia: Hemoglobin stable at 8.4 status post 1x PRBC on 2024.  Monitor CBC, transfuse to keep Hb >7. Check FOBT once sample is available.  Will restart Lovenox tomorrow    Electrolyte abnormalities: Hypokalemia, hypocalcemia.  Continue Tums and replete as appropriate.  Hyponatremia also noted with hyortonic saline now discontinued.  Continue to encourage encourage oral intake.  Follow BMP.     CKD 2: SCR at baseline, follow BMP    Constipation: No bowel movement since surgery, provide escalating PRN bowel regimen    Hypertension: Well-controlled, continue lisinopril, monitor    Dementia without behavioral disturbance: At baseline, continue Aricept + Zoloft.  Continue supportive care and redirection      Diet ADULT DIET; Regular   DVT Prophylaxis [] Lovenox, []  Heparin, [x] SCDs, [] Ambulation,  [] Eliquis, [] Xarelto  [] Coumadin   Code Status Full Code   Disposition From: Home  Expected Disposition: SNF  Estimated Date of Discharge: 24 hours  Patient requires continued admission due to pending clinical improvement, anemia   Surrogate Decision Maker/

## 2024-05-19 NOTE — PROGRESS NOTES
Physical Therapy  Facility/Department: [facility] C5 - MED SURG/ORTHO  Daily Treatment Note  NAME: Susan Singh  : 1935  MRN: [id number]    Date of Service: 2024    Discharge Recommendations:  Subacute/Skilled Nursing Facility   PT Equipment Recommendations  Equipment Needed: No  Other: defer to next level of care.    Therapy discharge recommendations take into account each patient's current medical complexities and are subject to input/oversight from the patient's healthcare team.   Barriers to Home Discharge:   [x] Steps to access home entry or bed/bath:   [x] Unable to transfer, ambulate, or propel wheelchair household distances without assist   [x] Unable to perform ADLs without assist   [] Limited available assist at home upon discharge    [] Patient or family requests d/c to post-acute facility    [x] Poor cognition/safety awareness for d/c to home alone    [] Unable to maintain ordered weight bearing status    [x] Patient with salient signs of long-standing immobility   [] Other: pt at increased risk for falls.    Patient Diagnosis(es): The primary encounter diagnosis was Closed displaced fracture of right femoral neck (HCC). A diagnosis of Fall, initial encounter was also pertinent to this visit.    Assessment   Assessment: pt found in bed, agreeable to PT treatment, cleared for treatment by RN.  pt seen in conjunciton with OT to maximize pt safety and mobility and safely progress mobility.  pt demonstrates MAX A X2 for bed mobility, MAX A X2-TOTAL A X2 for transfers with stedy.  pt much more alert on today's date, verbalizes with therapy staff, comments on weather and view, able to state name and birthday.  pt able to follow VCs with repetition on today's date.  pt demonstrates notable deficits ins trength, balance, mobility, activity tolerance, funcitonal capacity and cognition and would continue to benefit from skilled PT to address the above stated deficits during her stay in the acute care

## 2024-05-19 NOTE — PROGRESS NOTES
Occupational Therapy  Facility/Department: [facility] C5 - MED SURG/ORTHO  Daily Treatment Note  NAME: Susan Singh  : 1935  MRN: [id number]    Date of Service: 2024    Discharge Recommendations:  Subacute/Skilled Nursing Facility  OT Equipment Recommendations  Equipment Needed: No  Other: defer  Therapy discharge recommendations are subject to collaboration from the patient’s interdisciplinary healthcare team, including MD and case management recommendations.    Barriers to Home Discharge:   [] Steps to access home entry or bed/bath:   [x] Unable to transfer, ambulate, or propel wheelchair household distances without assist   [x] Limited available assist at home upon discharge    [x] Patient or family requests d/c to post-acute facility    [x] Poor cognition/safety awareness for d/c to home alone    [] Unable to maintain ordered weight bearing status    [] Patient with salient signs of long-standing immobility   [x] Decreased independence with ADLs   [x] Increased risk for falls   [] Other:    If pt is unable to be seen after this session, please let this note serve as discharge summary.  Please see case management note for discharge disposition.  Thank you.      Patient Diagnosis(es): The primary encounter diagnosis was Closed displaced fracture of right femoral neck (HCC). A diagnosis of Fall, initial encounter was also pertinent to this visit.     Assessment    Assessment: Co-tx collaboration this date to safely meet goals and will have better occupational performance outcomes with in a co-treatment than 1:1 session. Pt more alert during session, Ox2 to self and month. Pt demo'd ability to follow 1 step commands with inc time and repetition. Pt required max Ax2 for bed mobility and fxl transfer with stedy. Pt grimacing in pain when moving RLE. Continue OT POC.  Activity Tolerance: Treatment limited secondary to decreased cognition;Patient limited by fatigue;Patient limited by endurance  Discharge

## 2024-05-19 NOTE — PLAN OF CARE
Problem: Safety - Adult  Goal: Free from fall injury  5/18/2024 2032 by Debi Carolina RN  Outcome: Progressing  5/18/2024 1313 by Debbie Zimmerman RN  Outcome: Progressing     Problem: Discharge Planning  Goal: Discharge to home or other facility with appropriate resources  5/18/2024 2032 by Debi Carolina RN  Outcome: Progressing  5/18/2024 1313 by Debbie Zimmerman RN  Outcome: Progressing     Problem: Pain  Goal: Verbalizes/displays adequate comfort level or baseline comfort level  5/18/2024 2032 by Debi Carolina RN  Outcome: Progressing  5/18/2024 1313 by Debbie Zimmerman RN  Outcome: Progressing     Problem: Skin/Tissue Integrity  Goal: Absence of new skin breakdown  Description: 1.  Monitor for areas of redness and/or skin breakdown  2.  Assess vascular access sites hourly  3.  Every 4-6 hours minimum:  Change oxygen saturation probe site  4.  Every 4-6 hours:  If on nasal continuous positive airway pressure, respiratory therapy assess nares and determine need for appliance change or resting period.  5/18/2024 2032 by Debi Carolina RN  Outcome: Progressing  5/18/2024 1313 by Debbie Zimmerman RN  Outcome: Progressing     Problem: ABCDS Injury Assessment  Goal: Absence of physical injury  5/18/2024 2032 by Debi Carolina RN  Outcome: Progressing  5/18/2024 1313 by Debbie Zimmerman RN  Outcome: Progressing

## 2024-05-20 ENCOUNTER — APPOINTMENT (OUTPATIENT)
Dept: GENERAL RADIOLOGY | Age: 89
DRG: 522 | End: 2024-05-20
Payer: MEDICARE

## 2024-05-20 LAB
ALBUMIN SERPL-MCNC: 2.8 G/DL (ref 3.4–5)
ALBUMIN/GLOB SERPL: 0.9 {RATIO} (ref 1.1–2.2)
ALP SERPL-CCNC: 88 U/L (ref 40–129)
ALT SERPL-CCNC: 9 U/L (ref 10–40)
ANION GAP SERPL CALCULATED.3IONS-SCNC: 11 MMOL/L (ref 3–16)
AST SERPL-CCNC: 22 U/L (ref 15–37)
BASOPHILS # BLD: 0 K/UL (ref 0–0.2)
BASOPHILS NFR BLD: 0.5 %
BILIRUB SERPL-MCNC: 0.4 MG/DL (ref 0–1)
BUN SERPL-MCNC: 27 MG/DL (ref 7–20)
CALCIUM SERPL-MCNC: 9 MG/DL (ref 8.3–10.6)
CHLORIDE SERPL-SCNC: 102 MMOL/L (ref 99–110)
CO2 SERPL-SCNC: 21 MMOL/L (ref 21–32)
CREAT SERPL-MCNC: 1.1 MG/DL (ref 0.6–1.2)
DEPRECATED RDW RBC AUTO: 14.9 % (ref 12.4–15.4)
EOSINOPHIL # BLD: 0.3 K/UL (ref 0–0.6)
EOSINOPHIL NFR BLD: 4.5 %
GFR SERPLBLD CREATININE-BSD FMLA CKD-EPI: 49 ML/MIN/{1.73_M2}
GLUCOSE SERPL-MCNC: 94 MG/DL (ref 70–99)
HCT VFR BLD AUTO: 25.7 % (ref 36–48)
HEMOCCULT SP1 STL QL: NORMAL
HGB BLD-MCNC: 8.6 G/DL (ref 12–16)
LYMPHOCYTES # BLD: 1 K/UL (ref 1–5.1)
LYMPHOCYTES NFR BLD: 15.7 %
MCH RBC QN AUTO: 30.3 PG (ref 26–34)
MCHC RBC AUTO-ENTMCNC: 33.3 G/DL (ref 31–36)
MCV RBC AUTO: 90.9 FL (ref 80–100)
MONOCYTES # BLD: 0.8 K/UL (ref 0–1.3)
MONOCYTES NFR BLD: 13.1 %
NEUTROPHILS # BLD: 4.1 K/UL (ref 1.7–7.7)
NEUTROPHILS NFR BLD: 66.2 %
PLATELET # BLD AUTO: 208 K/UL (ref 135–450)
PMV BLD AUTO: 8.3 FL (ref 5–10.5)
POTASSIUM SERPL-SCNC: 5.5 MMOL/L (ref 3.5–5.1)
PROT SERPL-MCNC: 6 G/DL (ref 6.4–8.2)
RBC # BLD AUTO: 2.83 M/UL (ref 4–5.2)
SODIUM SERPL-SCNC: 134 MMOL/L (ref 136–145)
TROPONIN, HIGH SENSITIVITY: 30 NG/L (ref 0–14)
WBC # BLD AUTO: 6.1 K/UL (ref 4–11)

## 2024-05-20 PROCEDURE — 6370000000 HC RX 637 (ALT 250 FOR IP): Performed by: STUDENT IN AN ORGANIZED HEALTH CARE EDUCATION/TRAINING PROGRAM

## 2024-05-20 PROCEDURE — 6360000002 HC RX W HCPCS: Performed by: SPECIALIST/TECHNOLOGIST

## 2024-05-20 PROCEDURE — 80053 COMPREHEN METABOLIC PANEL: CPT

## 2024-05-20 PROCEDURE — 97530 THERAPEUTIC ACTIVITIES: CPT

## 2024-05-20 PROCEDURE — 97535 SELF CARE MNGMENT TRAINING: CPT

## 2024-05-20 PROCEDURE — 6370000000 HC RX 637 (ALT 250 FOR IP): Performed by: ORTHOPAEDIC SURGERY

## 2024-05-20 PROCEDURE — 82270 OCCULT BLOOD FECES: CPT

## 2024-05-20 PROCEDURE — 84484 ASSAY OF TROPONIN QUANT: CPT

## 2024-05-20 PROCEDURE — 97110 THERAPEUTIC EXERCISES: CPT

## 2024-05-20 PROCEDURE — 85025 COMPLETE CBC W/AUTO DIFF WBC: CPT

## 2024-05-20 PROCEDURE — 71045 X-RAY EXAM CHEST 1 VIEW: CPT

## 2024-05-20 PROCEDURE — 6370000000 HC RX 637 (ALT 250 FOR IP): Performed by: SPECIALIST/TECHNOLOGIST

## 2024-05-20 PROCEDURE — 2580000003 HC RX 258: Performed by: ORTHOPAEDIC SURGERY

## 2024-05-20 PROCEDURE — 1200000000 HC SEMI PRIVATE

## 2024-05-20 PROCEDURE — 36415 COLL VENOUS BLD VENIPUNCTURE: CPT

## 2024-05-20 PROCEDURE — 93005 ELECTROCARDIOGRAM TRACING: CPT | Performed by: STUDENT IN AN ORGANIZED HEALTH CARE EDUCATION/TRAINING PROGRAM

## 2024-05-20 RX ADMIN — ALPRAZOLAM 0.25 MG: 0.25 TABLET ORAL at 20:22

## 2024-05-20 RX ADMIN — Medication 5 MG: at 20:22

## 2024-05-20 RX ADMIN — ALPRAZOLAM 0.25 MG: 0.25 TABLET ORAL at 00:39

## 2024-05-20 RX ADMIN — SODIUM ZIRCONIUM CYCLOSILICATE 10 G: 10 POWDER, FOR SUSPENSION ORAL at 11:38

## 2024-05-20 RX ADMIN — Medication 10 ML: at 08:56

## 2024-05-20 RX ADMIN — SENNOSIDES AND DOCUSATE SODIUM 2 TABLET: 50; 8.6 TABLET ORAL at 14:49

## 2024-05-20 RX ADMIN — OXYCODONE HYDROCHLORIDE 5 MG: 5 TABLET ORAL at 06:23

## 2024-05-20 RX ADMIN — ENOXAPARIN SODIUM 30 MG: 100 INJECTION SUBCUTANEOUS at 08:55

## 2024-05-20 RX ADMIN — LISINOPRIL 10 MG: 10 TABLET ORAL at 08:56

## 2024-05-20 RX ADMIN — METHOCARBAMOL 500 MG: 500 TABLET ORAL at 14:49

## 2024-05-20 RX ADMIN — ACETAMINOPHEN 650 MG: 325 TABLET ORAL at 08:56

## 2024-05-20 RX ADMIN — OXYCODONE HYDROCHLORIDE 5 MG: 5 TABLET ORAL at 19:47

## 2024-05-20 RX ADMIN — METHOCARBAMOL 500 MG: 500 TABLET ORAL at 20:22

## 2024-05-20 RX ADMIN — POLYETHYLENE GLYCOL 3350 17 G: 17 POWDER, FOR SOLUTION ORAL at 14:49

## 2024-05-20 RX ADMIN — METHOCARBAMOL 500 MG: 500 TABLET ORAL at 08:55

## 2024-05-20 RX ADMIN — Medication 10 ML: at 20:24

## 2024-05-20 RX ADMIN — DONEPEZIL HYDROCHLORIDE 10 MG: 5 TABLET, FILM COATED ORAL at 20:22

## 2024-05-20 RX ADMIN — SERTRALINE HYDROCHLORIDE 75 MG: 50 TABLET ORAL at 17:12

## 2024-05-20 RX ADMIN — ACETAMINOPHEN 650 MG: 325 TABLET ORAL at 14:49

## 2024-05-20 ASSESSMENT — PAIN SCALES - GENERAL
PAINLEVEL_OUTOF10: 6
PAINLEVEL_OUTOF10: 6
PAINLEVEL_OUTOF10: 1
PAINLEVEL_OUTOF10: 1

## 2024-05-20 ASSESSMENT — PAIN - FUNCTIONAL ASSESSMENT: PAIN_FUNCTIONAL_ASSESSMENT: PREVENTS OR INTERFERES SOME ACTIVE ACTIVITIES AND ADLS

## 2024-05-20 ASSESSMENT — PAIN DESCRIPTION - PAIN TYPE: TYPE: SURGICAL PAIN

## 2024-05-20 ASSESSMENT — PAIN SCALES - WONG BAKER: WONGBAKER_NUMERICALRESPONSE: NO HURT

## 2024-05-20 ASSESSMENT — PAIN DESCRIPTION - FREQUENCY: FREQUENCY: CONTINUOUS

## 2024-05-20 ASSESSMENT — PAIN DESCRIPTION - DESCRIPTORS
DESCRIPTORS: ACHING
DESCRIPTORS: ACHING

## 2024-05-20 ASSESSMENT — PAIN DESCRIPTION - LOCATION
LOCATION: HIP
LOCATION: HIP

## 2024-05-20 ASSESSMENT — PAIN DESCRIPTION - ONSET: ONSET: ON-GOING

## 2024-05-20 ASSESSMENT — PAIN DESCRIPTION - ORIENTATION
ORIENTATION: RIGHT
ORIENTATION: RIGHT

## 2024-05-20 NOTE — PROGRESS NOTES
Occupational Therapy  Facility/Department: [facility] C5 - MED SURG/ORTHO  Daily Treatment Note  NAME: Susan Singh  : 1935  MRN: [id number]    Date of Service: 2024    Discharge Recommendations:  Subacute/Skilled Nursing Facility  OT Equipment Recommendations  Equipment Needed: No  Other: defer    Therapy discharge recommendations take into account each patient's current medical complexities and are subject to input/oversight from the patient's healthcare team.   Barriers to Home Discharge:   [] Steps to access home entry or bed/bath:   [x] Unable to transfer, ambulate, or propel wheelchair household distances without assist   [x] Limited available assist at home upon discharge    [x] Patient or family requests d/c to post-acute facility    [x] Poor cognition/safety awareness for d/c to home alone    []Unable to maintain ordered weight bearing status    [] Patient with salient signs of long-standing immobility   [x] Patient is at risk for falls due to:   [x] Other: Decreased safety/IND with ADLs    If pt is unable to be seen after this session, please let this note serve as discharge summary.  Please see case management note for discharge disposition.  Thank you.    Patient Diagnosis(es): The primary encounter diagnosis was Closed displaced fracture of right femoral neck (HCC). A diagnosis of Fall, initial encounter was also pertinent to this visit.     Assessment     Co-tx collaboration this date with PT to safely meet goals and promote increased occupational performance outcomes with in a co-treatment than 1:1 session. Pt requires Max A x2 for bed mobility, and Max x2 to  [place] for bed>chair transfer. Pt tati to sit EOB with Min A-SBA. Pt initially requires Mod A to feed self once seated in chair, but progressed to needing cues only towards EOS. Continuing to recommending pt d/c to SNF 2/2 current level of assist and to help pt return to baseline fxn. Continue POC.    Activity Tolerance:     40   Timed Code Treatment Minutes: 40 Minutes       Teresa Bedolla, OT

## 2024-05-20 NOTE — PLAN OF CARE
Problem: Safety - Adult  Goal: Free from fall injury  5/19/2024 2022 by Debi Carolina RN  Outcome: Progressing  5/19/2024 1708 by Abhinav Person RN  Outcome: Progressing     Problem: Discharge Planning  Goal: Discharge to home or other facility with appropriate resources  5/19/2024 2022 by Debi Carolina RN  Outcome: Progressing  5/19/2024 1708 by Abhinav Person RN  Outcome: Progressing     Problem: Pain  Goal: Verbalizes/displays adequate comfort level or baseline comfort level  5/19/2024 2022 by Debi Carolina RN  Outcome: Progressing  5/19/2024 1708 by Abhinav Person RN  Outcome: Progressing     Problem: Skin/Tissue Integrity  Goal: Absence of new skin breakdown  Description: 1.  Monitor for areas of redness and/or skin breakdown  2.  Assess vascular access sites hourly  3.  Every 4-6 hours minimum:  Change oxygen saturation probe site  4.  Every 4-6 hours:  If on nasal continuous positive airway pressure, respiratory therapy assess nares and determine need for appliance change or resting period.  5/19/2024 2022 by Debi Carolina RN  Outcome: Progressing  5/19/2024 1708 by Abhinav Person RN  Outcome: Progressing     Problem: ABCDS Injury Assessment  Goal: Absence of physical injury  5/19/2024 2022 by Debi Carolina RN  Outcome: Progressing  5/19/2024 1708 by Abhinav Person RN  Outcome: Progressing

## 2024-05-20 NOTE — PROGRESS NOTES
Hospital Medicine Progress Note      Date of Admission: 5/14/2024  Hospital Day: 7    Chief Admission Complaint:  Fall     Subjective: Patient is in hospital bed awake and alert.  No new issues or complaints today.  Patient states that she feels \"okay, but tired \".  Patient continues to have cough postsurgical.  Has been working with spirometer to improve cough.  Patient has been eating appropriately. Denies fevers, chills, sweats. Denies chest pain & palpitations. Denies nausea, vomiting, or diarrhea. Denies changes in urination. Spoke with nursing staff and was informed of no acute issues overnight.    Presenting Admission History:       89 y.o. female who lives independently at home w/ hx HTN/CKD and mild dementia who presented to [facility] with acute severe R hip pain after witnessed mechanical fall w/out head trauma or LOC.  Pain is worse w/ movement and is unable to bear weight.      She denies any CP or SOB suggestive of active ischemia/failure.         The patient denies any fever/chills or other signs/sxs of systemic illness or identifiable aggravating/alleviating factors\"    Assessment/Plan:      Current Principal Problem:  Closed displaced fracture of right femoral neck (HCC)    R Hip Fracture - Acute fx 2nd to mechanical fall.  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  Ortho consulted and appreciated in advance.  OK to OR w/out further Cardiopulmonary evaluation w/out signs/sxs of active ischemia/failure.  Continue post-op mgt/rehab plan per ortho.  PO/IV Narcotic analgesia as ordered.      Low Grade Fever  -Etiology unknown at this time  -Temperature 100.1 °F  -Chest x-ray pending  -Urinalysis pending  -Will continue to monitor temperature daily     Anemia - etiology clinically unable to determine, w/out evidence of active bleeding/hemolysis.  Cultures:   Lab Results   Component Value Date/Time    LABURIN No growth at 18 to 36 hours 10/01/2021 04:38 PM     Blood Cultures:   Lab Results   Component Value Date/Time    BC No Growth after 4 days of incubation. 01/28/2024 06:01 PM     Lab Results   Component Value Date/Time    BLOODCULT2 No Growth after 4 days of incubation. 01/29/2024 12:59 AM     Organism:   Lab Results   Component Value Date/Time    ORG Enterococcus faecalis 07/17/2018 10:36 AM         Chris Vines DO

## 2024-05-20 NOTE — CARE COORDINATION
CM update: LOS # 6; Followed by IM, PT/OT, Orthopedic. Patient has low grade fever this Am. Orders for CXR and urine sample.Patient has pre-cert to [facility] through 5/29. Will follow.Samantha Petit RN

## 2024-05-20 NOTE — PROGRESS NOTES
Physical Therapy  Facility/Department: [facility] C5 - MED SURG/ORTHO  Daily Treatment Note  NAME: Susan Singh  : 1935  MRN: [id number]    Date of Service: 2024    Discharge Recommendations:  Subacute/Skilled Nursing Facility   PT Equipment Recommendations  Equipment Needed: No  Other: defer to next level of care.    Patient Diagnosis(es): The primary encounter diagnosis was Closed displaced fracture of right femoral neck (HCC). A diagnosis of Fall, initial encounter was also pertinent to this visit.    Assessment   Assessment: pt found in bed, agreeable to PT treatment, cleared for treatment by RN.  pt seen in conjunciton with OT to maximize pt safety and mobility and safely progress mobility.  pt demonstrates MAX A X2 for bed mobility, dependent/total A for transfers with stedy.  pt able to follow some VCs with repetition on today's date.  pt demonstrates notable deficits ins trength, balance, mobility, activity tolerance, funcitonal capacity and cognition and would continue to benefit from skilled PT to address the above stated deficits during her stay in the acute care setting.  continue to recommend DC to SNF when medically stable.  Activity Tolerance: Treatment limited secondary to decreased cognition;Patient limited by fatigue;Patient limited by pain  Equipment Needed: No  Other: defer to next level of care.     Plan    Physical Therapy Plan  General Plan: 1 time a day 7 days a week  Current Treatment Recommendations: Strengthening;Balance training;Functional mobility training;ROM;Transfer training;Endurance training;Pain management;Gait training;Home exercise program;Safety education & training;Patient/Caregiver education & training;Therapeutic activities     Restrictions  Restrictions/Precautions  Restrictions/Precautions: Fall Risk, Weight Bearing  Required Braces or Orthoses?: No  Lower Extremity Weight Bearing Restrictions  Right Lower Extremity Weight Bearing: Weight Bearing As

## 2024-05-21 LAB
ALBUMIN SERPL-MCNC: 2.9 G/DL (ref 3.4–5)
ALBUMIN/GLOB SERPL: 1 {RATIO} (ref 1.1–2.2)
ALP SERPL-CCNC: 88 U/L (ref 40–129)
ALT SERPL-CCNC: 10 U/L (ref 10–40)
ANION GAP SERPL CALCULATED.3IONS-SCNC: 8 MMOL/L (ref 3–16)
AST SERPL-CCNC: 20 U/L (ref 15–37)
BASOPHILS # BLD: 0.1 K/UL (ref 0–0.2)
BASOPHILS NFR BLD: 1 %
BILIRUB SERPL-MCNC: 0.3 MG/DL (ref 0–1)
BILIRUB UR QL STRIP.AUTO: NEGATIVE
BUN SERPL-MCNC: 28 MG/DL (ref 7–20)
CALCIUM SERPL-MCNC: 8.9 MG/DL (ref 8.3–10.6)
CHLORIDE SERPL-SCNC: 102 MMOL/L (ref 99–110)
CLARITY UR: CLEAR
CO2 SERPL-SCNC: 24 MMOL/L (ref 21–32)
COLOR UR: YELLOW
CREAT SERPL-MCNC: 1.1 MG/DL (ref 0.6–1.2)
DEPRECATED RDW RBC AUTO: 14.7 % (ref 12.4–15.4)
EKG ATRIAL RATE: 68 BPM
EKG DIAGNOSIS: NORMAL
EKG P AXIS: 46 DEGREES
EKG P-R INTERVAL: 172 MS
EKG Q-T INTERVAL: 382 MS
EKG QRS DURATION: 86 MS
EKG QTC CALCULATION (BAZETT): 406 MS
EKG R AXIS: -9 DEGREES
EKG T AXIS: 12 DEGREES
EKG VENTRICULAR RATE: 68 BPM
EOSINOPHIL # BLD: 0.3 K/UL (ref 0–0.6)
EOSINOPHIL NFR BLD: 6.4 %
GFR SERPLBLD CREATININE-BSD FMLA CKD-EPI: 49 ML/MIN/{1.73_M2}
GLUCOSE SERPL-MCNC: 92 MG/DL (ref 70–99)
GLUCOSE UR STRIP.AUTO-MCNC: NEGATIVE MG/DL
HCT VFR BLD AUTO: 25.1 % (ref 36–48)
HGB BLD-MCNC: 8.2 G/DL (ref 12–16)
HGB UR QL STRIP.AUTO: NEGATIVE
KETONES UR STRIP.AUTO-MCNC: NEGATIVE MG/DL
LEUKOCYTE ESTERASE UR QL STRIP.AUTO: NEGATIVE
LYMPHOCYTES # BLD: 1.1 K/UL (ref 1–5.1)
LYMPHOCYTES NFR BLD: 20.4 %
MCH RBC QN AUTO: 29.7 PG (ref 26–34)
MCHC RBC AUTO-ENTMCNC: 32.6 G/DL (ref 31–36)
MCV RBC AUTO: 91 FL (ref 80–100)
MONOCYTES # BLD: 0.7 K/UL (ref 0–1.3)
MONOCYTES NFR BLD: 12.9 %
NEUTROPHILS # BLD: 3.1 K/UL (ref 1.7–7.7)
NEUTROPHILS NFR BLD: 59.3 %
NITRITE UR QL STRIP.AUTO: NEGATIVE
PH UR STRIP.AUTO: 6 [PH] (ref 5–8)
PLATELET # BLD AUTO: 231 K/UL (ref 135–450)
PMV BLD AUTO: 7.6 FL (ref 5–10.5)
POTASSIUM SERPL-SCNC: 5 MMOL/L (ref 3.5–5.1)
PROT SERPL-MCNC: 5.9 G/DL (ref 6.4–8.2)
PROT UR STRIP.AUTO-MCNC: NEGATIVE MG/DL
RBC # BLD AUTO: 2.75 M/UL (ref 4–5.2)
SODIUM SERPL-SCNC: 134 MMOL/L (ref 136–145)
SP GR UR STRIP.AUTO: <=1.005 (ref 1–1.03)
UA COMPLETE W REFLEX CULTURE PNL UR: NORMAL
UA DIPSTICK W REFLEX MICRO PNL UR: NORMAL
URN SPEC COLLECT METH UR: NORMAL
UROBILINOGEN UR STRIP-ACNC: 0.2 E.U./DL
WBC # BLD AUTO: 5.3 K/UL (ref 4–11)

## 2024-05-21 PROCEDURE — 6370000000 HC RX 637 (ALT 250 FOR IP): Performed by: SPECIALIST/TECHNOLOGIST

## 2024-05-21 PROCEDURE — 6360000002 HC RX W HCPCS: Performed by: SPECIALIST/TECHNOLOGIST

## 2024-05-21 PROCEDURE — 93010 ELECTROCARDIOGRAM REPORT: CPT | Performed by: INTERNAL MEDICINE

## 2024-05-21 PROCEDURE — 6370000000 HC RX 637 (ALT 250 FOR IP): Performed by: ORTHOPAEDIC SURGERY

## 2024-05-21 PROCEDURE — 6370000000 HC RX 637 (ALT 250 FOR IP): Performed by: STUDENT IN AN ORGANIZED HEALTH CARE EDUCATION/TRAINING PROGRAM

## 2024-05-21 PROCEDURE — 85025 COMPLETE CBC W/AUTO DIFF WBC: CPT

## 2024-05-21 PROCEDURE — 80053 COMPREHEN METABOLIC PANEL: CPT

## 2024-05-21 PROCEDURE — 97530 THERAPEUTIC ACTIVITIES: CPT

## 2024-05-21 PROCEDURE — 1200000000 HC SEMI PRIVATE

## 2024-05-21 PROCEDURE — 81003 URINALYSIS AUTO W/O SCOPE: CPT

## 2024-05-21 PROCEDURE — 97110 THERAPEUTIC EXERCISES: CPT

## 2024-05-21 PROCEDURE — 51701 INSERT BLADDER CATHETER: CPT

## 2024-05-21 PROCEDURE — 51702 INSERT TEMP BLADDER CATH: CPT

## 2024-05-21 PROCEDURE — 2580000003 HC RX 258: Performed by: ORTHOPAEDIC SURGERY

## 2024-05-21 PROCEDURE — 51798 US URINE CAPACITY MEASURE: CPT

## 2024-05-21 RX ORDER — LISINOPRIL 10 MG/1
10 TABLET ORAL ONCE
Status: COMPLETED | OUTPATIENT
Start: 2024-05-21 | End: 2024-05-21

## 2024-05-21 RX ORDER — LISINOPRIL 20 MG/1
20 TABLET ORAL DAILY
Status: DISCONTINUED | OUTPATIENT
Start: 2024-05-22 | End: 2024-05-22 | Stop reason: HOSPADM

## 2024-05-21 RX ADMIN — ACETAMINOPHEN 650 MG: 325 TABLET ORAL at 21:04

## 2024-05-21 RX ADMIN — DONEPEZIL HYDROCHLORIDE 10 MG: 5 TABLET, FILM COATED ORAL at 21:04

## 2024-05-21 RX ADMIN — ACETAMINOPHEN 650 MG: 325 TABLET ORAL at 08:26

## 2024-05-21 RX ADMIN — METHOCARBAMOL 500 MG: 500 TABLET ORAL at 08:26

## 2024-05-21 RX ADMIN — Medication 5 MG: at 21:04

## 2024-05-21 RX ADMIN — OXYCODONE HYDROCHLORIDE 5 MG: 5 TABLET ORAL at 17:53

## 2024-05-21 RX ADMIN — LISINOPRIL 10 MG: 10 TABLET ORAL at 12:02

## 2024-05-21 RX ADMIN — LISINOPRIL 10 MG: 10 TABLET ORAL at 08:26

## 2024-05-21 RX ADMIN — ENOXAPARIN SODIUM 30 MG: 100 INJECTION SUBCUTANEOUS at 08:26

## 2024-05-21 RX ADMIN — OXYCODONE 10 MG: 5 TABLET ORAL at 21:58

## 2024-05-21 RX ADMIN — OXYCODONE HYDROCHLORIDE 5 MG: 5 TABLET ORAL at 00:02

## 2024-05-21 RX ADMIN — ACETAMINOPHEN 650 MG: 325 TABLET ORAL at 14:31

## 2024-05-21 RX ADMIN — SERTRALINE HYDROCHLORIDE 75 MG: 50 TABLET ORAL at 17:54

## 2024-05-21 RX ADMIN — OXYCODONE HYDROCHLORIDE 5 MG: 5 TABLET ORAL at 08:26

## 2024-05-21 RX ADMIN — METHOCARBAMOL 500 MG: 500 TABLET ORAL at 21:04

## 2024-05-21 RX ADMIN — Medication 10 ML: at 08:27

## 2024-05-21 RX ADMIN — METHOCARBAMOL 500 MG: 500 TABLET ORAL at 14:31

## 2024-05-21 ASSESSMENT — PAIN SCALES - PAIN ASSESSMENT IN ADVANCED DEMENTIA (PAINAD)
TOTALSCORE: 4
BREATHING: NORMAL
BODYLANGUAGE: RIGID, FISTS CLENCHED, KNEES UP, PUSHING/PULLING AWAY, STRIKES OUT
TOTALSCORE: 0
NEGVOCALIZATION: OCCASIONAL MOAN/GROAN, LOW SPEECH, NEGATIVE/DISAPPROVING QUALITY
FACIALEXPRESSION: SMILING OR INEXPRESSIVE
TOTALSCORE: 4
TOTALSCORE: 7
CONSOLABILITY: NO NEED TO CONSOLE
BREATHING: NORMAL
BODYLANGUAGE: RELAXED
BREATHING: OCCASIONAL LABORED BREATHING, SHORT PERIOD OF HYPERVENTILATION
FACIALEXPRESSION: FACIAL GRIMACING
BODYLANGUAGE: TENSE, DISTRESSED PACING, FIDGETING
NEGVOCALIZATION: REPEATED TROUBLED CALLING OUT, LOUD MOANING/GROANING, CRYING
FACIALEXPRESSION: SAD, FRIGHTENED, FROWN
FACIALEXPRESSION: SAD, FRIGHTENED, FROWN
CONSOLABILITY: DISTRACTED OR REASSURED BY VOICE/TOUCH
BREATHING: NORMAL
NEGVOCALIZATION: OCCASIONAL MOAN/GROAN, LOW SPEECH, NEGATIVE/DISAPPROVING QUALITY
CONSOLABILITY: NO NEED TO CONSOLE
BODYLANGUAGE: TENSE, DISTRESSED PACING, FIDGETING
CONSOLABILITY: DISTRACTED OR REASSURED BY VOICE/TOUCH

## 2024-05-21 ASSESSMENT — PAIN DESCRIPTION - ORIENTATION
ORIENTATION: RIGHT
ORIENTATION: RIGHT

## 2024-05-21 ASSESSMENT — PAIN SCALES - GENERAL
PAINLEVEL_OUTOF10: 4
PAINLEVEL_OUTOF10: 4
PAINLEVEL_OUTOF10: 0

## 2024-05-21 ASSESSMENT — PAIN SCALES - WONG BAKER
WONGBAKER_NUMERICALRESPONSE: HURTS EVEN MORE
WONGBAKER_NUMERICALRESPONSE: NO HURT

## 2024-05-21 ASSESSMENT — PAIN DESCRIPTION - ONSET: ONSET: ON-GOING

## 2024-05-21 ASSESSMENT — PAIN DESCRIPTION - FREQUENCY: FREQUENCY: CONTINUOUS

## 2024-05-21 ASSESSMENT — PAIN DESCRIPTION - PAIN TYPE: TYPE: SURGICAL PAIN

## 2024-05-21 ASSESSMENT — PAIN DESCRIPTION - LOCATION
LOCATION: HIP
LOCATION: HIP

## 2024-05-21 ASSESSMENT — PAIN - FUNCTIONAL ASSESSMENT
PAIN_FUNCTIONAL_ASSESSMENT: ACTIVITIES ARE NOT PREVENTED
PAIN_FUNCTIONAL_ASSESSMENT: PREVENTS OR INTERFERES SOME ACTIVE ACTIVITIES AND ADLS

## 2024-05-21 ASSESSMENT — PAIN DESCRIPTION - DESCRIPTORS
DESCRIPTORS: ACHING
DESCRIPTORS: ACHING

## 2024-05-21 NOTE — PROGRESS NOTES
Occupational Therapy  Facility/Department: [facility] C5 - MED SURG/ORTHO  Daily Treatment Note  NAME: Susan Singh  : 1935  MRN: [id number]    Date of Service: 2024    Discharge Recommendations:  Subacute/Skilled Nursing Facility       Therapy discharge recommendations take into account each patient's current medical complexities and are subject to input/oversight from the patient's healthcare team.   Barriers to Home Discharge:   [] Steps to access home entry or bed/bath   [x] Unable to transfer, ambulate, or propel wheelchair household distances without assist   [x] Limited available assist at home upon discharge    [x] Patient or family requests d/c to post-acute facility    [x] Poor cognition/safety awareness for d/c to home alone    []Unable to maintain ordered weight bearing status    [] Patient with salient signs of long-standing immobility   [x] Patient is at risk for falls due to:   [x] Other: Decreased safety/IND with ADLs    Patient Diagnosis(es): The primary encounter diagnosis was Closed displaced fracture of right femoral neck (HCC). A diagnosis of Fall, initial encounter was also pertinent to this visit.     Assessment    Assessment: Pt tolerated OT session fair, able to participate in bed mobility and transfer training without c/o this session. Pt very lethargic, falling asleep between activities and occasionally during therex. Pt unsafe to remain up in chair due to fatigue and elevated BP, returned to bed for safety. Co-tx collaboration this date with PT staff to safely progress pt toward goals. Pt will have better occupational performance outcomes within a co-treatment than 1:1 session. Pt functioning below her baseline, continue to recommend SNF at d/c.   Activity Tolerance: Patient limited by fatigue;Treatment limited secondary to decreased cognition  Discharge Recommendations: Subacute/Skilled Nursing Facility      Plan   Occupational Therapy Plan  Times Per Week: 4-6x/week  8  AM-PAC Inpatient ADL T-Scale Score : 22.86  ADL Inpatient CMS 0-100% Score: 85.69  ADL Inpatient CMS G-Code Modifier : CM    Therapy Time   Individual Concurrent Group Co-treatment   Time In       1018   Time Out       1103   Minutes       45           POOJA Hylton/L

## 2024-05-21 NOTE — PROGRESS NOTES
Physical Therapy  Facility/Department: [facility] C5 - MED SURG/ORTHO  Daily Treatment Note  NAME: Susan Singh  : 1935  MRN: [id number]    Date of Service: 2024    Discharge Recommendations:  Subacute/Skilled Nursing Facility   PT Equipment Recommendations  Equipment Needed: No  Other: defer to next level of care.    Patient Diagnosis(es): The primary encounter diagnosis was Closed displaced fracture of right femoral neck (HCC). A diagnosis of Fall, initial encounter was also pertinent to this visit.    Assessment   Assessment: Pt improved sitting balance to min to cga. She required max A x 2 for transfers to stand and dependent transf bed to chair.  Pt became very lethargic while in chair, falling asleep between activities and occasionally during therex. Pt unsafe to remain up in chair due to fatigue and elevated BP, returned to bed for safety. Co-tx collaboration this date with OT staff to safely progress pt toward goals. Pt functioning below her baseline, continue to recommend SNF at d/c.  Activity Tolerance: Patient limited by fatigue;Treatment limited secondary to decreased cognition;Patient limited by endurance  Equipment Needed: No  Other: defer to next level of care.     Plan    Physical Therapy Plan  General Plan: 1 time a day 7 days a week  Current Treatment Recommendations: Strengthening;Balance training;Functional mobility training;ROM;Transfer training;Endurance training;Pain management;Gait training;Home exercise program;Safety education & training;Patient/Caregiver education & training;Therapeutic activities     Restrictions  Restrictions/Precautions  Restrictions/Precautions: Fall Risk, Weight Bearing  Required Braces or Orthoses?: No  Lower Extremity Weight Bearing Restrictions  Right Lower Extremity Weight Bearing: Weight Bearing As Tolerated  Position Activity Restriction  Other position/activity restrictions: IV, tele     Subjective    Subjective  Subjective: pt found in bed,  1021   Time Out       1103   Minutes       42   Timed Code Treatment Minutes: 42 Minutes       Simeon العراقي

## 2024-05-21 NOTE — PLAN OF CARE
Problem: Safety - Adult  Goal: Free from fall injury  5/21/2024 1106 by Jeffrey Winter LPN  Outcome: Progressing  5/21/2024 0048 by Esme Ontiveros RN  Outcome: Progressing     Problem: Discharge Planning  Goal: Discharge to home or other facility with appropriate resources  5/21/2024 1106 by Jeffrey Winter LPN  Outcome: Progressing  5/21/2024 0048 by Esme Ontiveros RN  Outcome: Progressing     Problem: Pain  Goal: Verbalizes/displays adequate comfort level or baseline comfort level  5/21/2024 1106 by Jeffrey Winter LPN  Outcome: Progressing  5/21/2024 0048 by Esme Ontiveros RN  Outcome: Progressing     Problem: Skin/Tissue Integrity  Goal: Absence of new skin breakdown  Description: 1.  Monitor for areas of redness and/or skin breakdown  2.  Assess vascular access sites hourly  3.  Every 4-6 hours minimum:  Change oxygen saturation probe site  4.  Every 4-6 hours:  If on nasal continuous positive airway pressure, respiratory therapy assess nares and determine need for appliance change or resting period.  5/21/2024 1106 by Jeffrey Winter LPN  Outcome: Progressing  5/21/2024 0048 by Esme Ontiveros, RN  Outcome: Progressing

## 2024-05-21 NOTE — PROGRESS NOTES
Hospital Medicine Progress Note      Date of Admission: 5/14/2024  Hospital Day: 8    Chief Admission Complaint:  Fall     Subjective: Patient is hospital bed alert and oriented.  No new issues or complaints today.  Patient states she feels \"okay \".  Denies any pain at this time. Spoke with nursing staff and was informed of no acute issues overnight.      Presenting Admission History:       89 y.o. female who lives independently at home w/ hx HTN/CKD and mild dementia who presented to [facility] with acute severe R hip pain after witnessed mechanical fall w/out head trauma or LOC.  Pain is worse w/ movement and is unable to bear weight.      She denies any CP or SOB suggestive of active ischemia/failure.         The patient denies any fever/chills or other signs/sxs of systemic illness or identifiable aggravating/alleviating factors\"    Assessment/Plan:      Current Principal Problem:  Closed displaced fracture of right femoral neck (HCC)    R Hip Fracture - Acute fx 2nd to mechanical fall.  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  Ortho consulted and appreciated in advance.  OK to OR w/out further Cardiopulmonary evaluation w/out signs/sxs of active ischemia/failure.  Continue post-op mgt/rehab plan per ortho.  PO/IV Narcotic analgesia as ordered.    -Ortho signed off    Low Grade Fever  -Etiology unknown at this time  -Temperature 100.1 °F  -Chest x-ray resulted  -Urinalysis resulted  -Will continue to monitor temperature daily  -Patient has not had low-grade fevers since the single episode     Anemia - etiology clinically unable to determine, w/out evidence of active bleeding/hemolysis. Asymptomatic w/out indication for transfusion. Follow serial labs - documented and reviewed.     Electrolyte abnormalities - Hypokalemia, hypocalcemia.  Continue Tums and replete

## 2024-05-21 NOTE — CARE COORDINATION
CM update: LOS # 7; Followed by IM, Orthopedic, PT/OT. Patient has been accepted at [facility] and pre-cert is good through 5/29/24. Mariano sanders.Samantha Petit RN

## 2024-05-22 VITALS
OXYGEN SATURATION: 97 % | DIASTOLIC BLOOD PRESSURE: 74 MMHG | HEART RATE: 82 BPM | WEIGHT: 122.58 LBS | BODY MASS INDEX: 24.07 KG/M2 | RESPIRATION RATE: 16 BRPM | TEMPERATURE: 98.9 F | SYSTOLIC BLOOD PRESSURE: 135 MMHG | HEIGHT: 60 IN

## 2024-05-22 LAB
BASOPHILS # BLD: 0 K/UL (ref 0–0.2)
BASOPHILS NFR BLD: 0.8 %
DEPRECATED RDW RBC AUTO: 14.8 % (ref 12.4–15.4)
EOSINOPHIL # BLD: 0.4 K/UL (ref 0–0.6)
EOSINOPHIL NFR BLD: 6 %
HCT VFR BLD AUTO: 23.3 % (ref 36–48)
HGB BLD-MCNC: 7.7 G/DL (ref 12–16)
LYMPHOCYTES # BLD: 1.3 K/UL (ref 1–5.1)
LYMPHOCYTES NFR BLD: 22.8 %
MCH RBC QN AUTO: 29.7 PG (ref 26–34)
MCHC RBC AUTO-ENTMCNC: 32.9 G/DL (ref 31–36)
MCV RBC AUTO: 90.3 FL (ref 80–100)
MONOCYTES # BLD: 0.7 K/UL (ref 0–1.3)
MONOCYTES NFR BLD: 11.3 %
NEUTROPHILS # BLD: 3.5 K/UL (ref 1.7–7.7)
NEUTROPHILS NFR BLD: 59.1 %
PLATELET # BLD AUTO: 259 K/UL (ref 135–450)
PMV BLD AUTO: 7.6 FL (ref 5–10.5)
RBC # BLD AUTO: 2.58 M/UL (ref 4–5.2)
WBC # BLD AUTO: 5.9 K/UL (ref 4–11)

## 2024-05-22 PROCEDURE — 97110 THERAPEUTIC EXERCISES: CPT

## 2024-05-22 PROCEDURE — 6370000000 HC RX 637 (ALT 250 FOR IP): Performed by: STUDENT IN AN ORGANIZED HEALTH CARE EDUCATION/TRAINING PROGRAM

## 2024-05-22 PROCEDURE — 97530 THERAPEUTIC ACTIVITIES: CPT

## 2024-05-22 PROCEDURE — 36415 COLL VENOUS BLD VENIPUNCTURE: CPT

## 2024-05-22 PROCEDURE — 2580000003 HC RX 258: Performed by: ORTHOPAEDIC SURGERY

## 2024-05-22 PROCEDURE — 6370000000 HC RX 637 (ALT 250 FOR IP): Performed by: SPECIALIST/TECHNOLOGIST

## 2024-05-22 PROCEDURE — 97535 SELF CARE MNGMENT TRAINING: CPT

## 2024-05-22 PROCEDURE — 6360000002 HC RX W HCPCS: Performed by: SPECIALIST/TECHNOLOGIST

## 2024-05-22 PROCEDURE — 85025 COMPLETE CBC W/AUTO DIFF WBC: CPT

## 2024-05-22 RX ORDER — LISINOPRIL 20 MG/1
10 TABLET ORAL DAILY
DISCHARGE
Start: 2024-05-22

## 2024-05-22 RX ADMIN — ENOXAPARIN SODIUM 30 MG: 100 INJECTION SUBCUTANEOUS at 08:31

## 2024-05-22 RX ADMIN — METHOCARBAMOL 500 MG: 500 TABLET ORAL at 15:33

## 2024-05-22 RX ADMIN — ACETAMINOPHEN 650 MG: 325 TABLET ORAL at 08:31

## 2024-05-22 RX ADMIN — METHOCARBAMOL 500 MG: 500 TABLET ORAL at 08:31

## 2024-05-22 RX ADMIN — ACETAMINOPHEN 650 MG: 325 TABLET ORAL at 15:33

## 2024-05-22 RX ADMIN — Medication 10 ML: at 08:32

## 2024-05-22 RX ADMIN — LISINOPRIL 20 MG: 20 TABLET ORAL at 08:32

## 2024-05-22 ASSESSMENT — PAIN SCALES - PAIN ASSESSMENT IN ADVANCED DEMENTIA (PAINAD)
FACIALEXPRESSION: SMILING OR INEXPRESSIVE
BREATHING: NORMAL
BODYLANGUAGE: RELAXED
CONSOLABILITY: NO NEED TO CONSOLE
TOTALSCORE: 0

## 2024-05-22 ASSESSMENT — PAIN DESCRIPTION - ORIENTATION: ORIENTATION: RIGHT

## 2024-05-22 ASSESSMENT — PAIN SCALES - GENERAL
PAINLEVEL_OUTOF10: 0
PAINLEVEL_OUTOF10: 4

## 2024-05-22 ASSESSMENT — PAIN DESCRIPTION - DESCRIPTORS: DESCRIPTORS: ACHING;DISCOMFORT

## 2024-05-22 ASSESSMENT — PAIN DESCRIPTION - LOCATION: LOCATION: HIP

## 2024-05-22 NOTE — CONSULTS
status: Never    Smokeless tobacco: Never   Substance and Sexual Activity    Alcohol use: No    Drug use: No    Sexual activity: Yes     Partners: Male   Other Topics Concern    Not on file   Social History Narrative    Not on file     Social Determinants of Health     Financial Resource Strain: Low Risk  (9/30/2021)    Overall Financial Resource Strain (CARDIA)     Difficulty of Paying Living Expenses: Not hard at all   Food Insecurity: No Food Insecurity (5/15/2024)    Hunger Vital Sign     Worried About Running Out of Food in the Last Year: Never true     Ran Out of Food in the Last Year: Never true   Transportation Needs: No Transportation Needs (5/15/2024)    PRAPARE - Transportation     Lack of Transportation (Medical): No     Lack of Transportation (Non-Medical): No   Physical Activity: Unknown (3/10/2022)    Exercise Vital Sign     Days of Exercise per Week: 0 days     Minutes of Exercise per Session: Not on file   Stress: Not on file   Social Connections: Not on file   Intimate Partner Violence: Not on file   Housing Stability: Low Risk  (5/15/2024)    Housing Stability Vital Sign     Unable to Pay for Housing in the Last Year: No     Number of Places Lived in the Last Year: 1     Unstable Housing in the Last Year: No       Meds:   Current Facility-Administered Medications: lisinopril (PRINIVIL;ZESTRIL) tablet 20 mg, 20 mg, Oral, Daily  lactulose (CHRONULAC) 10 GM/15ML solution 20 g, 20 g, Oral, TID PRN  sennosides-docusate sodium (SENOKOT-S) 8.6-50 MG tablet 2 tablet, 2 tablet, Oral, Daily PRN  polyethylene glycol (GLYCOLAX) packet 17 g, 17 g, Oral, Daily PRN  calcium carbonate (TUMS) chewable tablet 500 mg, 500 mg, Oral, TID PRN  enoxaparin Sodium (LOVENOX) injection 30 mg, 30 mg, SubCUTAneous, Daily  0.9 % sodium chloride infusion, , IntraVENous, PRN  acetaminophen (TYLENOL) tablet 650 mg, 650 mg, Oral, Q6H  methocarbamol (ROBAXIN) tablet 500 mg, 500 mg, Oral, TID  sodium chloride flush 0.9 % injection  5-40 mL, 5-40 mL, IntraVENous, 2 times per day  sodium chloride flush 0.9 % injection 5-40 mL, 5-40 mL, IntraVENous, PRN  0.9 % sodium chloride infusion, , IntraVENous, PRN  sertraline (ZOLOFT) tablet 75 mg, 75 mg, Oral, QPM  HYDROmorphone HCl PF (DILAUDID) injection 0.25 mg, 0.25 mg, IntraVENous, Q3H PRN  ALPRAZolam (XANAX) tablet 0.25 mg, 0.25 mg, Oral, TID PRN  donepezil (ARICEPT) tablet 10 mg, 10 mg, Oral, Nightly  melatonin disintegrating tablet 5 mg, 5 mg, Oral, Nightly  0.9 % sodium chloride infusion, , IntraVENous, PRN  ondansetron (ZOFRAN-ODT) disintegrating tablet 4 mg, 4 mg, Oral, Q8H PRN **OR** ondansetron (ZOFRAN) injection 4 mg, 4 mg, IntraVENous, Q6H PRN  [DISCONTINUED] acetaminophen (TYLENOL) tablet 650 mg, 650 mg, Oral, Q6H PRN **OR** acetaminophen (TYLENOL) suppository 650 mg, 650 mg, Rectal, Q6H PRN  oxyCODONE (ROXICODONE) immediate release tablet 5 mg, 5 mg, Oral, Q4H PRN **OR** oxyCODONE (ROXICODONE) immediate release tablet 10 mg, 10 mg, Oral, Q4H PRN    Vitals:  BP (!) 187/76   Pulse 62   Temp 98.9 °F (37.2 °C) (Oral)   Resp 16   Ht 1.524 m (5')   Wt 55.6 kg (122 lb 9.2 oz)   SpO2 97%   BMI 23.94 kg/m²     Intake/Output Summary (Last 24 hours) at 5/22/2024 1031  Last data filed at 5/22/2024 0546  Gross per 24 hour   Intake --   Output 1700 ml   Net -1700 ml       Physical Exam:  General Appearance: Alert and oriented x 1, cooperative, no distress, appears stated age  Head: Normocephalic, without obvious abnormality, atraumatic  Back: no CVA tenderness  Lungs: respirations unlabored, no wheezing  Heart: Regular rate and rhythm, no lower extremity edema noted  Abdomen: Soft, non-tender, non-distended, no masses  Skin: Skin color, texture, turgor normal, no rashes or lesions  Neurologic: no gross deficits  Female :   Enriquez catheter intact with yellow output  Bladder is non tender      Labs:  CBC   Lab Results   Component Value Date/Time    WBC 5.9 05/22/2024 05:28 AM    RBC 2.58

## 2024-05-22 NOTE — PROGRESS NOTES
This patient has had a discharge order placed. They are being discharged to [facility] and being picked up by medical transport. Discharge paperwork has been printed and faxed by [facility]SE[facility] RJ completed by this RN. no further needs at this time. IV has been removed with no complications. Telemetry has been removed. Pt has all belongings present.    Report has been called to Mer[facility] and all questions have been answered.

## 2024-05-22 NOTE — CARE COORDINATION
CASE MANAGEMENT DISCHARGE SUMMARY      Discharge to: [facility]    Precertification completed: yes  Hospital Exemption Notification (HENS) completed: yes    IMM given: (date) 5/22/24         Transportation:        Medical Transport explained to pt/family. Pt/family voice no agency preference.    Agency used: [facility]   time: 4pm   Ambulance form completed: Yes    Confirmed discharge plan with:     Patient: yes      Family:  yes- daughter at bedside     Facility/Agency, name:  [facility]   RJ/AVS faxed   Phone number for report to facility:  [phone]     RN, name: Shruti    Note: Discharging nurse to complete RJ, reconcile AVS, and place final copy with patient's discharge packet. RN to ensure that written prescriptions for  Level II medications are sent with patient to the facility as per protocol.

## 2024-05-22 NOTE — PLAN OF CARE
Pt A&Ox1 lying in bed. Pt and family orientated to room and call light. Bed is in lowest position with wheels locked. 2/4 siderails raised. Non-slip socks are on. Pt and family also educated on q2 turns. Room is well lit. Call light within reach, will continue to monitor.

## 2024-05-22 NOTE — PROGRESS NOTES
Comprehensive Nutrition Assessment    Type and Reason for Visit:  Initial, RD Nutrition Re-Screen/LOS    Nutrition Recommendations/Plan:   Continue regular diet and encourage PO intake   RD to add chocolate ensure BID   RD to add cut up meats on meal tray ticket  RD to order new updated weight  Monitor nutrition adequacy, pertinent labs, bowel habits, wt changes, and clinical progress     Malnutrition Assessment:  Malnutrition Status:  At risk for malnutrition (Comment) (05/22/24 1003)    Context:  Acute Illness     Findings of the 6 clinical characteristics of malnutrition:  Energy Intake:  Mild decrease in energy intake (Comment)  Fluid Accumulation:  Mild Extremities    Nutrition Assessment:    LOS assessment: 89 y.o. f w/ PMH of HTN/CKD and mild dementia admitted w/ acute severe R hip pain after fall, found to have closed displaced fracture of right femoral neck, 2/2 mechanical fall. S/p right hip hemiarthroplasty on 5/15. On regular diet. Po intakes 26-50%, % of meals. PO intakes 26-50%, % of meals, pt reports decreased appetite eating around 50% of meals. Spoke to family member and pt. Reports cut up meat would be helpful on meal trays, RD to add tray note. No wt loss in EMR. Reports stable weight. Pt willing to trial chocolate ensure, RD to add BID. Encouraged protein intake to promote wound healing. Continue to encourage PO intake, will continue to monitor.    Nutrition Related Findings:    BLE trace edema. No updated labs. + BM 5/21. -5.8 L since admission. Wound Type: Surgical Incision, Pressure Injury, Stage II       Current Nutrition Intake & Therapies:    Average Meal Intake: 26-50%, %  Average Supplements Intake: %  ADULT DIET; Regular    Anthropometric Measures:  Height: 152.4 cm (5')  Ideal Body Weight (IBW): 100 lbs (45 kg)       Current Body Weight: 55.3 kg (122 lb), 122 % IBW. Weight Source: Bed Scale  Current BMI (kg/m2): 23.8        Weight Adjustment For: No Adjustment

## 2024-05-22 NOTE — DISCHARGE SUMMARY
1      Handicap Placard MISC by Does not apply route 04/07/22  Qty: 1 each, Refills: 0    Associated Diagnoses: Pain in joint involving pelvic region and thigh, unspecified laterality; Arthritis      diclofenac sodium 1 % GEL Apply 4 g topically 4 times daily  Qty: 2 Tube, Refills: 5      Cholecalciferol (VITAMIN D) 2000 units CAPS capsule Take by mouth      aspirin 81 MG tablet Take 1 tablet by mouth daily           Current Discharge Medication List          Significant Test Results    XR CHEST PORTABLE    Result Date: 5/20/2024  EXAMINATION: ONE XRAY VIEW OF THE CHEST 5/20/2024 11:06 am COMPARISON: 05/14/2024 HISTORY: ORDERING SYSTEM PROVIDED HISTORY: cough TECHNOLOGIST PROVIDED HISTORY: Reason for exam:->cough Reason for Exam: cough FINDINGS: The lungs are without acute focal process.  There is no effusion or pneumothorax. The cardiomediastinal silhouette is stable. The osseous structures are stable.     No acute process. Stable exam.     XR HIP 2-3 VW W PELVIS RIGHT    Result Date: 5/15/2024  EXAMINATION: ONE XRAY VIEW OF THE PELVIS AND TWO XRAY VIEWS RIGHT HIP 5/15/2024 7:09 pm COMPARISON: Yesterday HISTORY: ORDERING SYSTEM PROVIDED HISTORY: Right hip hemiarthroplasty TECHNOLOGIST PROVIDED HISTORY: Of operative side while in recovery room Reason for exam:->Right hip hemiarthroplasty Reason for Exam: post op right hip FINDINGS: Total hip arthroplasty is in place.  The acetabular component appears to have a horizontal lie but this may be related to the positioning of the patient. Gas and edema in the subcutaneous tissues.  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  No acute fracture or dislocation.  Additionally, the femoral stem is mildly medially placed.     Total hip arthroplasty     CT CERVICAL SPINE WO CONTRAST    Result Date: 5/14/2024  EXAMINATION: CT OF THE CERVICAL SPINE WITHOUT CONTRAST 5/14/2024 5:08 pm TECHNIQUE: CT of the cervical spine was performed without the administration of intravenous contrast.  in the last 72 hours.  Recent Labs     05/20/24  0610 05/21/24  0548   AST 22 20   ALT 9* 10   BILITOT 0.4 0.3   ALKPHOS 88 88     No results for input(s): \"INR\", \"LACTA\", \"TSH\" in the last 72 hours.    Urine Cultures:   Lab Results   Component Value Date/Time    LABURIN No growth at 18 to 36 hours 10/01/2021 04:38 PM     Blood Cultures:   Lab Results   Component Value Date/Time    BC No Growth after 4 days of incubation. 01/28/2024 06:01 PM     Lab Results   Component Value Date/Time    BLOODCULT2 No Growth after 4 days of incubation. 01/29/2024 12:59 AM     Organism:   Lab Results   Component Value Date/Time    ORG Enterococcus faecalis 07/17/2018 10:36 AM       Signed:    Chris Vines DO

## 2024-05-22 NOTE — CONSULTS
Consult Call Back    Who:Tricia Ortiz   Date:5/22/2024,  Time:11:36 AM    Electronically signed by Gregoria Francis on 5/22/24 at 11:36 AM EDT

## 2024-05-22 NOTE — PROGRESS NOTES
Physical Therapy  Facility/Department: [facility] C5 - MED SURG/ORTHO  Daily Treatment Note  NAME: Susan Singh  : 1935  MRN: [id number]    Date of Service: 2024    Discharge Recommendations:  Subacute/Skilled Nursing Facility   PT Equipment Recommendations  Equipment Needed: No  Other: defer to next level of care.    Patient Diagnosis(es): The primary encounter diagnosis was Closed displaced fracture of right femoral neck (HCC). A diagnosis of Fall, initial encounter was also pertinent to this visit.    Assessment   Assessment: Pt  required max A x 2 for supine to sit and STS in stedy, she utilized the stedy bed to chair WBAT and A of 2.  Pt was uanble to perform a static stand like yesterday due to increased pain and spasms. Pt participated in BLE exs supine and seated in chair with max A R and L X 10 reps.  Pt required multiple rests/breaks in activity throughout session for pain and spasms as well as reorientation to situation and place multiple times. Pt was able to be left up in chair at EOS with dtr at her side. Co-tx collaboration this date with OT staff to safely progress pt toward goals. Pt functioning below her baseline, continue to recommend SNF at d/c.  Activity Tolerance: Patient limited by fatigue;Treatment limited secondary to decreased cognition;Patient limited by endurance  Equipment Needed: No  Other: defer to next level of care.     Plan    Physical Therapy Plan  General Plan: 1 time a day 7 days a week  Current Treatment Recommendations: Strengthening;Balance training;Functional mobility training;ROM;Transfer training;Endurance training;Pain management;Gait training;Home exercise program;Safety education & training;Patient/Caregiver education & training;Therapeutic activities     Restrictions  Restrictions/Precautions  Restrictions/Precautions: Fall Risk, Weight Bearing  Required Braces or Orthoses?: No  Lower Extremity Weight Bearing Restrictions  Right Lower Extremity Weight

## 2024-05-27 NOTE — PROGRESS NOTES
Physician Progress Note      PATIENT:               CHANDNI GARCIA  CSN #:                  [id number]  :                       1935  ADMIT DATE:       2024 4:18 PM  DISCH DATE:        2024 5:16 PM  RESPONDING  PROVIDER #:        Chris Vines DO          QUERY TEXT:    Pt admitted with R Hip Fracture and has anemia documented in IM PN . If   possible, please document in progress notes and discharge summary further   specificity regarding the acuity and type of anemia:    The medical record reflects the following:  Risk Factors: s/p Open treatment of right femoral neck fracture-fit   hemiarthroplasty.  Clinical Indicators: IM PN 41-59-[id number] versus acute blood loss   anemia: Hemoglobin stable at 8.4 status post 1x PRBC on 2024.  HGB-7.1, 7.7, 8.6, 9.7  HCT-30.2, 21.7, 23.6  Treatment: status post 1x PRBC, serial labs.        Thank you,  [facility]ana [facility]  Options provided:  -- Anemia due to acute blood loss  -- Anemia due to postoperative blood loss  -- Other - I will add my own diagnosis  -- Disagree - Not applicable / Not valid  -- Disagree - Clinically unable to determine / Unknown  -- Refer to Clinical Documentation Reviewer    PROVIDER RESPONSE TEXT:    This patient has acute blood loss anemia.    Query created by: Alana Zapien on 2024 2:49 AM      Electronically signed by:  Chris Vines DO 2024 10:06 AM

## 2024-06-17 SDOH — HEALTH STABILITY: PHYSICAL HEALTH: ON AVERAGE, HOW MANY DAYS PER WEEK DO YOU ENGAGE IN MODERATE TO STRENUOUS EXERCISE (LIKE A BRISK WALK)?: 0 DAYS

## 2024-06-18 ENCOUNTER — OFFICE VISIT (OUTPATIENT)
Dept: ORTHOPEDIC SURGERY | Age: 89
End: 2024-06-18

## 2024-06-18 DIAGNOSIS — S42.201A CLOSED FRACTURE OF PROXIMAL END OF RIGHT HUMERUS, UNSPECIFIED FRACTURE MORPHOLOGY, INITIAL ENCOUNTER: Primary | ICD-10-CM

## 2024-06-18 DIAGNOSIS — S72.001A CLOSED FRACTURE OF NECK OF RIGHT FEMUR, INITIAL ENCOUNTER (HCC): ICD-10-CM

## 2024-06-18 PROCEDURE — 99024 POSTOP FOLLOW-UP VISIT: CPT | Performed by: ORTHOPAEDIC SURGERY

## 2024-06-18 NOTE — PROGRESS NOTES
DIAGNOSIS:  Right femoral neck fracture, status post Press-Fit bipolar hemiarthroplasty.    DATE OF SURGERY:  5/15/2024  .    HISTORY OF PRESENT ILLNESS:  Ms. Singh 89 y.o.  female who came in today for 4 weeks postoperative visit.  The patient denies any significant pain in the right hip. She  has been walking weightbearing as tolerated using a walker.  No numbness or tingling sensation. No fever or Chills.    PHYSICAL EXAMINATION:  The incision is completely healed right hip. No signs of any erythema or drainage.  She has no pain with the active or passive range of motion of the right hip.  She has intact sensation, distally, and she is neurovascularly intact.    IMAGING:  X-rays were taken in the office today, AP pelvis and 2 views of the right hip and femur, and showed the Press-Fit hemiarthroplasty in good position.  No signs of any lucency.    IMPRESSION:  4 weeks out from right hip hemiarthroplasty and doing very well.    PLAN:  I have told the patient to continue PT, weightbearing as tolerated, as well as strengthening exercises.  The patient will come back for a follow up in 2 months.  At that time, we will take AP pelvis and 2 views of the affected hip.    As this patient has demonstrated risk factors for osteoporosis, such as age greater than fifty years and evidence of a fracture, I have referred the patient back to the primary care physician for evaluation for osteoporosis, including consideration for DEXA scanning, if this is felt to be clinically indicated.  The patient is advised to contact the primary care physician to follow-up for further evaluation.       Heidi Mosley MD

## 2024-06-25 ENCOUNTER — CARE COORDINATION (OUTPATIENT)
Dept: CASE MANAGEMENT | Age: 89
End: 2024-06-25

## 2024-06-25 NOTE — CARE COORDINATION
Care Transitions Note    Initial Call - Call within 2 business days of discharge: Yes    Attempted to reach patient for transitions of care follow up. Unable to reach patient.    Outreach Attempts:   HIPAA compliant voicemail left for family, Arely.     Patient: Susan Singh    Patient : 1935   MRN: [id number]    Reason for Admission: R hip fx  Discharge Date: 24  RURS: Readmission Risk Score: 16.7    Last Discharge Facility       Date Complaint Diagnosis Description Type Department Provider    24 Fall Closed displaced fracture of right femoral neck (HCC) ... ED to Hosp-Admission (Discharged) (ADMITTED) [facility] Chris Vines, DO; Robert Jacobs...            Was this an external facility discharge? Yes. Discharge Date: . Facility Name: [facility]    Follow Up Appointment:   Patient does not have a follow up appointment scheduled at time of call.  Has ortho appt and pt is healing well.       Plan for follow-up call in 2-5 days     EMILY BensonN, RN   [facility] Transition Nurse  [phone]

## 2024-06-27 ENCOUNTER — CARE COORDINATION (OUTPATIENT)
Dept: CASE MANAGEMENT | Age: 89
End: 2024-06-27

## 2024-06-27 NOTE — CARE COORDINATION
Care Transitions Note    Initial Call - Call within 2 business days of discharge: Yes    Patient Current Location:  Home: [address] [place] [id number]    Care Transition Nurse contacted the family, mary  by telephone to perform post hospital discharge assessment, verified name and  as identifiers. Provided introduction to self, and explanation of the Care Transition Nurse role.     Patient: Susan Singh    Patient : 1935   MRN: [id number]    Reason for Admission: R hip fx  Discharge Date: 24  RURS: Readmission Risk Score: 16.7      Last Discharge Facility       Date Complaint Diagnosis Description Type Department Provider    24 Fall Closed displaced fracture of right femoral neck (HCC) ... ED to Hosp-Admission (Discharged) (ADMITTED) Chris Khan, ; Robert Jacobs...            Was this an external facility discharge? Yes. Discharge Date: . Facility Name: [facility]    Additional needs identified to be addressed with provider   No needs identified             Method of communication with provider: none.    Patients top risk factors for readmission: functional physical ability    Interventions to address risk factors:   Discharge assessment    Care Summary Note: Spoke with Mary after 2 IDs. Pt is doing ok and alive. Not happy with SNF. [facility] has made contact. Mary is caregiver and has a bad back and mom cannot sit up by herself. Very disappointed in her condition. States that Dr Brandon is no longer her PCP. It is Bernarda Medrano from [facility] [place]. Mom has f/u with PCP and with urology as she still has a lopez. Mom also still on Lovenox. She has all her meds. Closing as [facility] has established. [facility]CP [facility], and PCP HFU made. And past 30 days.     Care Transition Nurse reviewed red flags with family. The family was given an opportunity to ask questions; no further questions or concerns at this time. Just unhappy with SNF care.PCP to address medication

## 2024-07-24 ENCOUNTER — HOSPITAL ENCOUNTER (EMERGENCY)
Age: 89
Discharge: HOME | End: 2024-07-24
Payer: MEDICARE

## 2024-07-24 VITALS
DIASTOLIC BLOOD PRESSURE: 56 MMHG | SYSTOLIC BLOOD PRESSURE: 104 MMHG | RESPIRATION RATE: 17 BRPM | OXYGEN SATURATION: 98 % | HEIGHT: 60 IN | TEMPERATURE: 99.1 F | WEIGHT: 122 LBS | BODY MASS INDEX: 23.95 KG/M2 | HEART RATE: 65 BPM

## 2024-07-24 DIAGNOSIS — E87.1 HYPONATREMIA: Primary | ICD-10-CM

## 2024-07-24 LAB
ALBUMIN SERPL-MCNC: 3.9 G/DL (ref 3.4–5)
ALBUMIN/GLOB SERPL: 1 {RATIO} (ref 1.1–2.2)
ALP SERPL-CCNC: 66 U/L (ref 40–129)
ALT SERPL-CCNC: 10 U/L (ref 10–40)
ANION GAP SERPL CALCULATED.3IONS-SCNC: 11 MMOL/L (ref 3–16)
AST SERPL-CCNC: 14 U/L (ref 15–37)
BASOPHILS # BLD: 0 K/UL (ref 0–0.2)
BASOPHILS NFR BLD: 0.4 %
BILIRUB SERPL-MCNC: <0.2 MG/DL (ref 0–1)
BUN SERPL-MCNC: 41 MG/DL (ref 7–20)
CALCIUM SERPL-MCNC: 9.6 MG/DL (ref 8.3–10.6)
CHLORIDE SERPL-SCNC: 96 MMOL/L (ref 99–110)
CO2 SERPL-SCNC: 22 MMOL/L (ref 21–32)
CREAT SERPL-MCNC: 1.3 MG/DL (ref 0.6–1.2)
DEPRECATED RDW RBC AUTO: 15.2 % (ref 12.4–15.4)
EOSINOPHIL # BLD: 0.2 K/UL (ref 0–0.6)
EOSINOPHIL NFR BLD: 2.9 %
GFR SERPLBLD CREATININE-BSD FMLA CKD-EPI: 40 ML/MIN/{1.73_M2}
GLUCOSE SERPL-MCNC: 113 MG/DL (ref 70–99)
HCT VFR BLD AUTO: 29.1 % (ref 36–48)
HGB BLD-MCNC: 9.7 G/DL (ref 12–16)
LYMPHOCYTES # BLD: 1.2 K/UL (ref 1–5.1)
LYMPHOCYTES NFR BLD: 20.5 %
MCH RBC QN AUTO: 30 PG (ref 26–34)
MCHC RBC AUTO-ENTMCNC: 33.3 G/DL (ref 31–36)
MCV RBC AUTO: 90.3 FL (ref 80–100)
MONOCYTES # BLD: 0.4 K/UL (ref 0–1.3)
MONOCYTES NFR BLD: 6.5 %
NEUTROPHILS # BLD: 3.9 K/UL (ref 1.7–7.7)
NEUTROPHILS NFR BLD: 69.7 %
PLATELET # BLD AUTO: 312 K/UL (ref 135–450)
PMV BLD AUTO: 7.3 FL (ref 5–10.5)
POTASSIUM SERPL-SCNC: 5 MMOL/L (ref 3.5–5.1)
PROT SERPL-MCNC: 7.7 G/DL (ref 6.4–8.2)
RBC # BLD AUTO: 3.22 M/UL (ref 4–5.2)
SODIUM SERPL-SCNC: 129 MMOL/L (ref 136–145)
WBC # BLD AUTO: 5.6 K/UL (ref 4–11)

## 2024-07-24 PROCEDURE — 80053 COMPREHEN METABOLIC PANEL: CPT

## 2024-07-24 PROCEDURE — 85025 COMPLETE CBC W/AUTO DIFF WBC: CPT

## 2024-07-24 PROCEDURE — 99284 EMERGENCY DEPT VISIT MOD MDM: CPT

## 2024-07-24 PROCEDURE — 36415 COLL VENOUS BLD VENIPUNCTURE: CPT

## 2024-07-24 PROCEDURE — 2580000003 HC RX 258: Performed by: NURSE PRACTITIONER

## 2024-07-24 RX ORDER — 0.9 % SODIUM CHLORIDE 0.9 %
1000 INTRAVENOUS SOLUTION INTRAVENOUS ONCE
Status: COMPLETED | OUTPATIENT
Start: 2024-07-24 | End: 2024-07-24

## 2024-07-24 RX ADMIN — SODIUM CHLORIDE 1000 ML: 9 INJECTION, SOLUTION INTRAVENOUS at 13:16

## 2024-07-24 ASSESSMENT — PAIN - FUNCTIONAL ASSESSMENT: PAIN_FUNCTIONAL_ASSESSMENT: NONE - DENIES PAIN

## 2024-07-24 ASSESSMENT — LIFESTYLE VARIABLES
HOW MANY STANDARD DRINKS CONTAINING ALCOHOL DO YOU HAVE ON A TYPICAL DAY: PATIENT DOES NOT DRINK
HOW OFTEN DO YOU HAVE A DRINK CONTAINING ALCOHOL: NEVER

## 2024-07-24 NOTE — ED NOTES
@0457 Called Family Practice per Donny TADEO  RE:hyponatremia  @1439 PCP called back and spoke to Donny TADEO

## 2024-07-29 NOTE — ED PROVIDER NOTES
deficits.    DIAGNOSTIC RESULTS   LABS:    Labs Reviewed   CBC WITH AUTO DIFFERENTIAL - Abnormal; Notable for the following components:       Result Value    RBC 3.22 (*)     Hemoglobin 9.7 (*)     Hematocrit 29.1 (*)     All other components within normal limits   COMPREHENSIVE METABOLIC PANEL - Abnormal; Notable for the following components:    Sodium 129 (*)     Chloride 96 (*)     Glucose 113 (*)     BUN 41 (*)     Creatinine 1.3 (*)     Est, Glom Filt Rate 40 (*)     Albumin/Globulin Ratio 1.0 (*)     AST 14 (*)     All other components within normal limits       When ordered only abnormal lab results are displayed. All other labs were within normal range or not returned as of this dictation.    EKG: When ordered, EKG's are interpreted by the Emergency Department Physician in the absence of a cardiologist.  Please see their note for interpretation of EKG.    RADIOLOGY:   Non-plain film images such as CT, Ultrasound and MRI are read by the radiologist. Plain radiographic images are visualized and preliminarily interpreted by the ED Provider with the below findings:        Interpretation per the Radiologist below, if available at the time of this note:    No orders to display     No results found.    No results found.    PROCEDURES   Unless otherwise noted below, none     Procedures    CRITICAL CARE TIME (.cctime)       PAST MEDICAL HISTORY      has a past medical history of Arthritis, Chicken pox, Chills, CKD (chronic kidney disease) stage 3, GFR 30-59 ml/min ([facility]) (1/10/2019), Current moderate episode of major depressive disorder without prior episode ([facility]) (4/28/2022), Fever, Hip pain, Hyperlipidemia, Hypertension, Measles, Miscarriage, Osteoarthritis, Pneumonia, Poor circulation, Ringing in ears, Sacroiliitis ([facility]) (4/15/2016), Sensorineural hearing loss, bilateral (10/20/2020), Swelling of both ankles, and Tonsillitis.     EMERGENCY DEPARTMENT COURSE and DIFFERENTIAL DIAGNOSIS/MDM:   Vitals:    Vitals:

## 2024-08-17 NOTE — TELEPHONE ENCOUNTER
SW Contact 2nd attempt to contact daughter/caregiver per referral to provide caregiver and home health resources. Resources based on notes in referral are as follows:   1. [employer] Supports families who are caring for people with the disease. Family can request a free \"care consultation\" to assist with care planning and education in preparation for the progression of the disease and anticipated increased care needs. 24/7 Helpline : [phone]. Home Care and Supervision:     2. [address] [place] [address]  [phone]  [phone]    3.  [facility] on [address] [facility].  [phone]

## (undated) DEVICE — MASTISOL ADHESIVE LIQ 2/3ML

## (undated) DEVICE — STRIP,CLOSURE,WOUND,MEDI-STRIP,1/2X4: Brand: MEDLINE

## (undated) DEVICE — SUTURE VICRYL + SZ 3-0 L18IN ABSRB UD SH 1/2 CIR TAPERCUT NDL VCP864D

## (undated) DEVICE — SUTURE VICRYL SZ 2-0 L18IN ABSRB UD CT-1 L36MM 1/2 CIR J839D

## (undated) DEVICE — SUTURE MONOCRYL SZ 3-0 L27IN ABSRB UD PS-2 3/8 CIR REV CUT NDL MCP427H

## (undated) DEVICE — 3M™ STERI-STRIP™ COMPOUND BENZOIN TINCTURE 40 BAGS/CARTON 4 CARTONS/CASE C1544: Brand: 3M™ STERI-STRIP™

## (undated) DEVICE — ELECTRODE,ECG,STRESS,FOAM,3PK: Brand: MEDLINE

## (undated) DEVICE — GLOVE ORTHO 8   MSG9480

## (undated) DEVICE — HANDPIECE SET WITH HIGH FLOW TIP AND SUCTION TUBE: Brand: INTERPULSE

## (undated) DEVICE — Device

## (undated) DEVICE — SUTURE ABSORBABLE MONOFILAMENT 1 CTX 36 CM 48 MM VIO PDS +

## (undated) DEVICE — SUTURE TICRN BR BL NDL C-20 SZ 5 30 8886302779

## (undated) DEVICE — GOWN,REINFORCED,POLY,XLARGE: Brand: MEDLINE

## (undated) DEVICE — CANNULA,OXY,ADULT,SUPERSOFT,W/7'TUB,SC: Brand: MEDLINE INDUSTRIES, INC.

## (undated) DEVICE — OPTIFOAM GENTLE SA, POSTOP, 4X10: Brand: MEDLINE

## (undated) DEVICE — 3M™ STERI-DRAPE™ INSTRUMENT POUCH 1018: Brand: STERI-DRAPE™

## (undated) DEVICE — SUTURE VICRYL + SZ 0 L18IN ABSRB UD L36MM CT-1 1/2 CIR VCP840D

## (undated) DEVICE — 3M™ STERI-STRIP™ REINFORCED ADHESIVE SKIN CLOSURES, R1547, 1/2 IN X 4 IN (12 MM X 100 MM), 6 STRIPS/ENVELOPE: Brand: 3M™ STERI-STRIP™

## (undated) DEVICE — SYRINGE MED 30ML STD CLR PLAS LUERLOCK TIP N CTRL DISP

## (undated) DEVICE — FORCEP BX STD CAP 240CM RAD JAW 4

## (undated) DEVICE — SUTURE ETHIBOND EXCEL SZ 2 L30IN NONABSORBABLE GRN L40MM V-37 MX69G

## (undated) DEVICE — ENDO CARRY-ON PROCEDURE KIT INCLUDES SUCTION TUBING, LUBRICANT, GAUZE, BIOHAZARD STICKER, TRANSPORT PAD AND INTERCEPT BEDSIDE KIT.: Brand: ENDO CARRY-ON PROCEDURE KIT

## (undated) DEVICE — SUTURE ETHIBOND EXCEL SZ 5 L30IN NONABSORBABLE GRN L40MM V-37 MB66G